# Patient Record
Sex: FEMALE | Race: BLACK OR AFRICAN AMERICAN | Employment: UNEMPLOYED | URBAN - METROPOLITAN AREA
[De-identification: names, ages, dates, MRNs, and addresses within clinical notes are randomized per-mention and may not be internally consistent; named-entity substitution may affect disease eponyms.]

---

## 2017-03-09 ENCOUNTER — OFFICE VISIT (OUTPATIENT)
Dept: CARDIOLOGY CLINIC | Age: 52
End: 2017-03-09

## 2017-03-09 VITALS
HEART RATE: 86 BPM | HEIGHT: 60 IN | OXYGEN SATURATION: 98 % | SYSTOLIC BLOOD PRESSURE: 139 MMHG | WEIGHT: 241 LBS | BODY MASS INDEX: 47.32 KG/M2 | DIASTOLIC BLOOD PRESSURE: 87 MMHG

## 2017-03-09 DIAGNOSIS — I10 ESSENTIAL HYPERTENSION: ICD-10-CM

## 2017-03-09 DIAGNOSIS — G47.33 OSA ON CPAP: ICD-10-CM

## 2017-03-09 DIAGNOSIS — R06.02 SOB (SHORTNESS OF BREATH): Primary | ICD-10-CM

## 2017-03-09 DIAGNOSIS — F17.200 SMOKER: ICD-10-CM

## 2017-03-09 DIAGNOSIS — Z99.89 OSA ON CPAP: ICD-10-CM

## 2017-03-09 NOTE — PATIENT INSTRUCTIONS
-- Make an appointment with Dr. Payal Mijares to discuss smoking cessation  -- Watch the salt in your diet  -- You are doing well; we aren't going to change your medications at this time  -- Encourage you to watch your diet - eat fresh foods over pre-made foods - this will help with blood pressure, water gain and weight. -- Loosing the weight will help with your shortness of breath as well. -- Call anytime you have a question, otherwise we will see you in 1 year. Stopping Smoking: Care Instructions  Your Care Instructions  Cigarette smokers crave the nicotine in cigarettes. Giving it up is much harder than simply changing a habit. Your body has to stop craving the nicotine. It is hard to quit, but you can do it. There are many tools that people use to quit smoking. You may find that combining tools works best for you. There are several steps to quitting. First you get ready to quit. Then you get support to help you. After that, you learn new skills and behaviors to become a nonsmoker. For many people, a necessary step is getting and using medicine. Your doctor will help you set up the plan that best meets your needs. You may want to attend a smoking cessation program to help you quit smoking. When you choose a program, look for one that has proven success. Ask your doctor for ideas. You will greatly increase your chances of success if you take medicine as well as get counseling or join a cessation program.  Some of the changes you feel when you first quit tobacco are uncomfortable. Your body will miss the nicotine at first, and you may feel short-tempered and grumpy. You may have trouble sleeping or concentrating. Medicine can help you deal with these symptoms. You may struggle with changing your smoking habits and rituals. The last step is the tricky one: Be prepared for the smoking urge to continue for a time. This is a lot to deal with, but keep at it. You will feel better.   Follow-up care is a key part of your treatment and safety. Be sure to make and go to all appointments, and call your doctor if you are having problems. Its also a good idea to know your test results and keep a list of the medicines you take. How can you care for yourself at home? · Ask your family, friends, and coworkers for support. You have a better chance of quitting if you have help and support. · Join a support group, such as Nicotine Anonymous, for people who are trying to quit smoking. · Consider signing up for a smoking cessation program, such as the American Lung Association's Freedom from Smoking program.  · Set a quit date. Pick your date carefully so that it is not right in the middle of a big deadline or stressful time. Once you quit, do not even take a puff. Get rid of all ashtrays and lighters after your last cigarette. Clean your house and your clothes so that they do not smell of smoke. · Learn how to be a nonsmoker. Think about ways you can avoid those things that make you reach for a cigarette. ¨ Avoid situations that put you at greatest risk for smoking. For some people, it is hard to have a drink with friends without smoking. For others, they might skip a coffee break with coworkers who smoke. ¨ Change your daily routine. Take a different route to work or eat a meal in a different place. · Cut down on stress. Calm yourself or release tension by doing an activity you enjoy, such as reading a book, taking a hot bath, or gardening. · Talk to your doctor or pharmacist about nicotine replacement therapy, which replaces the nicotine in your body. You still get nicotine but you do not use tobacco. Nicotine replacement products help you slowly reduce the amount of nicotine you need.  These products come in several forms, many of them available over-the-counter:  ¨ Nicotine patches  ¨ Nicotine gum and lozenges  ¨ Nicotine inhaler  · Ask your doctor about bupropion (Wellbutrin) or varenicline (Chantix), which are prescription medicines. They do not contain nicotine. They help you by reducing withdrawal symptoms, such as stress and anxiety. · Some people find hypnosis, acupuncture, and massage helpful for ending the smoking habit. · Eat a healthy diet and get regular exercise. Having healthy habits will help your body move past its craving for nicotine. · Be prepared to keep trying. Most people are not successful the first few times they try to quit. Do not get mad at yourself if you smoke again. Make a list of things you learned and think about when you want to try again, such as next week, next month, or next year. Where can you learn more? Go to http://tin-viv.info/. Enter S382 in the search box to learn more about \"Stopping Smoking: Care Instructions. \"  Current as of: May 26, 2016  Content Version: 11.1  © 4568-9510 [employer]. Care instructions adapted under license by [employer] (which disclaims liability or warranty for this information). If you have questions about a medical condition or this instruction, always ask your healthcare professional. [address] any warranty or liability for your use of this information. DASH Diet: Care Instructions  Your Care Instructions  The DASH diet is an eating plan that can help lower your blood pressure. DASH stands for Dietary Approaches to Stop Hypertension. Hypertension is high blood pressure. The DASH diet focuses on eating foods that are high in calcium, potassium, and magnesium. These nutrients can lower blood pressure. The foods that are highest in these nutrients are fruits, vegetables, low-fat dairy products, nuts, seeds, and legumes. But taking calcium, potassium, and magnesium supplements instead of eating foods that are high in those nutrients does not have the same effect. The DASH diet also includes whole grains, fish, and poultry.   The DASH diet is one of several lifestyle changes your doctor may recommend to lower your high blood pressure. Your doctor may also want you to decrease the amount of sodium in your diet. Lowering sodium while following the DASH diet can lower blood pressure even further than just the DASH diet alone. Follow-up care is a key part of your treatment and safety. Be sure to make and go to all appointments, and call your doctor if you are having problems. It's also a good idea to know your test results and keep a list of the medicines you take. How can you care for yourself at home? Following the DASH diet  · Eat 4 to 5 servings of fruit each day. A serving is 1 medium-sized piece of fruit, ½ cup chopped or canned fruit, 1/4 cup dried fruit, or 4 ounces (½ cup) of fruit juice. Choose fruit more often than fruit juice. · Eat 4 to 5 servings of vegetables each day. A serving is 1 cup of lettuce or raw leafy vegetables, ½ cup of chopped or cooked vegetables, or 4 ounces (½ cup) of vegetable juice. Choose vegetables more often than vegetable juice. · Get 2 to 3 servings of low-fat and fat-free dairy each day. A serving is 8 ounces of milk, 1 cup of yogurt, or 1 ½ ounces of cheese. · Eat 6 to 8 servings of grains each day. A serving is 1 slice of bread, 1 ounce of dry cereal, or ½ cup of cooked rice, pasta, or cooked cereal. Try to choose whole-grain products as much as possible. · Limit lean meat, poultry, and fish to 2 servings each day. A serving is 3 ounces, about the size of a deck of cards. · Eat 4 to 5 servings of nuts, seeds, and legumes (cooked dried beans, lentils, and split peas) each week. A serving is 1/3 cup of nuts, 2 tablespoons of seeds, or ½ cup of cooked beans or peas. · Limit fats and oils to 2 to 3 servings each day. A serving is 1 teaspoon of vegetable oil or 2 tablespoons of salad dressing. · Limit sweets and added sugars to 5 servings or less a week. A serving is 1 tablespoon jelly or jam, ½ cup sorbet, or 1 cup of lemonade.   · Eat less than 2,300 milligrams (mg) of sodium a day. If you limit your sodium to 1,500 mg a day, you can lower your blood pressure even more. Tips for success  · Start small. Do not try to make dramatic changes to your diet all at once. You might feel that you are missing out on your favorite foods and then be more likely to not follow the plan. Make small changes, and stick with them. Once those changes become habit, add a few more changes. · Try some of the following:  ¨ Make it a goal to eat a fruit or vegetable at every meal and at snacks. This will make it easy to get the recommended amount of fruits and vegetables each day. ¨ Try yogurt topped with fruit and nuts for a snack or healthy dessert. ¨ Add lettuce, tomato, cucumber, and onion to sandwiches. ¨ Combine a ready-made pizza crust with low-fat mozzarella cheese and lots of vegetable toppings. Try using tomatoes, squash, spinach, broccoli, carrots, cauliflower, and onions. ¨ Have a variety of cut-up vegetables with a low-fat dip as an appetizer instead of chips and dip. ¨ Sprinkle sunflower seeds or chopped almonds over salads. Or try adding chopped walnuts or almonds to cooked vegetables. ¨ Try some vegetarian meals using beans and peas. Add garbanzo or kidney beans to salads. Make burritos and tacos with mashed kuo beans or black beans. Where can you learn more? Go to http://tin-viv.info/. Enter M478 in the search box to learn more about \"DASH Diet: Care Instructions. \"  Current as of: March 23, 2016  Content Version: 11.1  © 6888-2790 [email or web address]. Care instructions adapted under license by [employer] (which disclaims liability or warranty for this information). If you have questions about a medical condition or this instruction, always ask your healthcare professional. [address] any warranty or liability for your use of this information.

## 2017-03-09 NOTE — PROGRESS NOTES
[facility]   [address] [facility], [address] [place] [address]                                          NEW PATIENT HPI/FOLLOW-UP      NAME:  Del Damico   :   1965   MRN:   [id number]   PCP:  Christina Murray MD           Subjective: The patient is a 46y.o. year old female who returns for a routine annual follow-up. Since the last visit, patient reports worsening BARNES without chest pain or lightheadedness or syncope. She has  had a weight increase. She continues to smoke 1/2 ppd. Endorsed change in exercise tolerance: cannot walk 1/2 block or 1/2 flight of stairs without severe dyspnea. She is compliant on CPAP. Denies chest pain, edema, medication intolerance, palpitations, PND/orthopnea, wheezing, sputum, syncope, dizziness or light headedness. Doing satisfactorily. Review of Systems  General: +weight gain. Pt is able to conduct ADL's. Respiratory: +shortness of breath, BARNES, Denies wheezing or stridor.   Cardiovascular: Denies precordial pain, palpitations, edema or PND  Gastrointestinal: Denies poor appetite, indigestion, abdominal pain or blood in stool  Peripheral vascular: Denies claudication, leg cramps  Neuropsychiatric: Denies paresthesias,tingling,numbness,anxiety,depression,fatigue  Musculoskeletal: Denies pain,tenderness, soreness,swelling      Past Medical History:   Diagnosis Date    Chronic pelvic pain in female 2014    Depression     Diabetes ([facility] [address]     Hypertension     Obesity (BMI 35.0-39.9 without comorbidity) ([facility] [address]     SHAGUFTA on CPAP 3/9/2017    Psychiatric disorder     depression    Recurrent major depression ([address] 2010    Suicidal thoughts     Thyroid nodule 10/1/2015    Unspecified sleep apnea     uses cpap     Patient Active Problem List    Diagnosis Date Noted    SHAGUFTA on CPAP 2017    Essential hypertension 2017    Smoker 2017    Thyroid nodule 10/01/2015    Granulation tissue of vaginal cuff 2014    H/O:  2014    H/O tubal ligation 2014    Posttraumatic stress disorder 08/10/2012    Obesity (BMI 35.0-39.9 without comorbidity) [facility] [address]     Recurrent major depression ([facility] [address] 2010      Past Surgical History:   Procedure Laterality Date    ABDOMEN SURGERY PROC UNLISTED      cholecystectomy    HX  SECTION      HX  SECTION      HX HEENT      head and neck surgery     HX HYSTERECTOMY      HX ORTHOPAEDIC      L ankle surgery     HX TUBAL LIGATION      PPBTL     Allergies   Allergen Reactions    Zantac [Ranitidine Hcl] Hives      Family History   Problem Relation Age of Onset   [facility] Cancer Mother 61     Breast cancer    Heart Disease Father 50     M. I.   [facility] Asthma Sister     Heart Disease Brother     Hypertension Brother     Hypertension Brother     Diabetes Brother     Lung Disease Brother      COPD      Social History     Social History    Marital status: SINGLE     Spouse name: N/A    Number of children: N/A    Years of education: N/A     Occupational History    Not on file. Social History Main Topics    Smoking status: Current Every Day Smoker     Packs/day: 1.00     Years: 20.00    Smokeless tobacco: Never Used    Alcohol use No    Drug use: No    Sexual activity: Yes     Partners: Male     Birth control/ protection: Surgical     Other Topics Concern    Not on file     Social History Narrative      Current Outpatient Prescriptions   Medication Sig    LIRAGLUTIDE (VICTOZA 2-MARILIN SC) by SubCUTAneous route. 1.8 MG QD    metoprolol succinate (TOPROL-XL) 25 mg XL tablet Take 1 Tab by mouth daily.     lisinopril (PRINIVIL, ZESTRIL) 20 mg tablet     HYDROcodone-acetaminophen (NORCO) 7.5-325 mg per tablet     atorvastatin (LIPITOR) 20 mg tablet  20 mg = 1 tab each dose, PO, bedtime, 0 Refills    metFORMIN ER (GLUCOPHAGE XR) 500 mg tablet     CONTOUR NEXT STRIPS strip     alcohol swabs padm     omeprazole (PRILOSEC) 40 mg capsule Take 1 capsule by mouth two (2) times a day.  albuterol (PROVENTIL HFA, VENTOLIN HFA) 90 mcg/actuation inhaler Take 2 Puffs by inhalation as needed.  ANSHUL PEN NEEDLE 32 x 5/32 \" ndle daily.  insulin glargine (LANTUS) 100 unit/mL injection 30 Units by SubCUTAneous route nightly. Indications: HYPERGLYCEMIA, TYPE 2 DIABETES MELLITUS    ARIPiprazole (ABILIFY) 10 mg tablet Take 10 mg by mouth daily.  citalopram (CELEXA) 40 mg tablet Take 40 mg by mouth daily. No current facility-administered medications for this visit. I have reviewed the MAs notes, vitals, problem list, allergy list, medical history, family medical, social history and medications. Objective:     Physical Exam:     Vitals:    03/09/17 1103   BP: 139/87   Pulse: 86   SpO2: 98%   Weight: 241 lb (109.3 kg)   Height: 5' (1.524 m)    Body mass index is 47.07 kg/(m^2). General: WDWN, in no acute distress. Obese. Pleasant affect. HEENT: No carotid bruits, no JVD, trach is midline. Heart:  Normal S1/S2 negative S3 or S4. Regular, no murmur, gallop or rub.   Respiratory: +rhonchus BS throughout  Abdomen:   Soft, non-tender, bowel sounds are active.   Extremities:  No edema, normal cap refill, no cyanosis. Neuro: A&Ox3, speech clear, gait stable. Skin: Skin color is normal. No rashes or lesions. No diaphoresis.   Vascular: 2+ pulses symmetric in all extremities        Data Review:       Cardiographics:    EKG: NSR    Cardiology Labs:    Results for orders placed or performed during the hospital encounter of 12/09/15   ECG HOLTER MONITOR, [facility] [address]  [address]                                                Test Date:    2015-12-09  Edihelleti Page Name:     Doris Rajendra       Department:     Patient ID:   [id number]                Room:           Gender: Technician:   GERALD  :          [id number]               Requested By:  Chante Villanueva MD  Order Number:                          Reading MD:   Chante Villanueva MD                             Interpretive Statements  Thomas Diego was in [place] [address]. The average heart rate, excluding ectopy, was 89 BPM with a minimum of 56 BPM  at  04:10D3 and a maximum of 123 BPM at   16:31D2. Heart beats, including ectopy, totaled [id number] beats. There were no VENTRICULAR ectopics found. SUPRAVENTRICULAR ECTOPICS totaled 3  ,with 3 single and 0 paired beats. 1. Rhythm is sinus. 2. IL and QRS are within normal limits. 3. (3) Single sve''s. This study has been read and dictated by Dr Elvin Contreras.     Chante Villanueva MD [facility]  Electronically signed on 12-28-15 10:02:44 CST by Chante Villanueva MD   Results for orders placed or performed during the hospital encounter of 08/11/15   EKG, 12 LEAD, INITIAL   Result Value Ref Range    Ventricular Rate 75 BPM    Atrial Rate 75 BPM    P-R Interval 152 ms    QRS Duration 82 ms    Q-T Interval 396 ms    QTC Calculation (Bezet) 442 ms    Calculated P Axis 35 degrees    Calculated R Axis 49 degrees    Calculated T Axis 53 degrees    Diagnosis       Normal sinus rhythm  Normal ECG  Confirmed by Magdy Lama ([id number]) on 2015 7:11:41 PM         Lab Results   Component Value Date/Time    Cholesterol, total 127 2013 05:53 AM    HDL Cholesterol 37 2013 05:53 AM    LDL, calculated 68.8 2013 05:53 AM    Triglyceride 106 2013 05:53 AM    CHOL/HDL Ratio 3.4 2013 05:53 AM       Lab Results   Component Value Date/Time    Sodium 139 2015 12:00 AM    Potassium 3.8 2015 12:00 AM    Chloride 97 2015 12:00 AM    CO2 21 2015 12:00 AM    Anion gap 10 2015 10:45 AM    Glucose 100 2015 12:00 AM    Glucose 117 2013 05:53 AM    BUN 16 2015 12:00 AM    Creatinine 1.02 2015 12:00 AM BUN/Creatinine ratio 16 12/07/2015 12:00 AM    GFR est AA 74 12/07/2015 12:00 AM    GFR est non-AA 64 12/07/2015 12:00 AM    Calcium 9.7 12/07/2015 12:00 AM    Bilirubin, total 0.4 08/11/2015 10:45 AM    AST (SGOT) 26 08/11/2015 10:45 AM    Alk. phosphatase 77 08/11/2015 10:45 AM    Protein, total 7.9 08/11/2015 10:45 AM    Albumin 3.7 08/11/2015 10:45 AM    Globulin 4.2 08/11/2015 10:45 AM    A-G Ratio 0.9 08/11/2015 10:45 AM    ALT (SGPT) 41 08/11/2015 10:45 AM          Assessment:       ICD-10-CM ICD-9-CM    1. SOB (shortness of breath) R06.02 786.05 AMB POC EKG ROUTINE W/ 12 LEADS, INTER & REP   2. Smoker F17.200 305.1    3. Essential hypertension I10 401.9    4. SHAGUFTA on CPAP G47.33 327.23          Discussion: Patient presents at this time stable from a cardiac perspective. Pt has gained 11 lbs since last year visit. She reports corresponding BARNES. EF 55-60% on 2016 Echo. HTN well managed but has been increasing. Pt advised to seriously consider lifestyle changes and we discussed mainly: smoking cessation and DASH diet. The patient was counseled on the dangers of tobacco use, and was advised to quit. Reviewed strategies to maximize success, including removing cigarettes and smoking materials from environment, written materials and discuss pharmcologic assistance with PCP if desired. .          Plan: 1. Continue same meds. Lipid profile and labs followed by PCP. -- ADVISED SMOKING CESSATION; literature given and advise f/u with Dr. Leydi Anderson   -- ADVISED DASH Diet and went over what this means    2. Encouraged to exercise to tolerance, lose weight, stop smoking and follow low fat, low cholesterol, low sodium predominantly Plant-based (consider Mediterranean) diet. Call with questions or concerns. Will follow up any test results by phone and/or f/u here in office if needed. Lindy Deleon       3.Follow up: 1 year or as needed to support lifestyle changes above    I have discussed the diagnosis with the patient and the intended plan as seen in the above orders. The patient has received an after-visit summary and questions were answered concerning future plans. I have discussed any concerning medication side effects and warnings with the patient as well.     Manny Cruz PA-C  3/9/2017

## 2017-03-09 NOTE — MR AVS SNAPSHOT
Visit Information Date & Time Provider Department Dept. Phone Encounter #  
 3/9/2017 10:30 AM Josué Mina MD [facility] - PSYCHIATRIC SUPPORT [place] [id number] Your Appointments 4/11/2017 10:00 AM  
Any with Kaylan Sofia MD  
[id number] [facility] ([address]) Appt Note: yrly cpap follow up [place] [address] [facility] [address]  
  
   
 [address] [facility] [address] [id number] Upcoming Health Maintenance Date Due Hepatitis C Screening 1965 FOOT EXAM Q1 11/25/1975 MICROALBUMIN Q1 11/25/1975 EYE EXAM RETINAL OR DILATED Q1 11/25/1975 Pneumococcal 19-64 Medium Risk (1 of 1 - PPSV23) 11/25/1984 DTaP/Tdap/Td series (1 - Tdap) 11/25/1986 HEMOGLOBIN A1C Q6M 8/22/2013 LIPID PANEL Q1 2/18/2014 FOBT Q 1 YEAR AGE 50-75 11/25/2015 BREAST CANCER SCRN MAMMOGRAM 6/11/2016 INFLUENZA AGE 9 TO ADULT 8/1/2016 PAP AKA CERVICAL CYTOLOGY 6/5/2017 Allergies as of 3/9/2017  Review Complete On: 3/9/2017 By: Jennifer Sorensen PA-C Severity Noted Reaction Type Reactions Zantac [Ranitidine Hcl] Medium 07/27/2010   Side Effect Hives Current Immunizations  Reviewed on 2/17/2013 No immunizations on file. Not reviewed this visit You Were Diagnosed With   
  
 Codes Comments SOB (shortness of breath)    -  Primary ICD-10-CM: R06.02 
ICD-9-CM: 786.05 Smoker     ICD-10-CM: P47.654 ICD-9-CM: 305.1 Essential hypertension     ICD-10-CM: I10 
ICD-9-CM: 401.9 SHAGUFTA on CPAP     ICD-10-CM: G47.33 
ICD-9-CM: 327.23 Vitals BP Pulse Height(growth percentile) Weight(growth percentile) SpO2 BMI  
 139/87 86 5' (1.524 m) 241 lb (109.3 kg) 98% 47.07 kg/m2 OB Status Smoking Status Having regular periods Current Every Day Smoker Vitals History BMI and BSA Data Body Mass Index Body Surface Area 47.07 kg/m 2 2.15 m 2 Preferred Pharmacy Pharmacy Name Phone [facility] [address] [place] [address] [place] [phone] Your Updated Medication List  
  
   
This list is accurate as of: 3/9/17 12:03 PM.  Always use your most recent med list.  
  
  
  
  
 ABILIFY 10 mg tablet Generic drug:  ARIPiprazole Take 10 mg by mouth daily. albuterol 90 mcg/actuation inhaler Commonly known as:  PROVENTIL HFA, VENTOLIN HFA, PROAIR HFA Take 2 Puffs by inhalation as needed. alcohol swabs Padm  
  
 atorvastatin 20 mg tablet Commonly known as:  LIPITOR 20 mg = 1 tab each dose, PO, bedtime, 0 Refills CeleXA 40 mg tablet Generic drug:  citalopram  
Take 40 mg by mouth daily. CONTOUR NEXT STRIPS strip Generic drug:  glucose blood VI test strips HYDROcodone-acetaminophen 7.5-325 mg per tablet Commonly known as:  NORCO  
  
 LANTUS 100 unit/mL injection Generic drug:  insulin glargine 30 Units by SubCUTAneous route nightly. Indications: HYPERGLYCEMIA, TYPE 2 DIABETES MELLITUS  
  
 lisinopril 20 mg tablet Commonly known as:  PRINIVIL, ZESTRIL  
  
 metFORMIN  mg tablet Commonly known as:  GLUCOPHAGE XR  
  
 metoprolol succinate 25 mg XL tablet Commonly known as:  TOPROL-XL Take 1 Tab by mouth daily. Svetlana Pen Needle 32 gauge x 5/32\" Ndle Generic drug:  Insulin Needles (Disposable) daily. omeprazole 40 mg capsule Commonly known as:  PRILOSEC Take 1 capsule by mouth two (2) times a day. VICTOZA 2-MARILIN SC  
by SubCUTAneous route. 1.8 MG QD We Performed the Following AMB POC EKG ROUTINE W/ 12 LEADS, INTER & REP [25165 CPT(R)] Patient Instructions -- Make an appointment with Dr. Connie Bergman to discuss smoking cessation 
-- Watch the salt in your diet 
-- You are doing well; we aren't going to change your medications at this time -- Encourage you to watch your diet - eat fresh foods over pre-made foods - this will help with blood pressure, water gain and weight. -- Loosing the weight will help with your shortness of breath as well. -- Call anytime you have a question, otherwise we will see you in 1 year. Stopping Smoking: Care Instructions Your Care Instructions Cigarette smokers crave the nicotine in cigarettes. Giving it up is much harder than simply changing a habit. Your body has to stop craving the nicotine. It is hard to quit, but you can do it. There are many tools that people use to quit smoking. You may find that combining tools works best for you. There are several steps to quitting. First you get ready to quit. Then you get support to help you. After that, you learn new skills and behaviors to become a nonsmoker. For many people, a necessary step is getting and using medicine. Your doctor will help you set up the plan that best meets your needs. You may want to attend a smoking cessation program to help you quit smoking. When you choose a program, look for one that has proven success. Ask your doctor for ideas. You will greatly increase your chances of success if you take medicine as well as get counseling or join a cessation program. 
Some of the changes you feel when you first quit tobacco are uncomfortable. Your body will miss the nicotine at first, and you may feel short-tempered and grumpy. You may have trouble sleeping or concentrating. Medicine can help you deal with these symptoms. You may struggle with changing your smoking habits and rituals. The last step is the tricky one: Be prepared for the smoking urge to continue for a time. This is a lot to deal with, but keep at it. You will feel better. Follow-up care is a key part of your treatment and safety. Be sure to make and go to all appointments, and call your doctor if you are having problems.  Its also a good idea to know your test results and keep a list of the medicines you take. How can you care for yourself at home? · Ask your family, friends, and coworkers for support. You have a better chance of quitting if you have help and support. · Join a support group, such as Nicotine Anonymous, for people who are trying to quit smoking. · Consider signing up for a smoking cessation program, such as the American Lung Association's Freedom from Smoking program. 
· Set a quit date. Pick your date carefully so that it is not right in the middle of a big deadline or stressful time. Once you quit, do not even take a puff. Get rid of all ashtrays and lighters after your last cigarette. Clean your house and your clothes so that they do not smell of smoke. · Learn how to be a nonsmoker. Think about ways you can avoid those things that make you reach for a cigarette. ¨ Avoid situations that put you at greatest risk for smoking. For some people, it is hard to have a drink with friends without smoking. For others, they might skip a coffee break with coworkers who smoke. ¨ Change your daily routine. Take a different route to work or eat a meal in a different place. · Cut down on stress. Calm yourself or release tension by doing an activity you enjoy, such as reading a book, taking a hot bath, or gardening. · Talk to your doctor or pharmacist about nicotine replacement therapy, which replaces the nicotine in your body. You still get nicotine but you do not use tobacco. Nicotine replacement products help you slowly reduce the amount of nicotine you need. These products come in several forms, many of them available over-the-counter: ¨ Nicotine patches ¨ Nicotine gum and lozenges ¨ Nicotine inhaler · Ask your doctor about bupropion (Wellbutrin) or varenicline (Chantix), which are prescription medicines. They do not contain nicotine. They help you by reducing withdrawal symptoms, such as stress and anxiety. · Some people find hypnosis, acupuncture, and massage helpful for ending the smoking habit. · Eat a healthy diet and get regular exercise. Having healthy habits will help your body move past its craving for nicotine. · Be prepared to keep trying. Most people are not successful the first few times they try to quit. Do not get mad at yourself if you smoke again. Make a list of things you learned and think about when you want to try again, such as next week, next month, or next year. Where can you learn more? Go to http://tin-viv.info/. Enter M542 in the search box to learn more about \"Stopping Smoking: Care Instructions. \" Current as of: May 26, 2016 Content Version: 11.1 © 9104-9076 [employer]. Care instructions adapted under license by [employer] (which disclaims liability or warranty for this information). If you have questions about a medical condition or this instruction, always ask your healthcare professional. [place] [address] any warranty or liability for your use of this information. DASH Diet: Care Instructions Your Care Instructions The DASH diet is an eating plan that can help lower your blood pressure. DASH stands for Dietary Approaches to Stop Hypertension. Hypertension is high blood pressure. The DASH diet focuses on eating foods that are high in calcium, potassium, and magnesium. These nutrients can lower blood pressure. The foods that are highest in these nutrients are fruits, vegetables, low-fat dairy products, nuts, seeds, and legumes. But taking calcium, potassium, and magnesium supplements instead of eating foods that are high in those nutrients does not have the same effect. The DASH diet also includes whole grains, fish, and poultry. The DASH diet is one of several lifestyle changes your doctor may recommend to lower your high blood pressure.  Your doctor may also want you to decrease the amount of sodium in your diet. Lowering sodium while following the DASH diet can lower blood pressure even further than just the DASH diet alone. Follow-up care is a key part of your treatment and safety. Be sure to make and go to all appointments, and call your doctor if you are having problems. It's also a good idea to know your test results and keep a list of the medicines you take. How can you care for yourself at home? Following the DASH diet · Eat 4 to 5 servings of fruit each day. A serving is 1 medium-sized piece of fruit, ½ cup chopped or canned fruit, 1/4 cup dried fruit, or 4 ounces (½ cup) of fruit juice. Choose fruit more often than fruit juice. · Eat 4 to 5 servings of vegetables each day. A serving is 1 cup of lettuce or raw leafy vegetables, ½ cup of chopped or cooked vegetables, or 4 ounces (½ cup) of vegetable juice. Choose vegetables more often than vegetable juice. · Get 2 to 3 servings of low-fat and fat-free dairy each day. A serving is 8 ounces of milk, 1 cup of yogurt, or 1 ½ ounces of cheese. · Eat 6 to 8 servings of grains each day. A serving is 1 slice of bread, 1 ounce of dry cereal, or ½ cup of cooked rice, pasta, or cooked cereal. Try to choose whole-grain products as much as possible. · Limit lean meat, poultry, and fish to 2 servings each day. A serving is 3 ounces, about the size of a deck of cards. · Eat 4 to 5 servings of nuts, seeds, and legumes (cooked dried beans, lentils, and split peas) each week. A serving is 1/3 cup of nuts, 2 tablespoons of seeds, or ½ cup of cooked beans or peas. · Limit fats and oils to 2 to 3 servings each day. A serving is 1 teaspoon of vegetable oil or 2 tablespoons of salad dressing. · Limit sweets and added sugars to 5 servings or less a week. A serving is 1 tablespoon jelly or jam, ½ cup sorbet, or 1 cup of lemonade. · Eat less than 2,300 milligrams (mg) of sodium a day.  If you limit your sodium to 1,500 mg a day, you can lower your blood pressure even more. Tips for success · Start small. Do not try to make dramatic changes to your diet all at once. You might feel that you are missing out on your favorite foods and then be more likely to not follow the plan. Make small changes, and stick with them. Once those changes become habit, add a few more changes. · Try some of the following: ¨ Make it a goal to eat a fruit or vegetable at every meal and at snacks. This will make it easy to get the recommended amount of fruits and vegetables each day. ¨ Try yogurt topped with fruit and nuts for a snack or healthy dessert. ¨ Add lettuce, tomato, cucumber, and onion to sandwiches. ¨ Combine a ready-made pizza crust with low-fat mozzarella cheese and lots of vegetable toppings. Try using tomatoes, squash, spinach, broccoli, carrots, cauliflower, and onions. ¨ Have a variety of cut-up vegetables with a low-fat dip as an appetizer instead of chips and dip. ¨ Sprinkle sunflower seeds or chopped almonds over salads. Or try adding chopped walnuts or almonds to cooked vegetables. ¨ Try some vegetarian meals using beans and peas. Add garbanzo or kidney beans to salads. Make burritos and tacos with mashed kuo beans or black beans. Where can you learn more? Go to http://tin-viv.info/. Enter D510 in the search box to learn more about \"DASH Diet: Care Instructions. \" Current as of: March 23, 2016 Content Version: 11.1 © 2923-7023 [employer]. Care instructions adapted under license by [employer] (which disclaims liability or warranty for this information). If you have questions about a medical condition or this instruction, always ask your healthcare professional. [address]  any warranty or liability for your use of this information. Introducing [facility]! [facility] introduces Dokogeo patient portal. Now you can access parts of your medical record, email your doctor's office, and request medication refills online. 1. In your internet browser, go to [email or web address] 2. Click on the First Time User? Click Here link in the Sign In box. You will see the New Member Sign Up page. 3. Enter your Dokogeo Access Code exactly as it appears below. You will not need to use this code after youve completed the sign-up process. If you do not sign up before the expiration date, you must request a new code. · Dokogeo Access Code: [email or web address] Expires: 6/7/2017 12:03 PM 
 
4. Enter the last four digits of your Social Security Number (xxxx) and Date of Birth (mm/dd/yyyy) as indicated and click Submit. You will be taken to the next sign-up page. 5. Create a Dokogeo ID. This will be your Dokogeo login ID and cannot be changed, so think of one that is secure and easy to remember. 6. Create a Dokogeo password. You can change your password at any time. 7. Enter your Password Reset Question and Answer. This can be used at a later time if you forget your password. 8. Enter your e-mail address. You will receive e-mail notification when new information is available in [address] 9. Click Sign Up. You can now view and download portions of your medical record. 10. Click the Download Summary menu link to download a portable copy of your medical information. If you have questions, please visit the Frequently Asked Questions section of the Dokogeo website. Remember, Dokogeo is NOT to be used for urgent needs. For medical emergencies, dial 911. Now available from your iPhone and Android! Please provide this summary of care documentation to your next provider. Your primary care clinician is listed as Servando Riley. If you have any questions after today's visit, please call [phone].

## 2017-05-29 ENCOUNTER — HOSPITAL ENCOUNTER (EMERGENCY)
Age: 52
Discharge: HOME | End: 2017-05-29
Attending: EMERGENCY MEDICINE
Payer: MEDICARE

## 2017-05-29 VITALS
RESPIRATION RATE: 16 BRPM | OXYGEN SATURATION: 98 % | HEART RATE: 75 BPM | BODY MASS INDEX: 45.45 KG/M2 | SYSTOLIC BLOOD PRESSURE: 162 MMHG | TEMPERATURE: 97.8 F | HEIGHT: 60 IN | DIASTOLIC BLOOD PRESSURE: 98 MMHG | WEIGHT: 231.48 LBS

## 2017-05-29 DIAGNOSIS — L50.9 HIVE: Primary | ICD-10-CM

## 2017-05-29 PROCEDURE — 99283 EMERGENCY DEPT VISIT LOW MDM: CPT

## 2017-05-29 PROCEDURE — 74011636637 HC RX REV CODE- 636/637: Performed by: EMERGENCY MEDICINE

## 2017-05-29 PROCEDURE — 74011250637 HC RX REV CODE- 250/637: Performed by: EMERGENCY MEDICINE

## 2017-05-29 PROCEDURE — A9270 NON-COVERED ITEM OR SERVICE: HCPCS | Performed by: EMERGENCY MEDICINE

## 2017-05-29 RX ORDER — DIPHENHYDRAMINE HCL 50 MG
50 CAPSULE ORAL
Status: COMPLETED | OUTPATIENT
Start: 2017-05-29 | End: 2017-05-29

## 2017-05-29 RX ORDER — PREDNISONE 20 MG/1
60 TABLET ORAL
Status: COMPLETED | OUTPATIENT
Start: 2017-05-29 | End: 2017-05-29

## 2017-05-29 RX ADMIN — PREDNISONE 60 MG: 20 TABLET ORAL at 07:41

## 2017-05-29 RX ADMIN — DIPHENHYDRAMINE HYDROCHLORIDE 50 MG: 50 CAPSULE ORAL at 07:41

## 2017-05-29 NOTE — DISCHARGE INSTRUCTIONS
Hives: Care Instructions  Your Care Instructions  Hives are raised, red, itchy patches of skin. They are also called wheals or welts. They usually have red borders and pale centers. Hives range in size from ¼ inch to 3 inches or more across. They may seem to move from place to place on the skin. Several hives may form a large area of raised, red skin. You can get hives after an insect sting, after taking medicine or eating certain foods, or because of infection or stress. Other causes include plants, things you breathe in, makeup, heat, cold, sunlight, and latex. You cannot spread hives to other people. Hives may last a few minutes or a few days, but a single spot may last less than 36 hours. Follow-up care is a key part of your treatment and safety. Be sure to make and go to all appointments, and call your doctor if you are having problems. It's also a good idea to know your test results and keep a list of the medicines you take. How can you care for yourself at home? · Avoid whatever you think may have caused your hives, such as a certain food or medicine. However, you may not know the cause. · Put a cool, wet towel on the area to relieve itching. · Take an over-the-counter antihistamine, such as diphenhydramine (Benadryl), cetirizine (Zyrtec), or loratadine (Claritin), to help stop the hives and calm the itching. Read and follow directions on the label. These medicines can make you feel sleepy. Do not drive while using them. · Stay away from strong soaps, detergents, and chemicals. These can make itching worse. When should you call for help? Call 911 anytime you think you may need emergency care. For example, call if:  · You have symptoms of a severe allergic reaction. These may include:  ¨ Sudden raised, red areas (hives) all over your body. ¨ Swelling of the throat, mouth, lips, or tongue. ¨ Trouble breathing. ¨ Passing out (losing consciousness).  Or you may feel very lightheaded or suddenly feel weak, confused, or restless. Call your doctor now or seek immediate medical care if:  · You have symptoms of an allergic reaction, such as:  ¨ A rash or hives (raised, red areas on the skin). ¨ Itching. ¨ Swelling. ¨ Belly pain, nausea, or vomiting. · You get hives after you start a new medicine. · Hives have not gone away after 24 hours. Watch closely for changes in your health, and be sure to contact your doctor if:  · You do not get better as expected. Where can you learn more? Go to http://tin-viv.info/. Enter P756 in the search box to learn more about \"Hives: Care Instructions. \"  Current as of: May 27, 2016  Content Version: 11.2  © 7089-1880 [employer]. Care instructions adapted under license by [employer] (which disclaims liability or warranty for this information). If you have questions about a medical condition or this instruction, always ask your healthcare professional. [address] any warranty or liability for your use of this information.

## 2017-05-29 NOTE — ED NOTES
Dr. Wilbur Becker at bedside completing discharge instructions and education with patient. Patient verbalized understanding. Patient ambulated out of ED without complications and in no acute distress.

## 2017-05-29 NOTE — ED PROVIDER NOTES
HPI Comments: Frank Schaefer is a 46 y.o. female with PMhx significant for HTN, DM, and depression who presents ambulatory to the ED with cc of patch of increased swelling, redness, and pain to the skin of her left bicep upon waking this morning. She denies any contact with new deodorants, lotions, detergents, plants, or allergens that may contribute. Pt endorses applying isopropyl alcohol to the affected area, but denies taking any medications for her current symptoms. Pt specifically denies any wheezing, SOB, difficulty swallowing, and N/V/D.         PCP: America Aguero MD    There are no other complaints, changes or physical findings at this time. The history is provided by the patient. Past Medical History:   Diagnosis Date    Chronic pelvic pain in female 2014    Depression     Diabetes [facility]     Hypertension     Obesity (BMI 35.0-39.9 without comorbidity) ([facility]     SHAGUFTA on CPAP 3/9/2017    Psychiatric disorder     depression    Recurrent major depression ([facility] 2010    Suicidal thoughts     Thyroid nodule 10/1/2015    Unspecified sleep apnea     uses cpap       Past Surgical History:   Procedure Laterality Date    ABDOMEN SURGERY PROC UNLISTED      cholecystectomy    HX  SECTION      HX  SECTION      HX HEENT      head and neck surgery     HX HYSTERECTOMY      HX ORTHOPAEDIC      L ankle surgery     HX TUBAL LIGATION  1998    PPBTL         Family History:   Problem Relation Age of Onset   [facility] Cancer Mother 61     Breast cancer    Heart Disease Father 50     M. I.   [facility] Asthma Sister     Heart Disease Brother     Hypertension Brother     Hypertension Brother     Diabetes Brother     Lung Disease Brother      COPD       Social History     Social History    Marital status: SINGLE     Spouse name: N/A    Number of children: N/A    Years of education: N/A     Occupational History    Not on file.      Social History Main Topics    Smoking status: Current Every Day Smoker     Packs/day: 1.00     Years: 20.00    Smokeless tobacco: Never Used    Alcohol use No    Drug use: No    Sexual activity: Yes     Partners: Male     Birth control/ protection: Surgical     Other Topics Concern    Not on file     Social History Narrative         ALLERGIES: Zantac [ranitidine hcl]    Review of Systems   Constitutional: Negative for chills, diaphoresis, fever and unexpected weight change. HENT: Negative for rhinorrhea, sore throat and trouble swallowing. Eyes: Negative for pain. Respiratory: Negative for shortness of breath, wheezing and stridor. Cardiovascular: Negative for chest pain and leg swelling. Gastrointestinal: Negative for abdominal pain, blood in stool, diarrhea, nausea and vomiting. Genitourinary: Negative for difficulty urinating, dysuria and flank pain. Musculoskeletal: Negative for back pain and neck stiffness. Skin:        + patch of swelling, redness, and pain to L bicep   Neurological: Negative for seizures, syncope, weakness, light-headedness and headaches. Psychiatric/Behavioral: Negative for confusion. Patient Vitals for the past 12 hrs:   Temp Pulse Resp BP SpO2   05/29/17 0804 97.8 °F (36.6 °C) 75 16 (!) 162/98 98 %   05/29/17 0617 98.2 °F (36.8 °C) 85 18 (!) 161/99 100 %            Physical Exam   Constitutional: She is oriented to person, place, and time. She appears well-developed. No distress. Elevated BMI   HENT:   Nose: Nose normal.   Mouth/Throat: Oropharynx is clear and moist. No oropharyngeal exudate. Eyes: Conjunctivae and EOM are normal. Pupils are equal, round, and reactive to light. Right eye exhibits no discharge. Left eye exhibits no discharge. No scleral icterus. Neck: Normal range of motion. Neck supple. No JVD present. Cardiovascular: Normal rate, regular rhythm, normal heart sounds and intact distal pulses. No murmur heard.   Pulmonary/Chest: Effort normal and breath sounds normal. No stridor. No respiratory distress. She has no wheezes. She has no rales. Abdominal: Soft. Bowel sounds are normal. She exhibits no distension. There is no tenderness. There is no rebound and no guarding. Musculoskeletal: She exhibits no edema or tenderness. Neurological: She is alert and oriented to person, place, and time. Skin: Skin is warm and dry. She is not diaphoretic. 6 cm x 10 cm erythematous patch with a peripheral halo   Psychiatric: She has a normal mood and affect. Nursing note and vitals reviewed. MDM  Number of Diagnoses or Management Options  Hive:   Diagnosis management comments: Singular discrete hive of the left upper extremity. Rapidly improved with treatment in ED. No other complaints at this time. Stable for outpatient management. Amount and/or Complexity of Data Reviewed  Review and summarize past medical records: yes    Patient Progress  Patient progress: stable    ED Course       Procedures    PROGRESS NOTE:  9:06 AM  Pt reports she is feeling better after medication. Written by Sheba Mckenna, ED Scribe, as dictated by Lily Pratt MD.    MEDICATIONS GIVEN:  Medications   diphenhydrAMINE (BENADRYL) capsule 50 mg (50 mg Oral Given 5/29/17 0741)   predniSONE (DELTASONE) tablet 60 mg (60 mg Oral Given 5/29/17 0741)       IMPRESSION:  1. Hive        PLAN:  1. Discharge home  2. Follow-up Information     Follow up With Details Comments Contact Info    Alfredo Rojas MD Call As needed [address] [phone]          Return to ED if worse     DISCHARGE NOTE:  9:24 AM  The patient is ready for discharge. The patients signs, symptoms, diagnosis, and instructions for discharge have been discussed and the pt has conveyed their understanding. The patient is to follow up as recommended with PCP or return to the ER should their symptoms worsen. Plan has been discussed and patient has conveyed their agreement.         This note is prepared by Maddie Clark Kiki Gonzalez, acting as Scribe for Torsten Cristina MD.    Torsten Cristina MD: The scribe's documentation has been prepared under my direction and personally reviewed by me in its entirety. I confirm that the note above accurately reflects all work, treatment, procedures, and medical decision making performed by me.

## 2017-05-29 NOTE — ED NOTES
Received bedside verbal report from Song Rodriguez RN. Patient arrived to ED with c/o red circular rash area to upper medial left arm that has increased in size in several days, c/o itching and burning. Patient denies any other skin issues. Assessment completed by Song Rodriguez RN. Provider at bedside now.

## 2017-08-08 ENCOUNTER — HOSPITAL ENCOUNTER (EMERGENCY)
Age: 52
Discharge: HOME | End: 2017-08-09
Attending: EMERGENCY MEDICINE
Payer: MEDICARE

## 2017-08-08 ENCOUNTER — APPOINTMENT (OUTPATIENT)
Dept: GENERAL RADIOLOGY | Age: 52
End: 2017-08-08
Attending: PHYSICIAN ASSISTANT
Payer: MEDICARE

## 2017-08-08 VITALS
OXYGEN SATURATION: 100 % | HEIGHT: 60 IN | DIASTOLIC BLOOD PRESSURE: 83 MMHG | BODY MASS INDEX: 43.67 KG/M2 | HEART RATE: 86 BPM | TEMPERATURE: 98.1 F | RESPIRATION RATE: 14 BRPM | SYSTOLIC BLOOD PRESSURE: 123 MMHG | WEIGHT: 222.44 LBS

## 2017-08-08 DIAGNOSIS — M19.049 ARTHRITIS OF HAND: ICD-10-CM

## 2017-08-08 DIAGNOSIS — M79.89 SWELLING OF RIGHT HAND: Primary | ICD-10-CM

## 2017-08-08 PROCEDURE — 99283 EMERGENCY DEPT VISIT LOW MDM: CPT

## 2017-08-08 PROCEDURE — 73130 X-RAY EXAM OF HAND: CPT

## 2017-08-08 PROCEDURE — 84550 ASSAY OF BLOOD/URIC ACID: CPT | Performed by: PHYSICIAN ASSISTANT

## 2017-08-08 PROCEDURE — 96374 THER/PROPH/DIAG INJ IV PUSH: CPT

## 2017-08-08 PROCEDURE — 80053 COMPREHEN METABOLIC PANEL: CPT | Performed by: PHYSICIAN ASSISTANT

## 2017-08-08 PROCEDURE — 36415 COLL VENOUS BLD VENIPUNCTURE: CPT | Performed by: PHYSICIAN ASSISTANT

## 2017-08-08 PROCEDURE — 74011250636 HC RX REV CODE- 250/636: Performed by: PHYSICIAN ASSISTANT

## 2017-08-08 PROCEDURE — 85025 COMPLETE CBC W/AUTO DIFF WBC: CPT | Performed by: PHYSICIAN ASSISTANT

## 2017-08-08 RX ORDER — KETOROLAC TROMETHAMINE 30 MG/ML
30 INJECTION, SOLUTION INTRAMUSCULAR; INTRAVENOUS
Status: COMPLETED | OUTPATIENT
Start: 2017-08-08 | End: 2017-08-08

## 2017-08-08 RX ADMIN — KETOROLAC TROMETHAMINE 30 MG: 30 INJECTION, SOLUTION INTRAMUSCULAR at 23:49

## 2017-08-08 NOTE — LETTER
[address] 
[facility] EMERGENCY DEPT 
[address] [place] [address] [phone] 
[phone] Work/School Note Date: 8/8/2017 To Whom It May concern: 
 
Hieu Chappell was seen and treated today in the emergency room by the following provider(s): 
Attending Provider: Shayna Crouch MD 
Physician Assistant: Kennis Cogan., PA. Hieu Chappell may return to work on 8/12/17 or sooner, if feeling better. Sincerely, Kennis Cogan., PA

## 2017-08-09 LAB
ALBUMIN SERPL BCP-MCNC: 3.4 G/DL (ref 3.5–5)
ALBUMIN/GLOB SERPL: 0.8 {RATIO} (ref 1.1–2.2)
ALP SERPL-CCNC: 76 U/L (ref 45–117)
ALT SERPL-CCNC: 20 U/L (ref 12–78)
ANION GAP BLD CALC-SCNC: 5 MMOL/L (ref 5–15)
AST SERPL W P-5'-P-CCNC: 14 U/L (ref 15–37)
BASOPHILS # BLD AUTO: 0 K/UL (ref 0–0.1)
BASOPHILS # BLD: 0 % (ref 0–1)
BILIRUB SERPL-MCNC: 0.3 MG/DL (ref 0.2–1)
BUN SERPL-MCNC: 9 MG/DL (ref 6–20)
BUN/CREAT SERPL: 10 (ref 12–20)
CALCIUM SERPL-MCNC: 8.4 MG/DL (ref 8.5–10.1)
CHLORIDE SERPL-SCNC: 109 MMOL/L (ref 97–108)
CO2 SERPL-SCNC: 26 MMOL/L (ref 21–32)
CREAT SERPL-MCNC: 0.89 MG/DL (ref 0.55–1.02)
EOSINOPHIL # BLD: 0.2 K/UL (ref 0–0.4)
EOSINOPHIL NFR BLD: 3 % (ref 0–7)
ERYTHROCYTE [DISTWIDTH] IN BLOOD BY AUTOMATED COUNT: 19 % (ref 11.5–14.5)
GLOBULIN SER CALC-MCNC: 4.1 G/DL (ref 2–4)
GLUCOSE SERPL-MCNC: 84 MG/DL (ref 65–100)
HCT VFR BLD AUTO: 35.6 % (ref 35–47)
HGB BLD-MCNC: 11.6 G/DL (ref 11.5–16)
LYMPHOCYTES # BLD AUTO: 42 % (ref 12–49)
LYMPHOCYTES # BLD: 2.9 K/UL (ref 0.8–3.5)
MCH RBC QN AUTO: 23.7 PG (ref 26–34)
MCHC RBC AUTO-ENTMCNC: 32.6 G/DL (ref 30–36.5)
MCV RBC AUTO: 72.8 FL (ref 80–99)
MONOCYTES # BLD: 0.2 K/UL (ref 0–1)
MONOCYTES NFR BLD AUTO: 3 % (ref 5–13)
NEUTS SEG # BLD: 3.7 K/UL (ref 1.8–8)
NEUTS SEG NFR BLD AUTO: 52 % (ref 32–75)
PLATELET # BLD AUTO: 206 K/UL (ref 150–400)
POTASSIUM SERPL-SCNC: 3.4 MMOL/L (ref 3.5–5.1)
PROT SERPL-MCNC: 7.5 G/DL (ref 6.4–8.2)
RBC # BLD AUTO: 4.89 M/UL (ref 3.8–5.2)
SODIUM SERPL-SCNC: 140 MMOL/L (ref 136–145)
URATE SERPL-MCNC: 5.6 MG/DL (ref 2.6–6)
WBC # BLD AUTO: 6.9 K/UL (ref 3.6–11)

## 2017-08-09 RX ORDER — DICLOFENAC SODIUM 75 MG/1
75 TABLET, DELAYED RELEASE ORAL 2 TIMES DAILY
Qty: 30 TAB | Refills: 0 | Status: SHIPPED | OUTPATIENT
Start: 2017-08-09 | End: 2019-05-19

## 2017-08-09 RX ORDER — HYDROCODONE BITARTRATE AND ACETAMINOPHEN 5; 325 MG/1; MG/1
1 TABLET ORAL
Qty: 20 TAB | Refills: 0 | Status: SHIPPED | OUTPATIENT
Start: 2017-08-09 | End: 2019-05-19

## 2017-08-09 NOTE — ED PROVIDER NOTES
HPI Comments: Homer Singletary is a 46 y.o. female with PMHx of HTN and DM, presenting ambulatory to ED c/o right hand pain/swelling/warmth since yesterday. She notes her hand is pruritic and burning. Pt rates current pain 7/10. She denies taking pain medications. Pt notes she took benadryl yesterday. She reports history of similar symptoms with gout. Pt notes she has only had gout in her feet. She reports history of arthritis in her hip. Pt states she does not take gout medication. She denies recent injury or insect bite. Pt denies recent new soap, lotion, or detergent. She specifically denies fever or N/V/D. Pt notes she is driving herself home. PCP: Dax Berg MD  Social Hx: current every day smoker (1 ppd); - EtOH; - drug use. There are no other complaints, changes, or physical findings at this time. Written by DANICA Corrales, as dictated by Balbina Suh PA-C. The history is provided by the patient. Past Medical History:   Diagnosis Date    Chronic pelvic pain in female 2014    Depression     Diabetes ([address]     Hypertension     Obesity (BMI 35.0-39.9 without comorbidity)     SHAGUFTA on CPAP 3/9/2017    Psychiatric disorder     depression    Recurrent major depression ([address] 2010    Suicidal thoughts     Thyroid nodule 10/1/2015    Unspecified sleep apnea     uses cpap       Past Surgical History:   Procedure Laterality Date    ABDOMEN SURGERY PROC UNLISTED      cholecystectomy    HX  SECTION      HX  SECTION      HX HEENT      head and neck surgery     HX HYSTERECTOMY      HX ORTHOPAEDIC      L ankle surgery     HX TUBAL LIGATION  1998    PPBTL         Family History:   Problem Relation Age of Onset   [facility] Cancer Mother 61     Breast cancer    Heart Disease Father 50     [facility] Asthma Sister     Heart Disease Brother     Hypertension Brother     Hypertension Brother     Diabetes Brother     Lung Disease Brother COPD       Social History     Social History    Marital status: SINGLE     Spouse name: N/A    Number of children: N/A    Years of education: N/A     Occupational History    Not on file. Social History Main Topics    Smoking status: Current Every Day Smoker     Packs/day: 1.00     Years: 20.00    Smokeless tobacco: Never Used    Alcohol use No    Drug use: No    Sexual activity: Yes     Partners: Male     Birth control/ protection: Surgical     Other Topics Concern    Not on file     Social History Narrative         ALLERGIES: Zantac [ranitidine hcl]    Review of Systems   Constitutional: Negative. Negative for fever. HENT: Negative. Eyes: Negative. Respiratory: Negative. Cardiovascular: Negative. Gastrointestinal: Negative. Negative for constipation, diarrhea, nausea and vomiting. Denies liver disease   Endocrine:        Denies thyroid disease   Genitourinary: Negative. Negative for dysuria. Denies kidney disease   Musculoskeletal:        + right hand pain/swelling/warmth/pruritus; Neurological: Negative. All other systems reviewed and are negative. Vitals:    08/08/17 2054   BP: 123/83   Pulse: 86   Resp: 14   Temp: 98.1 °F (36.7 °C)   SpO2: 100%   Weight: 100.9 kg (222 lb 7.1 oz)   Height: 5' (1.524 m)            Physical Exam   Constitutional: She is oriented to person, place, and time. She appears well-developed and well-nourished. No distress. HENT:   Head: Normocephalic and atraumatic. Right Ear: External ear normal.   Left Ear: External ear normal.   Nose: Nose normal.   Mouth/Throat: Oropharynx is clear and moist. No oropharyngeal exudate. Eyes: Conjunctivae and EOM are normal. Pupils are equal, round, and reactive to light. Right eye exhibits no discharge. Left eye exhibits no discharge. No scleral icterus. Neck: Normal range of motion. Neck supple. No tracheal deviation present.    Cardiovascular: Normal rate, regular rhythm, normal heart sounds and intact distal pulses. Exam reveals no gallop and no friction rub. No murmur heard. Pulmonary/Chest: Effort normal and breath sounds normal. No respiratory distress. She has no wheezes. She has no rales. She exhibits no tenderness. Abdominal: Soft. Bowel sounds are normal. She exhibits no distension and no mass. There is no tenderness. There is no rebound and no guarding. Musculoskeletal:   Marked swelling to posterior aspect of right hand with exquisite tenderness; CR is less than 3 s; NVI distally; no red streaks, no break in the skin, no wounds, no pus; palmar aspect of right hand appears normal;   Lymphadenopathy:     She has no cervical adenopathy. Neurological: She is alert and oriented to person, place, and time. No cranial nerve deficit. Skin: Skin is warm and dry. No rash noted. No erythema. Psychiatric: She has a normal mood and affect. Her behavior is normal.   Nursing note and vitals reviewed. MDM  Number of Diagnoses or Management Options  Arthritis of hand:   Swelling of right hand:   Diagnosis management comments: DDx: gout, cellulitis, allergic reaction. Amount and/or Complexity of Data Reviewed  Clinical lab tests: ordered and reviewed  Tests in the radiology section of CPT®: ordered and reviewed  Review and summarize past medical records: yes  Independent visualization of images, tracings, or specimens: yes    Patient Progress  Patient progress: stable    Procedures    Procedure Note - Ace Wrap Placement:  12:26 AM  Performed by: Judy Shah PA-C  Neurovascularly intact prior to tx. An Ace Wrap was placed on pt's right hand. Joint was placed in neutral position. Neurovascularly intact after tx. The procedure took 1-15 minutes, and pt tolerated well.   Written by Manish Luna ED Scribe, as dictated by Judy Shah PA-C.    LABORATORY TESTS:  Recent Results (from the past 12 hour(s))   CBC WITH AUTOMATED DIFF    Collection Time: 08/08/17 11:48 PM Result Value Ref Range    WBC 6.9 3.6 - 11.0 K/uL    RBC 4.89 3.80 - 5.20 M/uL    HGB 11.6 11.5 - 16.0 g/dL    HCT 35.6 35.0 - 47.0 %    MCV 72.8 (L) 80.0 - 99.0 FL    MCH 23.7 (L) 26.0 - 34.0 PG    MCHC 32.6 30.0 - 36.5 g/dL    RDW 19.0 (H) 11.5 - 14.5 %    PLATELET 620 234 - 157 K/uL    NEUTROPHILS 52 32 - 75 %    LYMPHOCYTES 42 12 - 49 %    MONOCYTES 3 (L) 5 - 13 %    EOSINOPHILS 3 0 - 7 %    BASOPHILS 0 0 - 1 %    ABS. NEUTROPHILS 3.7 1.8 - 8.0 K/UL    ABS. LYMPHOCYTES 2.9 0.8 - 3.5 K/UL    ABS. MONOCYTES 0.2 0.0 - 1.0 K/UL    ABS. EOSINOPHILS 0.2 0.0 - 0.4 K/UL    ABS. BASOPHILS 0.0 0.0 - 0.1 K/UL   METABOLIC PANEL, COMPREHENSIVE    Collection Time: 08/08/17 11:48 PM   Result Value Ref Range    Sodium 140 136 - 145 mmol/L    Potassium 3.4 (L) 3.5 - 5.1 mmol/L    Chloride 109 (H) 97 - 108 mmol/L    CO2 26 21 - 32 mmol/L    Anion gap 5 5 - 15 mmol/L    Glucose 84 65 - 100 mg/dL    BUN 9 6 - 20 MG/DL    Creatinine 0.89 0.55 - 1.02 MG/DL    BUN/Creatinine ratio 10 (L) 12 - 20      GFR est AA >60 >60 ml/min/1.73m2    GFR est non-AA >60 >60 ml/min/1.73m2    Calcium 8.4 (L) 8.5 - 10.1 MG/DL    Bilirubin, total 0.3 0.2 - 1.0 MG/DL    ALT (SGPT) 20 12 - 78 U/L    AST (SGOT) 14 (L) 15 - 37 U/L    Alk. phosphatase 76 45 - 117 U/L    Protein, total 7.5 6.4 - 8.2 g/dL    Albumin 3.4 (L) 3.5 - 5.0 g/dL    Globulin 4.1 (H) 2.0 - 4.0 g/dL    A-G Ratio 0.8 (L) 1.1 - 2.2     URIC ACID    Collection Time: 08/08/17 11:48 PM   Result Value Ref Range    Uric acid 5.6 2.6 - 6.0 MG/DL       IMAGING RESULTS:  EXAM:  XR HAND RT MIN 3 V     INDICATION:  swelling hx of gout and arthritis.     COMPARISON: 1/3/2013     FINDINGS: Three views of the right hand demonstrate no fracture or other acute  osseous or articular abnormality. There are scattered degenerative changes,  most severely involving the radiocarpal joint. There is no evidence of erosive  arthropathy.  There is diffuse soft tissue swelling.     IMPRESSION  IMPRESSION: Diffuse soft tissue swelling. No acute osseous abnormality.               MEDICATIONS GIVEN:  Medications   ketorolac (TORADOL) injection 30 mg (30 mg IntraVENous Given 8/8/17 3919)       IMPRESSION:  1. Swelling of right hand    2. Arthritis of hand        PLAN:  1. Current Discharge Medication List      START taking these medications    Details   HYDROcodone-acetaminophen (NORCO) 5-325 mg per tablet Take 1 Tab by mouth every four (4) hours as needed for Pain. Max Daily Amount: 6 Tabs. Qty: 20 Tab, Refills: 0      diclofenac EC (VOLTAREN) 75 mg EC tablet Take 1 Tab by mouth two (2) times a day. Indications: OSTEOARTHRITIS  Qty: 30 Tab, Refills: 0           2. Follow-up Information     Follow up With Details Comments [address]  [phone]      [facility] EMERGENCY DEPT  If symptoms worsen [address]  [address] [facility]  [phone]        Return to ED if worse     DISCHARGE NOTE  12:36 AM  The patient has been re-evaluated and is ready for discharge. Reviewed available results with patient. Counseled pt on diagnosis and care plan. Pt has expressed understanding, and all questions have been answered. Pt agrees with plan and agrees to F/U as recommended, or return to the ED if their sxs worsen. Discharge instructions have been provided and explained to the pt, along with reasons to return to the ED. Written by Renuka Reddy, ED Scribe, as dictated by Cm Barriga PA-C. This note is prepared by Renuka Reddy, acting as Scribe for [facility]. Cm Barriga PA-C: The scribe's documentation has been prepared under my direction and personally reviewed by me in its entirety. I confirm that the note above accurately reflects all work, treatment, procedures, and medical decision making performed by me.

## 2017-08-09 NOTE — ED NOTES
Pt reports right hand pain. Right hand is warm, edematous, with palpable pulses. <3sec cap refill. Pt reported pain \"since yesterday, took 25mg Benadyl. \" Recent new medication Topamax \"but I started that last week. \" Denies any outdoor activities or insect bites, new lotions/detergents/soaps/injuries/trauma or falls. Minimal relief from Benadryl taken yesterday.

## 2017-08-09 NOTE — ED NOTES
Tamra Lesch, PA has reviewed discharge instructions with the patient. The patient verbalized understanding. Pt. A&Ox4, respirations even and unlabored. VS stable as noted in flowsheet. Declined wheelchair, ambulatory from department with paperwork in hand.

## 2017-08-09 NOTE — DISCHARGE INSTRUCTIONS
Osteoarthritis: Care Instructions  Your Care Instructions    Arthritis is a common health problem in which the joints are inflamed. There are several kinds of arthritis. Osteoarthritis is caused by a breakdown of cartilage, the hard, thick tissue that cushions the joints. It causes pain, stiffness, and swelling, often in the spine, fingers, hips, and knees. Osteoarthritis can happen at any age, but it is most common in older people. Osteoarthritis never goes away completely, but it can be controlled. Medicine and home treatment can reduce the pain and prevent the arthritis from getting worse. Follow-up care is a key part of your treatment and safety. Be sure to make and go to all appointments, and call your doctor if you are having problems. It's also a good idea to know your test results and keep a list of the medicines you take. How can you care for yourself at home? · Take a warm shower or bath in the morning to relieve stiffness. Avoid sitting still afterwards. · If the joint is not swollen, use moist heat, like a warm, damp towel, for 20 to 30 minutes, 2 or 3 times a day. Do not use heat on a swollen joint. · If the joint is swollen, use ice or cold packs for 10 to 20 minutes, once an hour. Cold will help relieve pain and reduce inflammation. Put a thin cloth between the ice and your skin. · To prevent stiffness, gently move the joint through its full range of motion several times a day. · If the joint hurts, avoid activities that put a strain on it for a few days. Take rest breaks throughout the day. · Get regular exercise. Walking, swimming, yoga, biking, gabriel chi, and water aerobics are good exercises that are gentle on the joints. · Reach and stay at a healthy weight. If you need to lose or maintain weight, regular exercise and a healthy diet will help. Extra weight can strain the joints, especially the knees and hips, and make the pain worse. Losing even a few pounds may help.   · Take pain medicines exactly as directed. ¨ If the doctor gave you a prescription medicine for pain, take it as prescribed. ¨ If you are not taking a prescription pain medicine, ask your doctor if you can take an over-the-counter medicine. When should you call for help? Call your doctor now or seek immediate medical care if:  · The pain is so bad that you cannot use the joint. · You have sudden back pain with weakness in your legs or loss of bowel or bladder control. · Your stools are black and tarlike or have streaks of blood. · You have severe pain and swelling in more than one joint. Watch closely for changes in your health, and be sure to contact your doctor if:  · You have side effects from the medicines, like belly pain, ongoing heartburn, or nausea. · Joint pain continues for more than 6 weeks, and home treatment is not helping. Where can you learn more? Go to http://tinOneHealth Solutionsviv.info/. Enter L995 in the search box to learn more about \"Osteoarthritis: Care Instructions. \"  Current as of: November 28, 2016  Content Version: 11.3  © 2257-5232 [employer]. Care instructions adapted under license by [employer] (which disclaims liability or warranty for this information). If you have questions about a medical condition or this instruction, always ask your healthcare professional. [address] any warranty or liability for your use of this information. Hand Arthritis: Exercises  Your Care Instructions  Here are some examples of exercises for hand arthritis. Start each exercise slowly. Ease off the exercise if you start to have pain. Your doctor or your physical or occupational therapist will tell you when you can start these exercises and which ones will work best for you. How to do the exercises  Tendon glides    In this exercise, the steps follow one another to a make a continuous movement.   1. With your affected hand, point your fingers and thumb straight up. Your wrist should be relaxed, following the line of your fingers and thumb. 2. Curl your fingers so that the top two joints in them are bent, and your fingers wrap down. Your fingertips should touch or be near the base of your fingers. Your fingers will look like a hook. 3. Make a fist by bending your knuckles. Your thumb can gently rest against your index (pointing) finger. 4. Unwind your fingers slightly so that your fingertips can touch the base of your palm. Your thumb can rest against your index finger. 5. Move back to your starting position, with your fingers and thumb pointing up. 6. Repeat the series of motions 8 to 12 times. 7. Switch hands and repeat steps 1 through 6, even if only one hand is sore. Intrinsic flexion    1. Rest your affected hand on a table and bend the large joints where your fingers connect to your hand. Keep your thumb and the other joints in your fingers straight. 2. Slowly straighten your fingers. Your wrist should be relaxed, following the line of your fingers and thumb. 3. Move back to your starting position, with your hand bent. 4. Repeat 8 to 12 times. 5. Switch hands and repeat steps 1 through 4, even if only one hand is sore. Finger extension    1. Place your affected hand flat on a table. 2. Lift and then lower one finger at a time off the table. 3. Repeat 8 to 12 times. 4. Switch hands and repeat steps 1 through 3, even if only one hand is sore. MP extension    1. Place your good hand on a table, palm up. Put your affected hand on top of your good hand with your fingers wrapped around the thumb of your good hand like you are making a fist.  2. Slowly uncurl the joints of your affected hand where your fingers connect to your hand so that only the top two joints of your fingers are bent. Your fingers will look like a hook. 3. Move back to your starting position, with your fingers wrapped around your good thumb.   4. Repeat 8 to 12 times.  5. Switch hands and repeat steps 1 through 4, even if only one hand is sore. PIP extension (with MP extension)    1. Place your good hand on a table, palm up. Put your affected hand on top of your good hand, palm up. 2. Use the thumb and fingers of your good hand to grasp below the middle joint of one finger of your affected hand. 3. Straighten the last two joints of that finger. 4. Repeat 8 to 12 times. 5. Repeat steps 1 through 4 with each finger. 6. Switch hands and repeat steps 1 through 5, even if only one hand is sore. DIP flexion    1. With your good hand, grasp one finger of your affected hand. Your thumb will be on the top side of your finger just below the joint that is closest to your fingernail. 2. Slowly bend your affected finger only at the joint closest to your fingernail. 3. Repeat 8 to 12 times. 4. Repeat steps 1 through 3 with each finger. 5. Switch hands and repeat steps 1 through 4, even if only one hand is sore. Follow-up care is a key part of your treatment and safety. Be sure to make and go to all appointments, and call your doctor if you are having problems. It's also a good idea to know your test results and keep a list of the medicines you take. Where can you learn more? Go to http://tin-viv.info/. Enter F111 in the search box to learn more about \"Hand Arthritis: Exercises. \"  Current as of: March 21, 2017  Content Version: 11.3  © 7917-7791 [employer]. Care instructions adapted under license by [employer] (which disclaims liability or warranty for this information). If you have questions about a medical condition or this instruction, always ask your healthcare professional. [address] any warranty or liability for your use of this information.

## 2017-12-27 ENCOUNTER — OFFICE VISIT (OUTPATIENT)
Dept: SURGERY | Age: 52
End: 2017-12-27

## 2017-12-27 VITALS
DIASTOLIC BLOOD PRESSURE: 82 MMHG | OXYGEN SATURATION: 99 % | TEMPERATURE: 98.1 F | SYSTOLIC BLOOD PRESSURE: 136 MMHG | HEART RATE: 68 BPM | RESPIRATION RATE: 20 BRPM | BODY MASS INDEX: 40.84 KG/M2 | HEIGHT: 60 IN | WEIGHT: 208 LBS

## 2017-12-27 DIAGNOSIS — K43.9 VENTRAL HERNIA WITHOUT OBSTRUCTION OR GANGRENE: Primary | ICD-10-CM

## 2017-12-27 PROBLEM — E66.01 OBESITY, MORBID (HCC): Status: ACTIVE | Noted: 2017-12-27

## 2017-12-27 NOTE — MR AVS SNAPSHOT
Visit Information Date & Time Provider Department Dept. Phone Encounter #  
 12/27/2017  9:30 AM [phone] [id number] Upcoming Health Maintenance Date Due Hepatitis C Screening 1965 FOOT EXAM Q1 11/25/1975 MICROALBUMIN Q1 11/25/1975 EYE EXAM RETINAL OR DILATED Q1 11/25/1975 Pneumococcal 19-64 Medium Risk (1 of 1 - PPSV23) 11/25/1984 DTaP/Tdap/Td series (1 - Tdap) 11/25/1986 HEMOGLOBIN A1C Q6M 8/22/2013 LIPID PANEL Q1 2/18/2014 FOBT Q 1 YEAR AGE 50-75 11/25/2015 PAP AKA CERVICAL CYTOLOGY 6/5/2017 Influenza Age 5 to Adult 8/1/2017 Allergies as of 12/27/2017  Review Complete On: 12/27/2017 By: Yoon Berkowitz MD  
  
 Severity Noted Reaction Type Reactions Zantac [Ranitidine Hcl] Medium 07/27/2010   Side Effect Hives Current Immunizations  Reviewed on 2/17/2013 No immunizations on file. Not reviewed this visit You Were Diagnosed With   
  
 Codes Comments Ventral hernia without obstruction or gangrene    -  Primary ICD-10-CM: K43.9 ICD-9-CM: 553.20 Vitals BP Pulse Temp Resp Height(growth percentile) Weight(growth percentile) 136/82 68 98.1 °F (36.7 °C) (Oral) 20 5' (1.524 m) 208 lb (94.3 kg) LMP SpO2 BMI OB Status Smoking Status 07/20/2014 99% 40.62 kg/m2 Hysterectomy Current Every Day Smoker BMI and BSA Data Body Mass Index Body Surface Area  
 40.62 kg/m 2 2 m 2 Preferred Pharmacy Pharmacy Name Phone [phone] [address] [phone] Your Updated Medication List  
  
   
This list is accurate as of: 12/27/17 10:49 AM.  Always use your most recent med list.  
  
  
  
  
 ABILIFY 10 mg tablet Generic drug:  ARIPiprazole Take 10 mg by mouth daily. albuterol 90 mcg/actuation inhaler Commonly known as:  PROVENTIL HFA, VENTOLIN HFA, PROAIR HFA  
 Take 2 Puffs by inhalation as needed. alcohol swabs Padm  
  
 atorvastatin 20 mg tablet Commonly known as:  LIPITOR 20 mg = 1 tab each dose, PO, bedtime, 0 Refills CeleXA 40 mg tablet Generic drug:  citalopram  
Take 40 mg by mouth daily. CONTOUR NEXT STRIPS strip Generic drug:  glucose blood VI test strips  
  
 diclofenac EC 75 mg EC tablet Commonly known as:  VOLTAREN Take 1 Tab by mouth two (2) times a day. Indications: OSTEOARTHRITIS HYDROcodone-acetaminophen 5-325 mg per tablet Commonly known as:  Marily Amol Take 1 Tab by mouth every four (4) hours as needed for Pain. Max Daily Amount: 6 Tabs. LANTUS 100 unit/mL injection Generic drug:  insulin glargine 30 Units by SubCUTAneous route nightly. Indications: HYPERGLYCEMIA, TYPE 2 DIABETES MELLITUS  
  
 lisinopril 20 mg tablet Commonly known as:  PRINIVIL, ZESTRIL  
  
 metFORMIN  mg tablet Commonly known as:  GLUCOPHAGE XR  
  
 metoprolol succinate 25 mg XL tablet Commonly known as:  TOPROL-XL Take 1 Tab by mouth daily. Svetlana Pen Needle 32 gauge x 5/32\" Ndle Generic drug:  Insulin Needles (Disposable) daily. omeprazole 40 mg capsule Commonly known as:  PRILOSEC Take 1 capsule by mouth two (2) times a day. TOPAMAX PO Take  by mouth. VICTOZA 2-MARILIN SC  
by SubCUTAneous route. 1.8 MG QD To-Do List   
 01/04/2018 12:00 PM  
  Appointment with [facility] ROOM 01 at [address] [place] [address] ([phone]) Introducing [facility]! [facility] introduces UGE patient portal. Now you can access parts of your medical record, email your doctor's office, and request medication refills online. 1. In your internet browser, go to [email or web address] 2. Click on the First Time User? Click Here link in the Sign In box. You will see the New Member Sign Up page. 3. Enter your Suda Access Code exactly as it appears below. You will not need to use this code after youve completed the sign-up process. If you do not sign up before the expiration date, you must request a new code. · Suda Access Code: [email or web address] Expires: 3/27/2018 10:49 AM 
 
4. Enter the last four digits of your Social Security Number (xxxx) and Date of Birth (mm/dd/yyyy) as indicated and click Submit. You will be taken to the next sign-up page. 5. Create a [employer] ID. This will be your Suda login ID and cannot be changed, so think of one that is secure and easy to remember. 6. Create a Suda password. You can change your password at any time. 7. Enter your Password Reset Question and Answer. This can be used at a later time if you forget your password. 8. Enter your e-mail address. You will receive e-mail notification when new information is available in [address]. 9. Click Sign Up. You can now view and download portions of your medical record. 10. Click the Download Summary menu link to download a portable copy of your medical information. If you have questions, please visit the Frequently Asked Questions section of the Suda website. Remember, Suda is NOT to be used for urgent needs. For medical emergencies, dial 911. Now available from your iPhone and Android! Please provide this summary of care documentation to your next provider. Your primary care clinician is listed as Carmen Person. If you have any questions after today's visit, please call [phone].

## 2017-12-27 NOTE — PROGRESS NOTES
HISTORY OF PRESENT ILLNESS  Alvin Hickman is a 46 y.o. female who is referred by Dr. Mercy Bell for further evaluation of a ventral hernia. HPI Comments: Ms. Minda Alonzo tells me that she has had a bulge in her abdominal wall above the umbilicus for several years now. The bulge is becoming progressively larger and more bothersome to her. Found to have a ventral hernia. She has otherwise been in her usual state of health. Past Medical History:  2014: Chronic pelvic pain in female  No date: Depression  No date: Diabetes ([facility] [address]  No date: Hypertension  No date: Obesity (BMI 35.0-39.9 without comorbidity)  3/9/2017: SHAGUFTA on CPAP  No date: Psychiatric disorder      Comment: depression  2010: Recurrent major depression ([facility] [address]  No date: Suicidal thoughts  10/1/2015: Thyroid nodule  No date: Unspecified sleep apnea      Comment: uses cpap    Past Surgical History:  No date: ABDOMEN SURGERY PROC UNLISTED      Comment: cholecystectomy  : HX  SECTION  1998: HX  SECTION  No date: HX HEENT      Comment: head and neck surgery   No date: HX HYSTERECTOMY  No date: HX ORTHOPAEDIC      Comment: L ankle surgery 2010: HX TUBAL LIGATION      Comment: PPBTL    Review of patient's family history indicates:    Cancer                         Mother                      Comment: Breast cancer    Heart Disease                  Father                      Comment: M.I. Asthma                         Sister                    Heart Disease                  Brother                   Hypertension                   Brother                   Hypertension                   Brother                   Diabetes                       Brother                   Lung Disease                   Brother                     Comment: COPD    Social History: Employment - None. Tobacco - Cigarettes, one pack per day. EtOH - Denies. Review of systems negative except as noted.       Review of Systems Constitutional: Negative for chills and fever. Gastrointestinal: Positive for abdominal pain. Negative for constipation, nausea and vomiting. Musculoskeletal: Positive for joint pain. Neurological: Positive for focal weakness. Psychiatric/Behavioral: Positive for depression. The patient has insomnia. Physical Exam   Constitutional: She appears well-developed and well-nourished. No distress. HENT:   Head: Normocephalic and atraumatic. Eyes: No scleral icterus. Neck: Neck supple. Cardiovascular: Normal rate and regular rhythm. Pulmonary/Chest: Effort normal and breath sounds normal.   Abdominal: Soft. She exhibits no distension. There is no tenderness. There is no rebound and no guarding. A hernia is present. Hernia confirmed positive in the ventral area (Supra Umbilical.). Musculoskeletal: Normal range of motion. Lymphadenopathy:     She has no cervical adenopathy. Neurological: She is alert. Vitals reviewed. ASSESSMENT and PLAN  In view of the findings on H and P, Ms. Ryland Lord should benefit from repair of the ventral hernia as it is bothersome to her. Discussed ventral hernia repair, possibly with mesh, with her today including risks of bleeding, infection, hernia recurrence. She understands and wishes to proceed. I have tentatively scheduled Ms. Babin for surgery on January 18, 2018 at [facility] and will see her back in the office postoperatively. She is agreeable to this plan of action and is most certainly free to contact the office should any questions or concerns arise.      CC: Warner Prado MD        CC: Warner Prado MD

## 2017-12-27 NOTE — PROGRESS NOTES
1. Have you been to the ER, urgent care clinic since your last visit? Hospitalized since your last visit? No    2. Have you seen or consulted any other health care providers outside of the [address] [place] [address] since your last visit? Include any pap smears or colon screening.  No

## 2018-01-02 RX ORDER — BUPIVACAINE HYDROCHLORIDE 2.5 MG/ML
30 INJECTION, SOLUTION EPIDURAL; INFILTRATION; INTRACAUDAL ONCE
Status: CANCELLED | OUTPATIENT
Start: 2018-01-02 | End: 2018-01-02

## 2018-01-04 ENCOUNTER — HOSPITAL ENCOUNTER (OUTPATIENT)
Dept: GENERAL RADIOLOGY | Age: 53
Discharge: HOME | End: 2018-01-04
Attending: SURGERY
Payer: MEDICARE

## 2018-01-04 ENCOUNTER — HOSPITAL ENCOUNTER (OUTPATIENT)
Dept: PREADMISSION TESTING | Age: 53
Discharge: HOME | End: 2018-01-04
Payer: MEDICARE

## 2018-01-04 VITALS
BODY MASS INDEX: 39.17 KG/M2 | HEIGHT: 60 IN | SYSTOLIC BLOOD PRESSURE: 143 MMHG | DIASTOLIC BLOOD PRESSURE: 89 MMHG | TEMPERATURE: 98.3 F | HEART RATE: 79 BPM | RESPIRATION RATE: 18 BRPM | WEIGHT: 199.52 LBS

## 2018-01-04 LAB
ANION GAP SERPL CALC-SCNC: 10 MMOL/L (ref 5–15)
BASOPHILS # BLD: 0 K/UL (ref 0–0.1)
BASOPHILS NFR BLD: 0 % (ref 0–1)
BUN SERPL-MCNC: 16 MG/DL (ref 6–20)
BUN/CREAT SERPL: 19 (ref 12–20)
CALCIUM SERPL-MCNC: 9 MG/DL (ref 8.5–10.1)
CHLORIDE SERPL-SCNC: 106 MMOL/L (ref 97–108)
CO2 SERPL-SCNC: 27 MMOL/L (ref 21–32)
CREAT SERPL-MCNC: 0.86 MG/DL (ref 0.55–1.02)
EOSINOPHIL # BLD: 0.1 K/UL (ref 0–0.4)
EOSINOPHIL NFR BLD: 2 % (ref 0–7)
ERYTHROCYTE [DISTWIDTH] IN BLOOD BY AUTOMATED COUNT: 15.5 % (ref 11.5–14.5)
GLUCOSE SERPL-MCNC: 72 MG/DL (ref 65–100)
HCT VFR BLD AUTO: 37.3 % (ref 35–47)
HGB BLD-MCNC: 12.8 G/DL (ref 11.5–16)
LYMPHOCYTES # BLD: 2 K/UL (ref 0.8–3.5)
LYMPHOCYTES NFR BLD: 41 % (ref 12–49)
MCH RBC QN AUTO: 27.1 PG (ref 26–34)
MCHC RBC AUTO-ENTMCNC: 34.3 G/DL (ref 30–36.5)
MCV RBC AUTO: 79 FL (ref 80–99)
MONOCYTES # BLD: 0.3 K/UL (ref 0–1)
MONOCYTES NFR BLD: 7 % (ref 5–13)
NEUTS SEG # BLD: 2.5 K/UL (ref 1.8–8)
NEUTS SEG NFR BLD: 50 % (ref 32–75)
PLATELET # BLD AUTO: 222 K/UL (ref 150–400)
POTASSIUM SERPL-SCNC: 3.9 MMOL/L (ref 3.5–5.1)
RBC # BLD AUTO: 4.72 M/UL (ref 3.8–5.2)
SODIUM SERPL-SCNC: 143 MMOL/L (ref 136–145)
WBC # BLD AUTO: 4.9 K/UL (ref 3.6–11)

## 2018-01-04 PROCEDURE — 93005 ELECTROCARDIOGRAM TRACING: CPT

## 2018-01-04 PROCEDURE — 80048 BASIC METABOLIC PNL TOTAL CA: CPT | Performed by: SURGERY

## 2018-01-04 PROCEDURE — 71046 X-RAY EXAM CHEST 2 VIEWS: CPT

## 2018-01-04 PROCEDURE — 36415 COLL VENOUS BLD VENIPUNCTURE: CPT | Performed by: SURGERY

## 2018-01-04 PROCEDURE — 85025 COMPLETE CBC W/AUTO DIFF WBC: CPT | Performed by: SURGERY

## 2018-01-04 NOTE — PERIOP NOTES
[facility]  Preoperative Instructions      Surgery Date 1/18/2018              Time of Arrival  7:30    1. On the day of your surgery, please report to the [facility] Entrance. If arriving prior to 7 AM please enter through the Emergency Room Entrance. 2. You must have a responsible adult to drive you to the hospital, stay at the hospital during your surgery and drive you home. You should have someone stay with you for the first 24 hours after your surgery. You should not drive a car for 24 hours following surgery. 3. Do not have anything to eat or drink ( including water, gum, mints, coffee, juice) after midnight . This may not apply to medications prescribed by your physician. Please note special instructions, if applicable. If you are currently taking Plavix, Coumadin, or other blood-thinning agents, contact your surgeon for instructions. 4. We recommend you do not drink any alcoholic beverages for 24 hours before and after your surgery. 5. Have a list of all current medications, including vitamins, herbal supplements and any other over the counter medications. Stop Asprin and non-steroidal anti-inflammatory drugs (I.e. Advil, Aleve) as directed by your surgeon's office. Stop all vitamins and herbal supplements seven days prior to your surgery. 6. Wear comfortable clothes. Wear glasses instead of contacts. Do not bring any money or jewelry. Do not wear make-up, particularly mascara, the morning of your surgery. Do not wear nail polish, particularly if you are having foot /hand surgery. Wear your hair loose or down, no ponytails, buns, gianni pins or clips. All body piercings must be removed. Please shower before surgery with an antibacterial soap (Safeguard/Dial) and use a clean towel. Do not apply any lotions, powders, cologne, perfume or deodorants afterward.     Do not shave the area around your surgical incision for at least two to three days prior to your surgery. If you wear glasses, contacts, dentures and/or hearing aids, they will be removed prior to surgery. 7. You should understand that if you do not follow these instructions your surgery may be cancelled. If your physical condition changes (I.e. fever, cold or flu) please contact your surgeon as soon as possible. 8. It is important that you be on time. If a situation occurs where you may be late, please call [phone]    9. If you are feeling sick before surgery, call your surgeon. He or she will tell you what to do. If you are sick on the day of your surgery please call [phone].     10. If you have any questions and or problems, please call [phone] (Pre-admission Testing). 11. Your surgery time may be subject to change. You will receive a phone call the evening before your surgery if your time changes. Special Instructions: take Metoprolol the morning of surgery with a sip. Do not take the Lisinopril or any other medications the morning of surgery    MEDICATIONS TO TAKE THE MORNING OF SURGERY WITH A SIP OF WATER:      I understand a pre-operative phone call will be made to verify my surgery time.   In the event that I am not available, I give permission for a message to be left on my answering service and/or with another person?              ___________________      ___________________  _________  (Signature of Patient)          (Witness)                              (Date and Time)

## 2018-01-06 LAB
ATRIAL RATE: 78 BPM
CALCULATED P AXIS, ECG09: 47 DEGREES
CALCULATED R AXIS, ECG10: 30 DEGREES
CALCULATED T AXIS, ECG11: 46 DEGREES
DIAGNOSIS, 93000: NORMAL
P-R INTERVAL, ECG05: 136 MS
Q-T INTERVAL, ECG07: 412 MS
QRS DURATION, ECG06: 78 MS
QTC CALCULATION (BEZET), ECG08: 469 MS
VENTRICULAR RATE, ECG03: 78 BPM

## 2018-01-09 ENCOUNTER — ANESTHESIA EVENT (OUTPATIENT)
Dept: SURGERY | Age: 53
End: 2018-01-09
Payer: MEDICARE

## 2018-01-18 ENCOUNTER — HOSPITAL ENCOUNTER (OUTPATIENT)
Age: 53
Setting detail: OUTPATIENT SURGERY
Discharge: HOME | End: 2018-01-18
Attending: SURGERY | Admitting: SURGERY
Payer: MEDICARE

## 2018-01-18 ENCOUNTER — ANESTHESIA (OUTPATIENT)
Dept: SURGERY | Age: 53
End: 2018-01-18
Payer: MEDICARE

## 2018-01-18 VITALS
OXYGEN SATURATION: 96 % | WEIGHT: 199 LBS | HEIGHT: 60 IN | DIASTOLIC BLOOD PRESSURE: 78 MMHG | TEMPERATURE: 98.2 F | SYSTOLIC BLOOD PRESSURE: 131 MMHG | RESPIRATION RATE: 18 BRPM | BODY MASS INDEX: 39.07 KG/M2 | HEART RATE: 80 BPM

## 2018-01-18 DIAGNOSIS — K43.9 VENTRAL HERNIA WITHOUT OBSTRUCTION OR GANGRENE: Primary | ICD-10-CM

## 2018-01-18 LAB
GLUCOSE BLD STRIP.AUTO-MCNC: 74 MG/DL (ref 65–100)
SERVICE CMNT-IMP: NORMAL

## 2018-01-18 PROCEDURE — 74011250636 HC RX REV CODE- 250/636: Performed by: ANESTHESIOLOGY

## 2018-01-18 PROCEDURE — 74011250636 HC RX REV CODE- 250/636

## 2018-01-18 PROCEDURE — 82962 GLUCOSE BLOOD TEST: CPT

## 2018-01-18 PROCEDURE — 77030010507 HC ADH SKN DERMBND J&J -B: Performed by: SURGERY

## 2018-01-18 PROCEDURE — 76060000034 HC ANESTHESIA 1.5 TO 2 HR: Performed by: SURGERY

## 2018-01-18 PROCEDURE — 88302 TISSUE EXAM BY PATHOLOGIST: CPT | Performed by: SURGERY

## 2018-01-18 PROCEDURE — 74011000250 HC RX REV CODE- 250

## 2018-01-18 PROCEDURE — 77030032490 HC SLV COMPR SCD KNE COVD -B: Performed by: SURGERY

## 2018-01-18 PROCEDURE — 77030031139 HC SUT VCRL2 J&J -A: Performed by: SURGERY

## 2018-01-18 PROCEDURE — 77030002933 HC SUT MCRYL J&J -A: Performed by: SURGERY

## 2018-01-18 PROCEDURE — 76210000020 HC REC RM PH II FIRST 0.5 HR: Performed by: SURGERY

## 2018-01-18 PROCEDURE — 74011000250 HC RX REV CODE- 250: Performed by: SURGERY

## 2018-01-18 PROCEDURE — 77030026438 HC STYL ET INTUB CARD -A: Performed by: ANESTHESIOLOGY

## 2018-01-18 PROCEDURE — 74011000272 HC RX REV CODE- 272: Performed by: SURGERY

## 2018-01-18 PROCEDURE — 77030011640 HC PAD GRND REM COVD -A: Performed by: SURGERY

## 2018-01-18 PROCEDURE — C1781 MESH (IMPLANTABLE): HCPCS | Performed by: SURGERY

## 2018-01-18 PROCEDURE — 76010000153 HC OR TIME 1.5 TO 2 HR: Performed by: SURGERY

## 2018-01-18 PROCEDURE — 77030018836 HC SOL IRR NACL ICUM -A: Performed by: SURGERY

## 2018-01-18 PROCEDURE — 77030008684 HC TU ET CUF COVD -B: Performed by: ANESTHESIOLOGY

## 2018-01-18 PROCEDURE — 77030022704 HC SUT VLOC COVD -B: Performed by: SURGERY

## 2018-01-18 PROCEDURE — 76210000063 HC OR PH I REC FIRST 0.5 HR: Performed by: SURGERY

## 2018-01-18 DEVICE — MESH VENTRALEX ST MED --: Type: IMPLANTABLE DEVICE | Site: ABDOMEN | Status: FUNCTIONAL

## 2018-01-18 RX ORDER — LIDOCAINE HYDROCHLORIDE 10 MG/ML
0.1 INJECTION, SOLUTION EPIDURAL; INFILTRATION; INTRACAUDAL; PERINEURAL AS NEEDED
Status: DISCONTINUED | OUTPATIENT
Start: 2018-01-18 | End: 2018-01-18 | Stop reason: HOSPADM

## 2018-01-18 RX ORDER — ONDANSETRON 2 MG/ML
INJECTION INTRAMUSCULAR; INTRAVENOUS AS NEEDED
Status: DISCONTINUED | OUTPATIENT
Start: 2018-01-18 | End: 2018-01-18 | Stop reason: HOSPADM

## 2018-01-18 RX ORDER — SODIUM CHLORIDE, SODIUM LACTATE, POTASSIUM CHLORIDE, CALCIUM CHLORIDE 600; 310; 30; 20 MG/100ML; MG/100ML; MG/100ML; MG/100ML
50 INJECTION, SOLUTION INTRAVENOUS CONTINUOUS
Status: DISCONTINUED | OUTPATIENT
Start: 2018-01-18 | End: 2018-01-18 | Stop reason: HOSPADM

## 2018-01-18 RX ORDER — FENTANYL CITRATE 50 UG/ML
INJECTION, SOLUTION INTRAMUSCULAR; INTRAVENOUS AS NEEDED
Status: DISCONTINUED | OUTPATIENT
Start: 2018-01-18 | End: 2018-01-18 | Stop reason: HOSPADM

## 2018-01-18 RX ORDER — ROCURONIUM BROMIDE 10 MG/ML
INJECTION, SOLUTION INTRAVENOUS AS NEEDED
Status: DISCONTINUED | OUTPATIENT
Start: 2018-01-18 | End: 2018-01-18 | Stop reason: HOSPADM

## 2018-01-18 RX ORDER — SUCCINYLCHOLINE CHLORIDE 20 MG/ML
INJECTION INTRAMUSCULAR; INTRAVENOUS AS NEEDED
Status: DISCONTINUED | OUTPATIENT
Start: 2018-01-18 | End: 2018-01-18 | Stop reason: HOSPADM

## 2018-01-18 RX ORDER — SODIUM CHLORIDE 0.9 % (FLUSH) 0.9 %
5-10 SYRINGE (ML) INJECTION EVERY 8 HOURS
Status: DISCONTINUED | OUTPATIENT
Start: 2018-01-18 | End: 2018-01-18 | Stop reason: HOSPADM

## 2018-01-18 RX ORDER — MIDAZOLAM HYDROCHLORIDE 1 MG/ML
INJECTION, SOLUTION INTRAMUSCULAR; INTRAVENOUS AS NEEDED
Status: DISCONTINUED | OUTPATIENT
Start: 2018-01-18 | End: 2018-01-18 | Stop reason: HOSPADM

## 2018-01-18 RX ORDER — BUPIVACAINE HYDROCHLORIDE 2.5 MG/ML
30 INJECTION, SOLUTION EPIDURAL; INFILTRATION; INTRACAUDAL ONCE
Status: COMPLETED | OUTPATIENT
Start: 2018-01-18 | End: 2018-01-18

## 2018-01-18 RX ORDER — PROPOFOL 10 MG/ML
INJECTION, EMULSION INTRAVENOUS AS NEEDED
Status: DISCONTINUED | OUTPATIENT
Start: 2018-01-18 | End: 2018-01-18 | Stop reason: HOSPADM

## 2018-01-18 RX ORDER — LIDOCAINE HYDROCHLORIDE 20 MG/ML
INJECTION, SOLUTION EPIDURAL; INFILTRATION; INTRACAUDAL; PERINEURAL AS NEEDED
Status: DISCONTINUED | OUTPATIENT
Start: 2018-01-18 | End: 2018-01-18 | Stop reason: HOSPADM

## 2018-01-18 RX ORDER — SODIUM CHLORIDE 0.9 % (FLUSH) 0.9 %
5-10 SYRINGE (ML) INJECTION AS NEEDED
Status: DISCONTINUED | OUTPATIENT
Start: 2018-01-18 | End: 2018-01-18 | Stop reason: HOSPADM

## 2018-01-18 RX ORDER — CEFAZOLIN SODIUM IN 0.9 % NACL 2 G/100 ML
PLASTIC BAG, INJECTION (ML) INTRAVENOUS AS NEEDED
Status: DISCONTINUED | OUTPATIENT
Start: 2018-01-18 | End: 2018-01-18 | Stop reason: HOSPADM

## 2018-01-18 RX ORDER — SODIUM CHLORIDE 9 MG/ML
50 INJECTION, SOLUTION INTRAVENOUS CONTINUOUS
Status: DISCONTINUED | OUTPATIENT
Start: 2018-01-18 | End: 2018-01-18 | Stop reason: HOSPADM

## 2018-01-18 RX ORDER — FENTANYL CITRATE 50 UG/ML
25 INJECTION, SOLUTION INTRAMUSCULAR; INTRAVENOUS
Status: DISCONTINUED | OUTPATIENT
Start: 2018-01-18 | End: 2018-01-18 | Stop reason: HOSPADM

## 2018-01-18 RX ORDER — HYDROCODONE BITARTRATE AND ACETAMINOPHEN 5; 325 MG/1; MG/1
1 TABLET ORAL
Qty: 10 TAB | Refills: 0 | Status: SHIPPED | OUTPATIENT
Start: 2018-01-18 | End: 2019-05-19

## 2018-01-18 RX ORDER — HYDROMORPHONE HYDROCHLORIDE 1 MG/ML
0.5 INJECTION, SOLUTION INTRAMUSCULAR; INTRAVENOUS; SUBCUTANEOUS
Status: DISCONTINUED | OUTPATIENT
Start: 2018-01-18 | End: 2018-01-18 | Stop reason: HOSPADM

## 2018-01-18 RX ORDER — KETOROLAC TROMETHAMINE 30 MG/ML
INJECTION, SOLUTION INTRAMUSCULAR; INTRAVENOUS AS NEEDED
Status: DISCONTINUED | OUTPATIENT
Start: 2018-01-18 | End: 2018-01-18 | Stop reason: HOSPADM

## 2018-01-18 RX ORDER — CEFAZOLIN SODIUM IN 0.9 % NACL 2 G/100 ML
PLASTIC BAG, INJECTION (ML) INTRAVENOUS
Status: COMPLETED
Start: 2018-01-18 | End: 2018-01-18

## 2018-01-18 RX ORDER — DEXAMETHASONE SODIUM PHOSPHATE 4 MG/ML
INJECTION, SOLUTION INTRA-ARTICULAR; INTRALESIONAL; INTRAMUSCULAR; INTRAVENOUS; SOFT TISSUE AS NEEDED
Status: DISCONTINUED | OUTPATIENT
Start: 2018-01-18 | End: 2018-01-18 | Stop reason: HOSPADM

## 2018-01-18 RX ADMIN — PROPOFOL 150 MG: 10 INJECTION, EMULSION INTRAVENOUS at 08:28

## 2018-01-18 RX ADMIN — Medication 2 G: at 08:36

## 2018-01-18 RX ADMIN — MIDAZOLAM HYDROCHLORIDE 2 MG: 1 INJECTION, SOLUTION INTRAMUSCULAR; INTRAVENOUS at 08:24

## 2018-01-18 RX ADMIN — ROCURONIUM BROMIDE 10 MG: 10 INJECTION, SOLUTION INTRAVENOUS at 08:28

## 2018-01-18 RX ADMIN — KETOROLAC TROMETHAMINE 30 MG: 30 INJECTION, SOLUTION INTRAMUSCULAR; INTRAVENOUS at 09:45

## 2018-01-18 RX ADMIN — SODIUM CHLORIDE, POTASSIUM CHLORIDE, SODIUM LACTATE AND CALCIUM CHLORIDE: 600; 310; 30; 20 INJECTION, SOLUTION INTRAVENOUS at 08:00

## 2018-01-18 RX ADMIN — FENTANYL CITRATE 100 MCG: 50 INJECTION, SOLUTION INTRAMUSCULAR; INTRAVENOUS at 08:28

## 2018-01-18 RX ADMIN — DEXAMETHASONE SODIUM PHOSPHATE 4 MG: 4 INJECTION, SOLUTION INTRA-ARTICULAR; INTRALESIONAL; INTRAMUSCULAR; INTRAVENOUS; SOFT TISSUE at 08:39

## 2018-01-18 RX ADMIN — LIDOCAINE HYDROCHLORIDE 100 MG: 20 INJECTION, SOLUTION EPIDURAL; INFILTRATION; INTRACAUDAL; PERINEURAL at 08:28

## 2018-01-18 RX ADMIN — SUCCINYLCHOLINE CHLORIDE 140 MG: 20 INJECTION INTRAMUSCULAR; INTRAVENOUS at 08:28

## 2018-01-18 RX ADMIN — ONDANSETRON 4 MG: 2 INJECTION INTRAMUSCULAR; INTRAVENOUS at 08:39

## 2018-01-18 NOTE — PERIOP NOTES
Patient and family members educated on discharge instructions and follow up care by Dr. Den Judge and RN staff. Opportunity given to ask questions and written material provided.  Verbalized understanding

## 2018-01-18 NOTE — ANESTHESIA POSTPROCEDURE EVALUATION
Post-Anesthesia Evaluation and Assessment    Patient: Michael Moss MRN: [id number]  SSN: [id number]    YOB: 1965  Age: 46 y.o. Sex: female       Cardiovascular Function/Vital Signs  Visit Vitals    /78 (BP 1 Location: Left arm, BP Patient Position: At rest)    Pulse 80    Temp 36.8 °C (98.2 °F)    Resp 18    Ht 5' (1.524 m)    Wt 90.3 kg (199 lb)    SpO2 96%    BMI 38.86 kg/m2       Patient is status post general anesthesia for Procedure(s):  REPAIR VENTRAL HERNIA WITH MESH . Nausea/Vomiting: None    Postoperative hydration reviewed and adequate. Pain:  Pain Scale 1: Numeric (0 - 10) (01/18/18 1040)  Pain Intensity 1: 0 (01/18/18 1040)   Managed    Neurological Status:   Neuro (WDL): Exceptions to WDL (01/18/18 1040)  Neuro  Neurologic State: Alert; Appropriate for age;Drowsy (01/18/18 1040)  LUE Motor Response: Purposeful (01/18/18 1040)  LLE Motor Response: Purposeful (01/18/18 1040)  RUE Motor Response: Purposeful (01/18/18 1040)  RLE Motor Response: Purposeful (01/18/18 1040)   At baseline    Mental Status and Level of Consciousness: Arousable    Pulmonary Status:   O2 Device: Room air (01/18/18 1040)   Adequate oxygenation and airway patent    Complications related to anesthesia: None    Post-anesthesia assessment completed.  No concerns    Signed By: Ming Griggs MD     January 18, 2018

## 2018-01-18 NOTE — ANESTHESIA PREPROCEDURE EVALUATION
Anesthetic History   No history of anesthetic complications            Review of Systems / Medical History  Patient summary reviewed and pertinent labs reviewed    Pulmonary        Sleep apnea           Neuro/Psych         Psychiatric history     Cardiovascular    Hypertension              Exercise tolerance: <4 METS     GI/Hepatic/Renal  Within defined limits              Endo/Other    Diabetes  Hypothyroidism  Morbid obesity     Other Findings              Physical Exam    Airway  Mallampati: III  TM Distance: 4 - 6 cm  Neck ROM: normal range of motion, short neck   Mouth opening: Normal     Cardiovascular    Rhythm: regular  Rate: normal         Dental    Dentition: Upper dentition intact and Lower dentition intact     Pulmonary  Breath sounds clear to auscultation               Abdominal  GI exam deferred       Other Findings            Anesthetic Plan    ASA: 3  Anesthesia type: general          Induction: Intravenous  Anesthetic plan and risks discussed with: Patient

## 2018-01-18 NOTE — OP NOTES
[facility]  OPERATIVE REPORT    Hien Christine  MR#: [id number]  : 1965  ACCOUNT #: [de-identified]   DATE OF SERVICE: 2018    PREOPERATIVE DIAGNOSIS:  Incarcerated ventral hernia. POSTOPERATIVE DIAGNOSIS:  Incarcerated ventral hernia. PROCEDURE:  Repair of incarcerated ventral hernia with mesh. SURGEON:  Rayray Munguia MD    ANESTHESIA:  General endotracheal.    ESTIMATED BLOOD LOSS:  Approximately 20 mL. CRYSTALLOID:  900 mL    SPECIMENS:  1. Omentum to pathology. 2.  Hernia sac to pathology. DRAINS:  None. COMPLICATIONS:  None. INDICATIONS FOR SURGERY:  The patient is a 80-year-old female with a symptomatic ventral hernia. The patient is brought to the operating room at this time for open repair, possibly with mesh. The risks of the procedure including, but not limited to infection, bleeding and hernia recurrence were discussed in detail with the patient. The patient understood and wished to proceed. PROCEDURE IN DETAIL:  After consent was obtained, the patient was brought to the operating room where she was placed in the supine position on the operating room table. Following the induction of an adequate level of general anesthesia via endotracheal tube, compression devices were placed on both lower extremities. The abdomen was prepped with ChloraPrep and draped as a sterile field. Local anesthetic was infiltrated and a midline incision above the umbilicus over the hernia was opened sharply. Subcutaneous bleeders were carefully cauterized. Incision was carried down to the hernia sac which was readily identified and carefully opened. Upon doing so, incarcerated omentum was encountered. Some of the omentum appeared inflamed and so the omentum was clamped and divided. The stumps were ligated with 3-0 Vicryl ties. The specimen was passed off the field and submitted for histopathologic evaluation.   The omentum was then easily reduced through the hernia defect. The hernia sac was sharply mobilized down to the fascia and excised. The specimen was passed off the field and submitted for histopathologic evaluation. The edges of the fascia were identified and mobilized such that a primary tension-free hernia repair could be completed. Adhesions between the omentum and anterior abdominal wall were taken down with a combination of blunt and sharp dissection. Wound was irrigated copiously with antibiotic-containing saline, inspected and found to be hemostatic. A medium Ventralex patch was brought on the field after first soaking it in antibiotic containing saline. The mesh was positioned appropriately. Care was taken to ensure that the omentum was between the mesh and the peritoneal contents. The fascia was then closed with interrupted #1 Vicryl figure-of-eight sutures. Care was taken to incorporate the mesh with the suture. The Marlex tails were excised and the remaining leaves split. The mesh and fascia were closed with another #1 Vicryl figure-of-eight suture. The wound was irrigated again with antibiotic-containing saline, inspected and found to be hemostatic. The surgical incision was closed with 2 layers of running 2-0 Vicryl suture, followed by 4-0 Monocryl subcuticular suture to the skin. Additional local anesthetic was infiltrated and the incision was dressed with Dermabond. After the Dermabond dried, a pressure dressing was applied. The patient was awakened from general anesthetic and extubated in the operating room. She was transferred to the stretcher and brought to the recovery room in stable condition and tolerated the procedure well. At the conclusion of the procedure, all sponge counts, instrument counts and needle counts were reported correct x2.       Ira Pierson MD       [facility] /   D: 01/18/2018 10:09     T: 01/18/2018 11:04  JOB #: [id number]  CC: Aiyana Bishop MD

## 2018-01-18 NOTE — DISCHARGE INSTRUCTIONS
Dermabond      How to Care for Your Wound after Its Treated with DERMABOND* topical skin Adhesive  DERMABOND* Topical skin adhesive (2-octyl cyanoacrylate) is a sterile, liquid skin adhesive that holds wound edges together. The film will usually remain in place for 5 to 10 days, then naturally fall off your skin. The following will answer some of your questions and provide instructions for proper care for your wound while it is healing:  CHECK WOUND APPEARANCE   Some swelling, redness, and pain are common with all wounds and normally will go away as the wound heals. If swelling, redness, or pain increases or if the wound feels warm to the tough, contact a doctor. Also contact a doctor if the wound edges reopen or separate. REPLACE BANDAGES   If your wound is bandaged, keep the bandage dry.  Replace the dressing daily until the adhesive film has fallen off or if the bandage should become wet, unless otherwise instructed by your physician.  When changing the dressing, do not place tape directly over the DERMABOND* adhesive film, because removing the tape later may also remove the film. AVOID TOPICAL MEDICATIONS   Do not apply liquid or ointment medications or any other product to your wound while the DERMABOND* adhesive film is in place. These may loosen the film before your wound is healed. KEEP WOUND DRY AND PROTECTED   You may occasionally and briefly wet your wound in the shower or bath. Do not soak or scrub your wound, do not swim, and avoid periods of heavy perspiration until the DERMABOND* adhesive has naturally fallen off. After showering or bathing, gently blot your wound dry with a soft towel. If a protective dressing is being used, apply a fresh, dry bandage, being sure to keep the tape off the DERMABOND* adhesive film.  Apply a clean, dry bandage over the wound if necessary to protect it.    Protect your wound from injury until the skin has had sufficient time to heal.   Do not scratch, rub, or pick at the DERMABOND* adhesive film. This may loosen the film before your wound is healed.  Protect the wound from prolonged exposure to sunlight or tanning lamps while the film is in place. If you have any questions or concerns about this product, please consult your doctor. *[employer]   Dermabond      How to Care for Your Wound after Its Treated with DERMABOND* topical skin Adhesive  DERMABOND* Topical skin adhesive (2-octyl cyanoacrylate) is a sterile, liquid skin adhesive that holds wound edges together. The film will usually remain in place for 5 to 10 days, then naturally fall off your skin. The following will answer some of your questions and provide instructions for proper care for your wound while it is healing:  CHECK WOUND APPEARANCE   Some swelling, redness, and pain are common with all wounds and normally will go away as the wound heals. If swelling, redness, or pain increases or if the wound feels warm to the tough, contact a doctor. Also contact a doctor if the wound edges reopen or separate. REPLACE BANDAGES   If your wound is bandaged, keep the bandage dry.  Replace the dressing daily until the adhesive film has fallen off or if the bandage should become wet, unless otherwise instructed by your physician.  When changing the dressing, do not place tape directly over the DERMABOND* adhesive film, because removing the tape later may also remove the film. AVOID TOPICAL MEDICATIONS   Do not apply liquid or ointment medications or any other product to your wound while the DERMABOND* adhesive film is in place. These may loosen the film before your wound is healed. KEEP WOUND DRY AND PROTECTED   You may occasionally and briefly wet your wound in the shower or bath. Do not soak or scrub your wound, do not swim, and avoid periods of heavy perspiration until the DERMABOND* adhesive has naturally fallen off.   After showering or bathing, gently blot your wound dry with a soft towel. If a protective dressing is being used, apply a fresh, dry bandage, being sure to keep the tape off the DERMABOND* adhesive film.  Apply a clean, dry bandage over the wound if necessary to protect it.  Protect your wound from injury until the skin has had sufficient time to heal.   Do not scratch, rub, or pick at the DERMABOND* adhesive film. This may loosen the film before your wound is healed.  Protect the wound from prolonged exposure to sunlight or tanning lamps while the film is in place. If you have any questions or concerns about this product, please consult your doctor. Emi Saenz     Patient Discharge Instructions    Sigifredo Loy / [id number] : 1965    Admitted 2018 Discharged: 2018       · It is important that you take the medication exactly as they are prescribed. · Keep your medication in the bottles provided by the pharmacist and keep a list of the medication names, dosages, and times to be taken in your wallet. · Do not take other medications without consulting your doctor. What to do at Home    Recommended diet: As Directed. Recommended activity: No Heavy Lifting (Less Than 10-15 Pounds). No Driving While Taking [address]. May Take Shower or [place] Roxo after [facility]. Can Remove Dressing on 2018. If you experience any of the following symptoms Fevers, Chills, Nausea, Vomitting, Redness or Drainage at Surgical Site(s) or Any Other Questions or Concerns Please Call -  [phone]. Follow-up with Dr. Glendy Gee in 10-14 days. Information obtained by :  I understand that if any problems occur once I am at home I am to contact my physician. I understand and acknowledge receipt of the instructions indicated above.                                                                                                                                            Physician's or R.N.'s Signature Date/Time                                                                                                                                              Patient or Representative Signature                                                          Date/Time

## 2018-01-18 NOTE — PERIOP NOTES
Handoff Report from Operating Room to PACU    Report received from Dr. Pierre Mendez and Black Joshua RN regarding Lata Mina. Surgeon(s):  Lindy Wise MD  And Procedure(s) (LRB):  REPAIR VENTRAL HERNIA WITH MESH  (N/A)  confirmed   with allergies and dressings discussed. Anesthesia type, drugs, patient history, complications, estimated blood loss, vital signs, intake and output, and last pain medication, lines and temperature were reviewed.

## 2018-01-18 NOTE — IP AVS SNAPSHOT
[facility] 
 
 
 [address] Rodriguez Patient: Mei Woods MRN: [id number] :1965 A check richard indicates which time of day the medication should be taken. My Medications CHANGE how you take these medications Instructions Each Dose to Equal  
 Morning Noon Evening Bedtime * HYDROcodone-acetaminophen 5-325 mg per tablet Commonly known as:  Roxie Simpson What changed:  Another medication with the same name was added. Make sure you understand how and when to take each. Your last dose was: Your next dose is: Take 1 Tab by mouth every four (4) hours as needed for Pain. Max Daily Amount: 6 Tabs. 1 Tab  
    
   
   
   
  
 * HYDROcodone-acetaminophen 5-325 mg per tablet Commonly known as:  Roxie Simpson What changed: You were already taking a medication with the same name, and this prescription was added. Make sure you understand how and when to take each. Your last dose was: Your next dose is: Take 1 Tab by mouth every four (4) hours as needed for Pain. Max Daily Amount: 6 Tabs. 1 Tab * Notice: This list has 2 medication(s) that are the same as other medications prescribed for you. Read the directions carefully, and ask your doctor or other care provider to review them with you. CONTINUE taking these medications Instructions Each Dose to Equal  
 Morning Noon Evening Bedtime ABILIFY 10 mg tablet Generic drug:  ARIPiprazole Your last dose was: Your next dose is: Take 10 mg by mouth daily. 10 mg  
    
   
   
   
  
 albuterol 90 mcg/actuation inhaler Commonly known as:  PROVENTIL HFA, VENTOLIN HFA, PROAIR HFA Your last dose was: Your next dose is: Take 2 Puffs by inhalation as needed. 2 Puff  
    
   
   
   
  
 alcohol swabs Padm Your last dose was: Your next dose is:    
   
   
      
   
   
   
  
 atorvastatin 20 mg tablet Commonly known as:  LIPITOR Your last dose was: Your next dose is:    
   
   
 20 mg = 1 tab each dose, PO, bedtime, 0 Refills CeleXA 40 mg tablet Generic drug:  citalopram  
   
Your last dose was: Your next dose is: Take 40 mg by mouth daily. 40 mg CONTOUR NEXT STRIPS strip Generic drug:  glucose blood VI test strips Your last dose was: Your next dose is:    
   
   
      
   
   
   
  
 diclofenac EC 75 mg EC tablet Commonly known as:  VOLTAREN Your last dose was: Your next dose is: Take 1 Tab by mouth two (2) times a day. Indications: OSTEOARTHRITIS 75 mg  
    
   
   
   
  
 LANTUS 100 unit/mL injection Generic drug:  insulin glargine Your last dose was: Your next dose is:    
   
   
 30 Units by SubCUTAneous route nightly. Indications: HYPERGLYCEMIA, TYPE 2 DIABETES MELLITUS  
 30 Units  
    
   
   
   
  
 lisinopril 20 mg tablet Commonly known as:  Prince Keller Your last dose was: Your next dose is:    
   
   
      
   
   
   
  
 metFORMIN  mg tablet Commonly known as:  GLUCOPHAGE XR Your last dose was: Your next dose is:    
   
   
      
   
   
   
  
 metoprolol succinate 25 mg XL tablet Commonly known as:  TOPROL-XL Your last dose was: Your next dose is: Take 1 Tab by mouth daily. 25 mg Svetlana Pen Needle 32 gauge x 5/32\" Ndle Generic drug:  Insulin Needles (Disposable) Your last dose was: Your next dose is:    
   
   
 daily. omeprazole 40 mg capsule Commonly known as:  PRILOSEC Your last dose was: Your next dose is: Take 1 capsule by mouth two (2) times a day.   
 40 mg  
    
   
   
   
  
 TOPAMAX PO Your last dose was: Your next dose is: Take  by mouth. VICTOZA 2-MARILIN SC Your last dose was: Your next dose is:    
   
   
 by SubCUTAneous route. 1.8 MG QD Where to Get Your Medications Information on where to get these meds will be given to you by the nurse or doctor. ! Ask your nurse or doctor about these medications HYDROcodone-acetaminophen 5-325 mg per tablet

## 2018-01-18 NOTE — H&P
Date of Surgery Update:  Chase Camara was seen and examined. History and physical has been reviewed. The patient has been examined. There have been no significant clinical changes since the completion of the originally dated History and Physical.  Patient identified by surgeon; surgical site was confirmed by patient and surgeon. Signed By: Amena Dominguez MD     2018 8:04 AM         Please note from the office and include the additional information below:    Past Medical History  Past Medical History:   Diagnosis Date    Chronic pelvic pain in female 2014    Depression     Diabetes ([address]     Hypertension     Obesity (BMI 35.0-39.9 without comorbidity)     SHAGUFTA on CPAP 3/9/2017    Psychiatric disorder     depression    Recurrent major depression ([address] 2010    Suicidal thoughts     Thyroid nodule 10/1/2015    Unspecified sleep apnea     uses cpap        Past Surgical History  Past Surgical History:   Procedure Laterality Date    ABDOMEN SURGERY PROC UNLISTED      cholecystectomy    HX  SECTION      HX  SECTION      HX HEENT      head and neck surgery     HX HYSTERECTOMY      HX ORTHOPAEDIC      L ankle surgery     HX TUBAL LIGATION      PPBTL        Social History  The patient Chase Camara  reports that she has been smoking. She has a 10.00 pack-year smoking history. She has never used smokeless tobacco. She reports that she does not drink alcohol or use illicit drugs. Family History  Family History   Problem Relation Age of Onset   Paulina Hiss Cancer Mother 61     Breast cancer    Heart Disease Father 50     M. Nicolás Awe Asthma Sister     Heart Disease Brother     Hypertension Brother     Hypertension Brother     Diabetes Brother     Lung Disease Brother      COPD

## 2018-01-18 NOTE — BRIEF OP NOTE
BRIEF OPERATIVE NOTE    Date of Procedure: 1/18/2018   Preoperative Diagnosis:  Incarcerated Ventral Hernia. Postoperative Diagnosis:  Same. Procedure(s):   Repair Incarcerated Ventral Hernia with Mesh. Surgeon(s) and Role:    Ivana Richards MD - Primary  Surgical Staff:  Circ-1: Niecy Clark RN-1: Jean Marie Dneson RN  Event Time In   Incision Start 0644   Incision Close 2335     Anesthesia: General   Estimated Blood Loss: Approx. 20cc. Specimens:   ID Type Source Tests Collected by Time Destination   1 : Omentum Preservative Omentum  Ivana Richards MD 1/18/2018 3747 Pathology   2 : Hernia Sac Preservative Abdomen  Ivana Richards MD 1/18/2018 0900 Pathology      Findings: Incarcerated omentum. Complications: None. Implants:   Implant Name Type Inv.  Item Serial No.  Lot No. LRB No. Used Action   MESH [address] MED --  - SN/A   MESH [address]EN[address] MED --  N/A [employer] [id number] N/A 1 Implanted

## 2018-01-18 NOTE — IP AVS SNAPSHOT
[address] [facility] 
 
 
 [address] Patient: Wallace Franz MRN: [id number] :1965 About your hospitalization You were admitted on:  2018 You last received care in the:  [facility] PACU/HOLDING You were discharged on:  2018 Why you were hospitalized Your primary diagnosis was:  Not on File Follow-up Information Follow up With Details Comments Contact Info Phu Smith MD   [address] [facility] 
[phone] Your Scheduled Appointments   1:00 PM EST  
POST OP with Walker Jon MD  
[address] ([address]) [facility] [address]  
[phone] Discharge Orders None A check richard indicates which time of day the medication should be taken. My Medications CHANGE how you take these medications Instructions Each Dose to Equal  
 Morning Noon Evening Bedtime * HYDROcodone-acetaminophen 5-325 mg per tablet Commonly known as:  John Staff What changed:  Another medication with the same name was added. Make sure you understand how and when to take each. Your last dose was: Your next dose is: Take 1 Tab by mouth every four (4) hours as needed for Pain. Max Daily Amount: 6 Tabs. 1 Tab  
    
   
   
   
  
 * HYDROcodone-acetaminophen 5-325 mg per tablet Commonly known as:  John Staff What changed: You were already taking a medication with the same name, and this prescription was added. Make sure you understand how and when to take each. Your last dose was: Your next dose is: Take 1 Tab by mouth every four (4) hours as needed for Pain. Max Daily Amount: 6 Tabs. 1 Tab * Notice:   This list has 2 medication(s) that are the same as other medications prescribed for you. Read the directions carefully, and ask your doctor or other care provider to review them with you. CONTINUE taking these medications Instructions Each Dose to Equal  
 Morning Noon Evening Bedtime ABILIFY 10 mg tablet Generic drug:  ARIPiprazole Your last dose was: Your next dose is: Take 10 mg by mouth daily. 10 mg  
    
   
   
   
  
 albuterol 90 mcg/actuation inhaler Commonly known as:  PROVENTIL HFA, VENTOLIN HFA, PROAIR HFA Your last dose was: Your next dose is: Take 2 Puffs by inhalation as needed. 2 Puff  
    
   
   
   
  
 alcohol swabs Padm Your last dose was: Your next dose is:    
   
   
      
   
   
   
  
 atorvastatin 20 mg tablet Commonly known as:  LIPITOR Your last dose was: Your next dose is:    
   
   
 20 mg = 1 tab each dose, PO, bedtime, 0 Refills CeleXA 40 mg tablet Generic drug:  citalopram  
   
Your last dose was: Your next dose is: Take 40 mg by mouth daily. 40 mg CONTOUR NEXT STRIPS strip Generic drug:  glucose blood VI test strips Your last dose was: Your next dose is:    
   
   
      
   
   
   
  
 diclofenac EC 75 mg EC tablet Commonly known as:  VOLTAREN Your last dose was: Your next dose is: Take 1 Tab by mouth two (2) times a day. Indications: OSTEOARTHRITIS 75 mg  
    
   
   
   
  
 LANTUS 100 unit/mL injection Generic drug:  insulin glargine Your last dose was: Your next dose is:    
   
   
 30 Units by SubCUTAneous route nightly. Indications: HYPERGLYCEMIA, TYPE 2 DIABETES MELLITUS  
 30 Units  
    
   
   
   
  
 lisinopril 20 mg tablet Commonly known as:  Carmen Castro Your last dose was: Your next dose is: metFORMIN  mg tablet Commonly known as:  GLUCOPHAGE XR Your last dose was: Your next dose is:    
   
   
      
   
   
   
  
 metoprolol succinate 25 mg XL tablet Commonly known as:  TOPROL-XL Your last dose was: Your next dose is: Take 1 Tab by mouth daily. 25 mg Svetlana Pen Needle 32 gauge x 5/32\" Ndle Generic drug:  Insulin Needles (Disposable) Your last dose was: Your next dose is:    
   
   
 daily. omeprazole 40 mg capsule Commonly known as:  PRILOSEC Your last dose was: Your next dose is: Take 1 capsule by mouth two (2) times a day. 40 mg  
    
   
   
   
  
 TOPAMAX PO Your last dose was: Your next dose is: Take  by mouth. VICTOZA 2-MARILIN SC Your last dose was: Your next dose is:    
   
   
 by SubCUTAneous route. 1.8 MG QD Where to Get Your Medications Information on where to get these meds will be given to you by the nurse or doctor. ! Ask your nurse or doctor about these medications HYDROcodone-acetaminophen 5-325 mg per tablet Discharge Instructions Dermabond How to Care for Your Wound after Its Treated with DERMABOND* topical skin Adhesive DERMABOND* Topical skin adhesive (2-octyl cyanoacrylate) is a sterile, liquid skin adhesive that holds wound edges together. The film will usually remain in place for 5 to 10 days, then naturally fall off your skin. The following will answer some of your questions and provide instructions for proper care for your wound while it is healing: CHECK WOUND APPEARANCE 
? Some swelling, redness, and pain are common with all wounds and normally will go away as the wound heals. If swelling, redness, or pain increases or if the wound feels warm to the tough, contact a doctor.   Also contact a doctor if the wound edges reopen or separate. REPLACE BANDAGES 
? If your wound is bandaged, keep the bandage dry. ? Replace the dressing daily until the adhesive film has fallen off or if the bandage should become wet, unless otherwise instructed by your physician. ? When changing the dressing, do not place tape directly over the DERMABOND* adhesive film, because removing the tape later may also remove the film. AVOID TOPICAL MEDICATIONS ? Do not apply liquid or ointment medications or any other product to your wound while the DERMABOND* adhesive film is in place. These may loosen the film before your wound is healed. KEEP WOUND DRY AND PROTECTED ? You may occasionally and briefly wet your wound in the shower or bath. Do not soak or scrub your wound, do not swim, and avoid periods of heavy perspiration until the DERMABOND* adhesive has naturally fallen off. After showering or bathing, gently blot your wound dry with a soft towel. If a protective dressing is being used, apply a fresh, dry bandage, being sure to keep the tape off the DERMABOND* adhesive film. ? Apply a clean, dry bandage over the wound if necessary to protect it. ? Protect your wound from injury until the skin has had sufficient time to heal. 
? Do not scratch, rub, or pick at the DERMABOND* adhesive film. This may loosen the film before your wound is healed. ? Protect the wound from prolonged exposure to sunlight or tanning lamps while the film is in place. If you have any questions or concerns about this product, please consult your doctor. *[employer] Dermabond How to Care for Your Wound after Its Treated with DERMABOND* topical skin Adhesive DERMABOND* Topical skin adhesive (2-octyl cyanoacrylate) is a sterile, liquid skin adhesive that holds wound edges together. The film will usually remain in place for 5 to 10 days, then naturally fall off your skin. The following will answer some of your questions and provide instructions for proper care for your wound while it is healing: CHECK WOUND APPEARANCE 
? Some swelling, redness, and pain are common with all wounds and normally will go away as the wound heals. If swelling, redness, or pain increases or if the wound feels warm to the tough, contact a doctor. Also contact a doctor if the wound edges reopen or separate. REPLACE BANDAGES 
? If your wound is bandaged, keep the bandage dry. ? Replace the dressing daily until the adhesive film has fallen off or if the bandage should become wet, unless otherwise instructed by your physician. ? When changing the dressing, do not place tape directly over the DERMABOND* adhesive film, because removing the tape later may also remove the film. AVOID TOPICAL MEDICATIONS ? Do not apply liquid or ointment medications or any other product to your wound while the DERMABOND* adhesive film is in place. These may loosen the film before your wound is healed. KEEP WOUND DRY AND PROTECTED ? You may occasionally and briefly wet your wound in the shower or bath. Do not soak or scrub your wound, do not swim, and avoid periods of heavy perspiration until the DERMABOND* adhesive has naturally fallen off. After showering or bathing, gently blot your wound dry with a soft towel. If a protective dressing is being used, apply a fresh, dry bandage, being sure to keep the tape off the DERMABOND* adhesive film. ? Apply a clean, dry bandage over the wound if necessary to protect it. ? Protect your wound from injury until the skin has had sufficient time to heal. 
? Do not scratch, rub, or pick at the DERMABOND* adhesive film. This may loosen the film before your wound is healed. ? Protect the wound from prolonged exposure to sunlight or tanning lamps while the film is in place. If you have any questions or concerns about this product, please consult your doctor. *[employer] Patient Discharge Instructions Michael Moss / [id number] : 1965 Admitted 2018 Discharged: 2018 · It is important that you take the medication exactly as they are prescribed. · Keep your medication in the bottles provided by the pharmacist and keep a list of the medication names, dosages, and times to be taken in your wallet. · Do not take other medications without consulting your doctor. What to do at [facility] Recommended diet: As Directed. Recommended activity: No Heavy Lifting (Less Than 10-15 Pounds). No Driving While Taking [address]. May Take Shower or Stout Roxo after [employer]. Can Remove Dressing on 2018. If you experience any of the following symptoms Fevers, Chills, Nausea, Vomitting, Redness or Drainage at Surgical Site(s) or Any Other Questions or Concerns Please Call -  [phone]. Follow-up with Dr. Renzo Jerome in 10-14 days. Information obtained by : 
I understand that if any problems occur once I am at home I am to contact my physician. I understand and acknowledge receipt of the instructions indicated above. Physician's or R.N.'s Signature                                                                  Date/Time Patient or Representative Signature                                                          Date/Time Introducing hospitals & HEALTH SERVICES! Shannan Alanis introduces [employer] Agent patient portal. Now you can access parts of your medical record, email your doctor's office, and request medication refills online. 1. In your internet browser, go to [email or web address] 2. Click on the First Time User? Click Here link in the Sign In box. You will see the New Member Sign Up page. 3. Enter your [employer] Access Code exactly as it appears below. You will not need to use this code after youve completed the sign-up process. If you do not sign up before the expiration date, you must request a new code. · [employer] Access Code: [email or web address] Expires: 3/27/2018 10:49 AM 
 
4. Enter the last four digits of your Social Security Number (xxxx) and Date of Birth (mm/dd/yyyy) as indicated and click Submit. You will be taken to the next sign-up page. 5. Create a [employer] ID. This will be your [employer] login ID and cannot be changed, so think of one that is secure and easy to remember. 6. Create a [employer] password. You can change your password at any time. 7. Enter your Password Reset Question and Answer. This can be used at a later time if you forget your password. 8. Enter your e-mail address. You will receive e-mail notification when new information is available in [address] 9. Click Sign Up. You can now view and download portions of your medical record. 10. Click the Download Summary menu link to download a portable copy of your medical information. If you have questions, please visit the Frequently Asked Questions section of the [employer] website. Remember, [employer] is NOT to be used for urgent needs. For medical emergencies, dial 911. Now available from your iPhone and Android! Providers Seen During Your Hospitalization Provider Specialty Primary office phone Aliya Bower MD General Surgery [phone] Your Primary Care Physician (PCP) Primary Care Physician Office Phone Office Fax Meli Valle [phone] You are allergic to the following Allergen Reactions Zantac (Ranitidine Hcl) Hives Recent Documentation Height Weight BMI OB Status Smoking Status 1.524 m 90.3 kg 38.86 kg/m2 Hysterectomy Current Every Day Smoker Emergency Contacts Name Discharge Info Relation Home Work Mobile Vitaly Maier (Brother) DISCHARGE CAREGIVER [3] Other Relative [6] [phone] Patient Belongings The following personal items are in your possession at time of discharge: 
  Dental Appliances: None         Home Medications: None   Jewelry: None  Clothing:  (street clothes)    Other Valuables: None Please provide this summary of care documentation to your next provider. Signatures-by signing, you are acknowledging that this After Visit Summary has been reviewed with you and you have received a copy. Patient Signature:  ____________________________________________________________ Date:  ____________________________________________________________  
  
Marlyse Leaks Provider Signature:  ____________________________________________________________ Date:  ____________________________________________________________

## 2018-02-01 ENCOUNTER — OFFICE VISIT (OUTPATIENT)
Dept: SURGERY | Age: 53
End: 2018-02-01

## 2018-02-01 VITALS
TEMPERATURE: 98.8 F | SYSTOLIC BLOOD PRESSURE: 130 MMHG | WEIGHT: 199 LBS | OXYGEN SATURATION: 98 % | BODY MASS INDEX: 39.07 KG/M2 | HEIGHT: 60 IN | HEART RATE: 78 BPM | DIASTOLIC BLOOD PRESSURE: 88 MMHG | RESPIRATION RATE: 18 BRPM

## 2018-02-01 DIAGNOSIS — Z87.19 S/P VENTRAL HERNIORRHAPHY: Primary | ICD-10-CM

## 2018-02-01 DIAGNOSIS — Z98.890 S/P VENTRAL HERNIORRHAPHY: Primary | ICD-10-CM

## 2018-02-01 PROBLEM — K43.9 VENTRAL HERNIA WITHOUT OBSTRUCTION OR GANGRENE: Status: RESOLVED | Noted: 2017-12-27 | Resolved: 2018-02-01

## 2018-02-01 NOTE — PROGRESS NOTES
HISTORY OF PRESENT ILLNESS  Alvin Hickman is a 46 y.o. female who returns for post operative evaluation. HPI Comments: Ms. Minda Alonzo is s/p mesh repair of a ventral hernia on 1/18/2018. Discharged to home that day. Doing well since then. Post operative pain resolved. No redness or drainage at surgical site. Review of systems negative except as noted. Post OP Follow Up   The history is provided by the patient. Pertinent negatives include no abdominal pain. Review of Systems   Constitutional: Negative for fever. Gastrointestinal: Negative for abdominal pain, nausea and vomiting. Physical Exam   Constitutional: She appears well-developed and well-nourished. No distress. Cardiovascular: Normal rate and regular rhythm. Pulmonary/Chest: Effort normal and breath sounds normal.   Abdominal: Soft. She exhibits no distension. There is no tenderness. There is no rebound and no guarding. No hernia. Hernia confirmed negative in the ventral area (No apparent recurrence. ). Musculoskeletal: Normal range of motion. Neurological: She is alert. Skin:   Well healed surgical incision. Vitals reviewed. ASSESSMENT and PLAN  Reviewed operative findings with Ms. Minda Alonzo today and reassured her that she is doing very well thus far. Asked her to refrain from heavy lifting or physical exertion for another 2 weeks and then ease back into usual activities as tolerated. Follow up with Dr. Mercy Bell as scheduled. Will see as needed.      CC: Soy Wagner MD

## 2018-02-01 NOTE — MR AVS SNAPSHOT
[address] [place] [address] [address] [address] 
[phone] Patient: Sivakumar Zimmerman MRN: [id number] :1965 Visit Information Date & Time Provider Department Dept. Phone Encounter #  
 2018  1:00 PM [facility] [address] [facility] [id number] Follow-up Instructions Return for Concerns. Tania Restrepo Follow-up and Disposition History Upcoming Health Maintenance Date Due Hepatitis C Screening 1965 FOOT EXAM Q1 1975 MICROALBUMIN Q1 1975 EYE EXAM RETINAL OR DILATED Q1 1975 Pneumococcal 19-64 Medium Risk (1 of 1 - PPSV23) 1984 DTaP/Tdap/Td series (1 - Tdap) 1986 HEMOGLOBIN A1C Q6M 2013 LIPID PANEL Q1 2014 FOBT Q 1 YEAR AGE 50-75 2015 BREAST CANCER SCRN MAMMOGRAM 2016 PAP AKA CERVICAL CYTOLOGY 2017 Influenza Age 5 to Adult 2017 Allergies as of 2018  Review Complete On: 2018 By: Sharee Dickson MD  
  
 Severity Noted Reaction Type Reactions Zantac [Ranitidine Hcl] Medium 2010   Side Effect Hives Current Immunizations  Reviewed on 2013 No immunizations on file. Not reviewed this visit You Were Diagnosed With   
  
 Codes Comments S/P ventral herniorrhaphy    -  Primary ICD-10-CM: Z98.890, Z87.19 ICD-9-CM: V45.89 Vitals BP Pulse Temp Resp Height(growth percentile) Weight(growth percentile) 130/88 (BP 1 Location: Left arm, BP Patient Position: Sitting) 78 98.8 °F (37.1 °C) (Oral) 18 5' (1.524 m) 199 lb (90.3 kg) LMP SpO2 BMI OB Status Smoking Status 2014 98% 38.86 kg/m2 Hysterectomy Current Every Day Smoker BMI and BSA Data Body Mass Index Body Surface Area  
 38.86 kg/m 2 1.96 m 2 Preferred Pharmacy Pharmacy Name Phone [id number] [address] [facility] [address] [phone] Your Updated Medication List  
  
   
This list is accurate as of: 2/1/18  2:25 PM.  Always use your most recent med list.  
  
  
  
  
 ABILIFY 10 mg tablet Generic drug:  ARIPiprazole Take 10 mg by mouth daily. albuterol 90 mcg/actuation inhaler Commonly known as:  PROVENTIL HFA, VENTOLIN HFA, PROAIR HFA Take 2 Puffs by inhalation as needed. alcohol swabs Padm  
  
 atorvastatin 20 mg tablet Commonly known as:  LIPITOR 20 mg = 1 tab each dose, PO, bedtime, 0 Refills CeleXA 40 mg tablet Generic drug:  citalopram  
Take 40 mg by mouth daily. CONTOUR NEXT STRIPS strip Generic drug:  glucose blood VI test strips  
  
 diclofenac EC 75 mg EC tablet Commonly known as:  VOLTAREN Take 1 Tab by mouth two (2) times a day. Indications: OSTEOARTHRITIS  
  
 * HYDROcodone-acetaminophen 5-325 mg per tablet Commonly known as:  Koby Fraction Take 1 Tab by mouth every four (4) hours as needed for Pain. Max Daily Amount: 6 Tabs. * HYDROcodone-acetaminophen 5-325 mg per tablet Commonly known as:  Koby Fraction Take 1 Tab by mouth every four (4) hours as needed for Pain. Max Daily Amount: 6 Tabs. LANTUS 100 unit/mL injection Generic drug:  insulin glargine 30 Units by SubCUTAneous route nightly. Indications: HYPERGLYCEMIA, TYPE 2 DIABETES MELLITUS  
  
 lisinopril 20 mg tablet Commonly known as:  PRINIVIL, ZESTRIL  
  
 metFORMIN  mg tablet Commonly known as:  GLUCOPHAGE XR  
  
 metoprolol succinate 25 mg XL tablet Commonly known as:  TOPROL-XL Take 1 Tab by mouth daily. Svetlana Pen Needle 32 gauge x 5/32\" Ndle Generic drug:  Insulin Needles (Disposable) daily. omeprazole 40 mg capsule Commonly known as:  PRILOSEC Take 1 capsule by mouth two (2) times a day. TOPAMAX PO Take  by mouth. VICTOZA 2-MARILIN SC  
by SubCUTAneous route. 1.8 MG QD  
  
 * Notice:   This list has 2 medication(s) that are the same as other medications prescribed for you. Read the directions carefully, and ask your doctor or other care provider to review them with you. Follow-up Instructions Return for Concerns. Atiya Polanco Introducing \[facility]\"" & HEALTH SERVICES! Susan Williamson introduces [employer] patient portal. Now you can access parts of your medical record, email your doctor's office, and request medication refills online. 1. In your internet browser, go to [email or web address] 2. Click on the First Time User? Click Here link in the Sign In box. You will see the New Member Sign Up page. 3. Enter your [employer] Access Code exactly as it appears below. You will not need to use this code after youve completed the sign-up process. If you do not sign up before the expiration date, you must request a new code. · [employer] Access Code: [email or web address] Expires: 3/27/2018 10:49 AM 
 
4. Enter the last four digits of your Social Security Number (xxxx) and Date of Birth (mm/dd/yyyy) as indicated and click Submit. You will be taken to the next sign-up page. 5. Create a [employer] ID. This will be your [employer] login ID and cannot be changed, so think of one that is secure and easy to remember. 6. Create a [employer] password. You can change your password at any time. 7. Enter your Password Reset Question and Answer. This can be used at a later time if you forget your password. 8. Enter your e-mail address. You will receive e-mail notification when new information is available in [address] 9. Click Sign Up. You can now view and download portions of your medical record. 10. Click the Download Summary menu link to download a portable copy of your medical information. If you have questions, please visit the Frequently Asked Questions section of the [employer] website. Remember, [employer] is NOT to be used for urgent needs. For medical emergencies, dial 911. Now available from your iPhone and Android! Please provide this summary of care documentation to your next provider. Your primary care clinician is listed as Lesly Coreas. If you have any questions after today's visit, please call [phone].

## 2018-02-01 NOTE — PROGRESS NOTES
1. Have you been to the ER, urgent care clinic since your last visit? Hospitalized since your last visit? No    2. Have you seen or consulted any other health care providers outside of the [address] [place] [address] since your last visit? Include any pap smears or colon screening.  No

## 2018-02-08 ENCOUNTER — HOSPITAL ENCOUNTER (EMERGENCY)
Age: 53
Discharge: HOME | End: 2018-02-08
Attending: EMERGENCY MEDICINE
Payer: MEDICARE

## 2018-02-08 VITALS
OXYGEN SATURATION: 100 % | TEMPERATURE: 97.9 F | HEIGHT: 60 IN | WEIGHT: 193.34 LBS | RESPIRATION RATE: 20 BRPM | HEART RATE: 82 BPM | SYSTOLIC BLOOD PRESSURE: 149 MMHG | DIASTOLIC BLOOD PRESSURE: 84 MMHG | BODY MASS INDEX: 37.96 KG/M2

## 2018-02-08 DIAGNOSIS — M25.552 CHRONIC LEFT HIP PAIN: Primary | ICD-10-CM

## 2018-02-08 DIAGNOSIS — F11.90 CHRONIC, CONTINUOUS USE OF OPIOIDS: ICD-10-CM

## 2018-02-08 DIAGNOSIS — F17.200 SMOKING ADDICTION: ICD-10-CM

## 2018-02-08 DIAGNOSIS — G89.29 CHRONIC LEFT HIP PAIN: Primary | ICD-10-CM

## 2018-02-08 PROCEDURE — 99282 EMERGENCY DEPT VISIT SF MDM: CPT

## 2018-02-08 RX ORDER — HYDROCODONE BITARTRATE AND ACETAMINOPHEN 5; 325 MG/1; MG/1
1 TABLET ORAL
Qty: 8 TAB | Refills: 0 | Status: SHIPPED | OUTPATIENT
Start: 2018-02-08 | End: 2019-05-19

## 2018-02-08 NOTE — ED PROVIDER NOTES
EMERGENCY DEPARTMENT HISTORY AND PHYSICAL EXAM      Date: 2/8/2018  Patient Name: Eh Sanders    History of Presenting Illness     Chief Complaint   Patient presents with    Hip Pain     chronic left hip pain, worse since yesterday       History Provided By: Patient    HPI: Eh Sanders, 46 y.o. female with PMHx significant for HTN and DM, presents ambulatory to the ED with cc of exacerbation of her chronic left hip pain that started 2-3 days ago. She states that she ambulates with a cane at baseline, and her hip pain is exacerbated with weight bearing. She reports taking Ibuprofen with no relief. She notes that she has been seen by Orthopedic Surgery in the past, and she was told that she needed a hip replacement. She denies any recent falls/injuries to her hip. She specifically denies fevers, chills, chest pain, or other complaints at this time. There are no other complaints, changes, or physical findings at this time. PCP: Urban Barriga MD    Current Outpatient Prescriptions   Medication Sig Dispense Refill    HYDROcodone-acetaminophen (NORCO) 5-325 mg per tablet Take 1 Tab by mouth every four (4) hours as needed for Pain. Max Daily Amount: 6 Tabs. 8 Tab 0    HYDROcodone-acetaminophen (NORCO) 5-325 mg per tablet Take 1 Tab by mouth every four (4) hours as needed for Pain. Max Daily Amount: 6 Tabs. 10 Tab 0    HYDROcodone-acetaminophen (NORCO) 5-325 mg per tablet Take 1 Tab by mouth every four (4) hours as needed for Pain. Max Daily Amount: 6 Tabs. 20 Tab 0    diclofenac EC (VOLTAREN) 75 mg EC tablet Take 1 Tab by mouth two (2) times a day. Indications: OSTEOARTHRITIS 30 Tab 0    TOPIRAMATE (TOPAMAX PO) Take  by mouth.  LIRAGLUTIDE (VICTOZA 2-MARILIN SC) by SubCUTAneous route. 1.8 MG QD      metoprolol succinate (TOPROL-XL) 25 mg XL tablet Take 1 Tab by mouth daily.  30 Tab 3    lisinopril (PRINIVIL, ZESTRIL) 20 mg tablet   1    atorvastatin (LIPITOR) 20 mg tablet  20 mg = 1 tab each dose, PO, bedtime, 0 Refills      metFORMIN ER (GLUCOPHAGE XR) 500 mg tablet   0    CONTOUR NEXT STRIPS strip   1    alcohol swabs padm   1    omeprazole (PRILOSEC) 40 mg capsule Take 1 capsule by mouth two (2) times a day. 60 capsule 2    albuterol (PROVENTIL HFA, VENTOLIN HFA) 90 mcg/actuation inhaler Take 2 Puffs by inhalation as needed.  ANSHUL PEN NEEDLE 32 x 5/32 \" ndle daily.  insulin glargine (LANTUS) 100 unit/mL injection 30 Units by SubCUTAneous route nightly. Indications: HYPERGLYCEMIA, TYPE 2 DIABETES MELLITUS      ARIPiprazole (ABILIFY) 10 mg tablet Take 10 mg by mouth daily.  citalopram (CELEXA) 40 mg tablet Take 40 mg by mouth daily. Past History     Past Medical History:  Past Medical History:   Diagnosis Date    Chronic pelvic pain in female 2014    Depression     Diabetes ([facility] [address]     Hypertension     Obesity (BMI 35.0-39.9 without comorbidity)     SHAGUFTA on CPAP 3/9/2017    Psychiatric disorder     depression    Recurrent major depression ([facility] [address] 2010    S/P ventral herniorrhaphy 2018    Suicidal thoughts     Thyroid nodule 10/1/2015    Unspecified sleep apnea     uses cpap       Past Surgical History:  Past Surgical History:   Procedure Laterality Date    ABDOMEN SURGERY PROC UNLISTED      cholecystectomy    HX  SECTION      HX  SECTION      HX HEENT      head and neck surgery     HX HERNIA REPAIR  2018    incarcerated ventral hernia repair with mesh    HX HYSTERECTOMY      HX ORTHOPAEDIC      L ankle surgery     HX TUBAL LIGATION  1998    PPBTL       Family History:  Family History   Problem Relation Age of Onset    Cancer Mother 61     Breast cancer    Heart Disease Father 50     M. Jenna Weber Asthma Sister     Heart Disease Brother     Hypertension Brother     Hypertension Brother     Diabetes Brother     Lung Disease Brother      COPD       Social History:  Social History   Substance Use Topics    Smoking status: Current Every Day Smoker     Packs/day: 0.50     Years: 20.00    Smokeless tobacco: Never Used    Alcohol use No       Allergies: Allergies   Allergen Reactions    Zantac [Ranitidine Hcl] Hives       Review of Systems   Review of Systems   Constitutional: Negative for chills, fatigue and fever. HENT: Negative for ear pain and sore throat. Eyes: Negative for pain, redness and visual disturbance. Respiratory: Negative for cough and shortness of breath. Cardiovascular: Negative for chest pain and palpitations. Gastrointestinal: Negative for abdominal pain, nausea and vomiting. Genitourinary: Negative for dysuria, frequency and urgency. Musculoskeletal: Positive for arthralgias (Left hip). Negative for back pain, gait problem, neck pain and neck stiffness. Skin: Negative for rash and wound. Neurological: Negative for dizziness, weakness, light-headedness, numbness and headaches. Physical Exam   Physical Exam   Constitutional: She is oriented to person, place, and time. She appears well-developed and well-nourished. Non-toxic appearance. No distress. HENT:   Head: Normocephalic and atraumatic. Right Ear: External ear normal.   Left Ear: External ear normal.   Nose: Nose normal.   Mouth/Throat: Uvula is midline. No trismus in the jaw. Eyes: Conjunctivae and EOM are normal. Pupils are equal, round, and reactive to light. No scleral icterus. Neck: Normal range of motion and full passive range of motion without pain. Cardiovascular: Normal rate and regular rhythm. Pulmonary/Chest: Effort normal. No accessory muscle usage. No tachypnea. No respiratory distress. She has no decreased breath sounds. She has no wheezes. Abdominal: Soft. There is no tenderness. Musculoskeletal:   Hips have good symmetry. Left hip: No bruising, redness, or swelling. Lateral hip tenderness. Neurological: She is alert and oriented to person, place, and time. She is not disoriented.  No cranial nerve deficit. GCS eye subscore is 4. GCS verbal subscore is 5. GCS motor subscore is 6. Skin: Skin is intact. No rash noted. Psychiatric: She has a normal mood and affect. Her speech is normal.   Nursing note and vitals reviewed. Medical Decision Making   I am the first provider for this patient. I reviewed the vital signs, available nursing notes, past medical history, past surgical history, family history and social history. Vital Signs-Reviewed the patient's vital signs. Patient Vitals for the past 12 hrs:   Temp Pulse Resp BP SpO2   02/08/18 1107 97.9 °F (36.6 °C) 82 20 149/84 100 %       Records Reviewed: Nursing Notes and Old Medical Records    Provider Notes (Medical Decision Making): Afebrile; well appearing; no new injury; presentation reflects a chronic, well documented complaint. Plan as below. ED Course:   Initial assessment performed. The patients presenting problems have been discussed, and they are in agreement with the care plan formulated and outlined with them. I have encouraged them to ask questions as they arise throughout their visit. TOBACCO COUNSELING:  Upon evaluation, pt expressed that they are a current tobacco user. Pt has been counseled on the dangers of smoking and was encouraged to quit as soon as possible in order to decrease further risks to their health. Pt has conveyed their understanding of the risks involved should they continue to use tobacco products. Progress Note:  1:00 PM  The patient was encouraged to follow up with her PCP/Orthopedic Surgery. Pt is stable for discharge. Written by Diann Suh ED Scribgilles, as dictated by Simi Toledo. Critical Care Time: 0 minutes    Discharge Note:  1:00 PM  The pt is ready for discharge. The pt's signs, symptoms, diagnosis, and discharge instructions have been discussed and pt has conveyed their understanding. The pt is to follow up as recommended or return to ER should their symptoms worsen. Plan has been discussed and pt is in agreement. PLAN:  1. Discharge Medication List as of 2/8/2018 12:57 PM      START taking these medications    Details   !! HYDROcodone-acetaminophen (NORCO) 5-325 mg per tablet Take 1 Tab by mouth every four (4) hours as needed for Pain. Max Daily Amount: 6 Tabs., Print, Disp-8 Tab, R-0       !! - Potential duplicate medications found. Please discuss with provider. CONTINUE these medications which have NOT CHANGED    Details   !! HYDROcodone-acetaminophen (NORCO) 5-325 mg per tablet Take 1 Tab by mouth every four (4) hours as needed for Pain. Max Daily Amount: 6 Tabs., Print, Disp-10 Tab, R-0      !! HYDROcodone-acetaminophen (NORCO) 5-325 mg per tablet Take 1 Tab by mouth every four (4) hours as needed for Pain. Max Daily Amount: 6 Tabs., Print, Disp-20 Tab, R-0      diclofenac EC (VOLTAREN) 75 mg EC tablet Take 1 Tab by mouth two (2) times a day. Indications: OSTEOARTHRITIS, Normal, Disp-30 Tab, R-0      TOPIRAMATE (TOPAMAX PO) Take  by mouth., Historical Med      LIRAGLUTIDE (VICTOZA 2-MARILIN SC) by SubCUTAneous route. 1.8 MG QD, Historical Med      metoprolol succinate (TOPROL-XL) 25 mg XL tablet Take 1 Tab by mouth daily. , Normal, Disp-30 Tab, R-3      lisinopril (PRINIVIL, ZESTRIL) 20 mg tablet Historical Med, R-1      atorvastatin (LIPITOR) 20 mg tablet  20 mg = 1 tab each dose, PO, bedtime, 0 Refills, Historical Med      metFORMIN ER (GLUCOPHAGE XR) 500 mg tablet Historical Med, R-0      CONTOUR NEXT STRIPS strip Historical Med, R-1, [place]      alcohol swabs padm Historical Med, R-1      omeprazole (PRILOSEC) 40 mg capsule Take 1 capsule by mouth two (2) times a day., Print, Disp-60 capsule, R-2      albuterol (PROVENTIL HFA, VENTOLIN HFA) 90 mcg/actuation inhaler Take 2 Puffs by inhalation as needed., Historical Med      ANSHUL PEN NEEDLE 32 x 5/32 \" ndle daily. , Historical Med      insulin glargine (LANTUS) 100 unit/mL injection 30 Units by SubCUTAneous route nightly. Indications: HYPERGLYCEMIA, TYPE 2 DIABETES MELLITUS, Historical Med      ARIPiprazole (ABILIFY) 10 mg tablet Take 10 mg by mouth daily. , Historical Med      citalopram (CELEXA) 40 mg tablet Take 40 mg by mouth daily. , Historical Med       !! - Potential duplicate medications found. Please discuss with provider. 2.   Follow-up Information     Follow up With Details Comments Contact Info    Foreign Watkins MD Schedule an appointment as soon as possible for a visit As needed [place] [phone]          Return to ED if worse     Diagnosis     Clinical Impression:   1. Chronic left hip pain    2. Chronic, continuous use of opioids    3. Smoking addiction        Attestations: This note is prepared by Myra Mcleod, acting as a Scribe for Tracie Milton. SLIME Barry: The scribe's documentation has been prepared under my direction and personally reviewed by me in its entirety. I confirm that the notes above accurately reflects all work, treatment, procedures, and medical decision making performed by me. This note will not be viewable in [address]

## 2018-09-13 ENCOUNTER — HOSPITAL ENCOUNTER (OUTPATIENT)
Dept: REHABILITATION | Age: 53
End: 2018-09-26
Attending: PHYSICAL MEDICINE & REHABILITATION | Admitting: PHYSICAL MEDICINE & REHABILITATION

## 2018-09-14 LAB
ANION GAP SERPL CALC-SCNC: 8 MMOL/L (ref 5–15)
APPEARANCE UR: ABNORMAL
BACTERIA URNS QL MICRO: NEGATIVE /HPF
BASOPHILS # BLD: 0 K/UL (ref 0–0.1)
BASOPHILS NFR BLD: 0 % (ref 0–1)
BILIRUB UR QL: NEGATIVE
BUN SERPL-MCNC: 8 MG/DL (ref 6–20)
BUN/CREAT SERPL: 10 (ref 12–20)
CALCIUM SERPL-MCNC: 8.1 MG/DL (ref 8.5–10.1)
CHLORIDE SERPL-SCNC: 105 MMOL/L (ref 97–108)
CO2 SERPL-SCNC: 25 MMOL/L (ref 21–32)
COLOR UR: ABNORMAL
CREAT SERPL-MCNC: 0.84 MG/DL (ref 0.55–1.02)
DIFFERENTIAL METHOD BLD: ABNORMAL
EOSINOPHIL # BLD: 0.2 K/UL (ref 0–0.4)
EOSINOPHIL NFR BLD: 2 % (ref 0–7)
EPITH CASTS URNS QL MICRO: ABNORMAL /LPF
ERYTHROCYTE [DISTWIDTH] IN BLOOD BY AUTOMATED COUNT: 15.4 % (ref 11.5–14.5)
GLUCOSE SERPL-MCNC: 121 MG/DL (ref 65–100)
GLUCOSE UR STRIP.AUTO-MCNC: NEGATIVE MG/DL
HCT VFR BLD AUTO: 24 % (ref 35–47)
HGB BLD-MCNC: 7.9 G/DL (ref 11.5–16)
HGB UR QL STRIP: NEGATIVE
IMM GRANULOCYTES # BLD: 0 K/UL (ref 0–0.04)
IMM GRANULOCYTES NFR BLD AUTO: 1 % (ref 0–0.5)
KETONES UR QL STRIP.AUTO: NEGATIVE MG/DL
LEUKOCYTE ESTERASE UR QL STRIP.AUTO: NEGATIVE
LYMPHOCYTES # BLD: 1.8 K/UL (ref 0.8–3.5)
LYMPHOCYTES NFR BLD: 25 % (ref 12–49)
MCH RBC QN AUTO: 27.1 PG (ref 26–34)
MCHC RBC AUTO-ENTMCNC: 32.9 G/DL (ref 30–36.5)
MCV RBC AUTO: 82.2 FL (ref 80–99)
MONOCYTES # BLD: 0.7 K/UL (ref 0–1)
MONOCYTES NFR BLD: 9 % (ref 5–13)
NEUTS SEG # BLD: 4.6 K/UL (ref 1.8–8)
NEUTS SEG NFR BLD: 63 % (ref 32–75)
NITRITE UR QL STRIP.AUTO: NEGATIVE
NRBC # BLD: 0 K/UL (ref 0–0.01)
NRBC BLD-RTO: 0 PER 100 WBC
PH UR STRIP: 7.5 [PH] (ref 5–8)
PLATELET # BLD AUTO: 137 K/UL (ref 150–400)
PMV BLD AUTO: 10.9 FL (ref 8.9–12.9)
POTASSIUM SERPL-SCNC: 4 MMOL/L (ref 3.5–5.1)
PROT UR STRIP-MCNC: NEGATIVE MG/DL
RBC # BLD AUTO: 2.92 M/UL (ref 3.8–5.2)
RBC #/AREA URNS HPF: ABNORMAL /HPF (ref 0–5)
SODIUM SERPL-SCNC: 138 MMOL/L (ref 136–145)
SP GR UR REFRACTOMETRY: 1.02 (ref 1–1.03)
UR CULT HOLD, URHOLD: NORMAL
UROBILINOGEN UR QL STRIP.AUTO: 4 EU/DL (ref 0.2–1)
WBC # BLD AUTO: 7.3 K/UL (ref 3.6–11)
WBC URNS QL MICRO: ABNORMAL /HPF (ref 0–4)
YEAST URNS QL MICRO: PRESENT

## 2018-09-14 PROCEDURE — 85025 COMPLETE CBC W/AUTO DIFF WBC: CPT | Performed by: PHYSICAL MEDICINE & REHABILITATION

## 2018-09-14 PROCEDURE — 80048 BASIC METABOLIC PNL TOTAL CA: CPT | Performed by: PHYSICAL MEDICINE & REHABILITATION

## 2018-09-14 PROCEDURE — 36415 COLL VENOUS BLD VENIPUNCTURE: CPT | Performed by: PHYSICAL MEDICINE & REHABILITATION

## 2018-09-14 PROCEDURE — 81001 URINALYSIS AUTO W/SCOPE: CPT | Performed by: PHYSICAL MEDICINE & REHABILITATION

## 2018-09-17 LAB
ANION GAP SERPL CALC-SCNC: 10 MMOL/L (ref 5–15)
BASOPHILS # BLD: 0 K/UL (ref 0–0.1)
BASOPHILS NFR BLD: 0 % (ref 0–1)
BUN SERPL-MCNC: 9 MG/DL (ref 6–20)
BUN/CREAT SERPL: 11 (ref 12–20)
CALCIUM SERPL-MCNC: 9 MG/DL (ref 8.5–10.1)
CHLORIDE SERPL-SCNC: 104 MMOL/L (ref 97–108)
CO2 SERPL-SCNC: 26 MMOL/L (ref 21–32)
CREAT SERPL-MCNC: 0.81 MG/DL (ref 0.55–1.02)
DIFFERENTIAL METHOD BLD: ABNORMAL
EOSINOPHIL # BLD: 0.2 K/UL (ref 0–0.4)
EOSINOPHIL NFR BLD: 4 % (ref 0–7)
ERYTHROCYTE [DISTWIDTH] IN BLOOD BY AUTOMATED COUNT: 15.4 % (ref 11.5–14.5)
GLUCOSE SERPL-MCNC: 103 MG/DL (ref 65–100)
HCT VFR BLD AUTO: 25.6 % (ref 35–47)
HGB BLD-MCNC: 8.3 G/DL (ref 11.5–16)
IMM GRANULOCYTES # BLD: 0.1 K/UL (ref 0–0.04)
IMM GRANULOCYTES NFR BLD AUTO: 1 % (ref 0–0.5)
LYMPHOCYTES # BLD: 1.9 K/UL (ref 0.8–3.5)
LYMPHOCYTES NFR BLD: 31 % (ref 12–49)
MCH RBC QN AUTO: 27 PG (ref 26–34)
MCHC RBC AUTO-ENTMCNC: 32.4 G/DL (ref 30–36.5)
MCV RBC AUTO: 83.4 FL (ref 80–99)
MONOCYTES # BLD: 0.5 K/UL (ref 0–1)
MONOCYTES NFR BLD: 8 % (ref 5–13)
NEUTS SEG # BLD: 3.4 K/UL (ref 1.8–8)
NEUTS SEG NFR BLD: 56 % (ref 32–75)
NRBC # BLD: 0.02 K/UL (ref 0–0.01)
NRBC BLD-RTO: 0.3 PER 100 WBC
PLATELET # BLD AUTO: 224 K/UL (ref 150–400)
PMV BLD AUTO: 10.5 FL (ref 8.9–12.9)
POTASSIUM SERPL-SCNC: 3.8 MMOL/L (ref 3.5–5.1)
RBC # BLD AUTO: 3.07 M/UL (ref 3.8–5.2)
SODIUM SERPL-SCNC: 140 MMOL/L (ref 136–145)
WBC # BLD AUTO: 6.1 K/UL (ref 3.6–11)

## 2018-09-17 PROCEDURE — 85025 COMPLETE CBC W/AUTO DIFF WBC: CPT | Performed by: PHYSICAL MEDICINE & REHABILITATION

## 2018-09-17 PROCEDURE — 36415 COLL VENOUS BLD VENIPUNCTURE: CPT | Performed by: PHYSICAL MEDICINE & REHABILITATION

## 2018-09-17 PROCEDURE — 80048 BASIC METABOLIC PNL TOTAL CA: CPT | Performed by: PHYSICAL MEDICINE & REHABILITATION

## 2018-09-20 LAB
ANION GAP SERPL CALC-SCNC: 10 MMOL/L (ref 5–15)
BASOPHILS # BLD: 0 K/UL (ref 0–0.1)
BASOPHILS NFR BLD: 0 % (ref 0–1)
BUN SERPL-MCNC: 13 MG/DL (ref 6–20)
BUN/CREAT SERPL: 16 (ref 12–20)
CALCIUM SERPL-MCNC: 8.9 MG/DL (ref 8.5–10.1)
CHLORIDE SERPL-SCNC: 104 MMOL/L (ref 97–108)
CO2 SERPL-SCNC: 26 MMOL/L (ref 21–32)
CREAT SERPL-MCNC: 0.79 MG/DL (ref 0.55–1.02)
DIFFERENTIAL METHOD BLD: ABNORMAL
EOSINOPHIL # BLD: 0.2 K/UL (ref 0–0.4)
EOSINOPHIL NFR BLD: 3 % (ref 0–7)
ERYTHROCYTE [DISTWIDTH] IN BLOOD BY AUTOMATED COUNT: 15.8 % (ref 11.5–14.5)
GLUCOSE SERPL-MCNC: 112 MG/DL (ref 65–100)
HCT VFR BLD AUTO: 27.3 % (ref 35–47)
HGB BLD-MCNC: 8.7 G/DL (ref 11.5–16)
IMM GRANULOCYTES # BLD: 0.1 K/UL (ref 0–0.04)
IMM GRANULOCYTES NFR BLD AUTO: 2 % (ref 0–0.5)
LYMPHOCYTES # BLD: 2.2 K/UL (ref 0.8–3.5)
LYMPHOCYTES NFR BLD: 31 % (ref 12–49)
MCH RBC QN AUTO: 27 PG (ref 26–34)
MCHC RBC AUTO-ENTMCNC: 31.9 G/DL (ref 30–36.5)
MCV RBC AUTO: 84.8 FL (ref 80–99)
MONOCYTES # BLD: 0.5 K/UL (ref 0–1)
MONOCYTES NFR BLD: 6 % (ref 5–13)
NEUTS SEG # BLD: 4.2 K/UL (ref 1.8–8)
NEUTS SEG NFR BLD: 58 % (ref 32–75)
NRBC # BLD: 0 K/UL (ref 0–0.01)
NRBC BLD-RTO: 0 PER 100 WBC
PLATELET # BLD AUTO: 303 K/UL (ref 150–400)
PMV BLD AUTO: 10 FL (ref 8.9–12.9)
POTASSIUM SERPL-SCNC: 3.7 MMOL/L (ref 3.5–5.1)
RBC # BLD AUTO: 3.22 M/UL (ref 3.8–5.2)
SODIUM SERPL-SCNC: 140 MMOL/L (ref 136–145)
WBC # BLD AUTO: 7.2 K/UL (ref 3.6–11)

## 2018-09-20 PROCEDURE — 85025 COMPLETE CBC W/AUTO DIFF WBC: CPT | Performed by: PHYSICAL MEDICINE & REHABILITATION

## 2018-09-20 PROCEDURE — 80048 BASIC METABOLIC PNL TOTAL CA: CPT | Performed by: PHYSICAL MEDICINE & REHABILITATION

## 2018-09-20 PROCEDURE — 36415 COLL VENOUS BLD VENIPUNCTURE: CPT | Performed by: PHYSICAL MEDICINE & REHABILITATION

## 2018-09-24 LAB
ANION GAP SERPL CALC-SCNC: 7 MMOL/L (ref 5–15)
BASOPHILS # BLD: 0 K/UL (ref 0–0.1)
BASOPHILS NFR BLD: 0 % (ref 0–1)
BUN SERPL-MCNC: 15 MG/DL (ref 6–20)
BUN/CREAT SERPL: 17 (ref 12–20)
CALCIUM SERPL-MCNC: 9.3 MG/DL (ref 8.5–10.1)
CHLORIDE SERPL-SCNC: 102 MMOL/L (ref 97–108)
CO2 SERPL-SCNC: 29 MMOL/L (ref 21–32)
CREAT SERPL-MCNC: 0.89 MG/DL (ref 0.55–1.02)
DIFFERENTIAL METHOD BLD: ABNORMAL
EOSINOPHIL # BLD: 0.2 K/UL (ref 0–0.4)
EOSINOPHIL NFR BLD: 2 % (ref 0–7)
ERYTHROCYTE [DISTWIDTH] IN BLOOD BY AUTOMATED COUNT: 16.1 % (ref 11.5–14.5)
GLUCOSE SERPL-MCNC: 103 MG/DL (ref 65–100)
HCT VFR BLD AUTO: 29.8 % (ref 35–47)
HGB BLD-MCNC: 9.3 G/DL (ref 11.5–16)
IMM GRANULOCYTES # BLD: 0 K/UL (ref 0–0.04)
IMM GRANULOCYTES NFR BLD AUTO: 1 % (ref 0–0.5)
LYMPHOCYTES # BLD: 2.1 K/UL (ref 0.8–3.5)
LYMPHOCYTES NFR BLD: 29 % (ref 12–49)
MCH RBC QN AUTO: 26.9 PG (ref 26–34)
MCHC RBC AUTO-ENTMCNC: 31.2 G/DL (ref 30–36.5)
MCV RBC AUTO: 86.1 FL (ref 80–99)
MONOCYTES # BLD: 0.4 K/UL (ref 0–1)
MONOCYTES NFR BLD: 5 % (ref 5–13)
NEUTS SEG # BLD: 4.4 K/UL (ref 1.8–8)
NEUTS SEG NFR BLD: 63 % (ref 32–75)
NRBC # BLD: 0 K/UL (ref 0–0.01)
NRBC BLD-RTO: 0 PER 100 WBC
PLATELET # BLD AUTO: 305 K/UL (ref 150–400)
PMV BLD AUTO: 9.2 FL (ref 8.9–12.9)
POTASSIUM SERPL-SCNC: 4.3 MMOL/L (ref 3.5–5.1)
RBC # BLD AUTO: 3.46 M/UL (ref 3.8–5.2)
SODIUM SERPL-SCNC: 138 MMOL/L (ref 136–145)
WBC # BLD AUTO: 7.1 K/UL (ref 3.6–11)

## 2018-09-24 PROCEDURE — 80048 BASIC METABOLIC PNL TOTAL CA: CPT | Performed by: PHYSICAL MEDICINE & REHABILITATION

## 2018-09-24 PROCEDURE — 85025 COMPLETE CBC W/AUTO DIFF WBC: CPT | Performed by: PHYSICAL MEDICINE & REHABILITATION

## 2018-09-24 PROCEDURE — 36415 COLL VENOUS BLD VENIPUNCTURE: CPT | Performed by: PHYSICAL MEDICINE & REHABILITATION

## 2018-11-01 ENCOUNTER — HOSPITAL ENCOUNTER (OUTPATIENT)
Dept: GENERAL RADIOLOGY | Age: 53
Discharge: HOME | End: 2018-11-01
Payer: MEDICARE

## 2018-11-01 DIAGNOSIS — R91.8 LUNG NODULES: ICD-10-CM

## 2018-11-01 PROCEDURE — 71046 X-RAY EXAM CHEST 2 VIEWS: CPT

## 2019-05-11 ENCOUNTER — APPOINTMENT (OUTPATIENT)
Dept: CT IMAGING | Age: 54
End: 2019-05-11
Attending: EMERGENCY MEDICINE
Payer: MEDICARE

## 2019-05-11 ENCOUNTER — HOSPITAL ENCOUNTER (EMERGENCY)
Age: 54
Discharge: HOME | End: 2019-05-11
Attending: EMERGENCY MEDICINE
Payer: MEDICARE

## 2019-05-11 VITALS
DIASTOLIC BLOOD PRESSURE: 115 MMHG | TEMPERATURE: 98.3 F | RESPIRATION RATE: 16 BRPM | HEIGHT: 60 IN | WEIGHT: 213.41 LBS | HEART RATE: 66 BPM | SYSTOLIC BLOOD PRESSURE: 171 MMHG | BODY MASS INDEX: 41.9 KG/M2 | OXYGEN SATURATION: 100 %

## 2019-05-11 DIAGNOSIS — N20.1 RIGHT URETERAL STONE: Primary | ICD-10-CM

## 2019-05-11 DIAGNOSIS — R31.0 GROSS HEMATURIA: ICD-10-CM

## 2019-05-11 LAB
ALBUMIN SERPL-MCNC: 3.9 G/DL (ref 3.5–5)
ALBUMIN/GLOB SERPL: 1 {RATIO} (ref 1.1–2.2)
ALP SERPL-CCNC: 96 U/L (ref 45–117)
ALT SERPL-CCNC: 17 U/L (ref 12–78)
ANION GAP SERPL CALC-SCNC: 9 MMOL/L (ref 5–15)
APPEARANCE UR: ABNORMAL
AST SERPL-CCNC: 16 U/L (ref 15–37)
BACTERIA URNS QL MICRO: NEGATIVE /HPF
BASOPHILS # BLD: 0 K/UL (ref 0–0.1)
BASOPHILS NFR BLD: 0 % (ref 0–1)
BILIRUB SERPL-MCNC: 0.3 MG/DL (ref 0.2–1)
BILIRUB UR QL: NEGATIVE
BUN SERPL-MCNC: 10 MG/DL (ref 6–20)
BUN/CREAT SERPL: 10 (ref 12–20)
CALCIUM SERPL-MCNC: 9 MG/DL (ref 8.5–10.1)
CHLORIDE SERPL-SCNC: 109 MMOL/L (ref 97–108)
CO2 SERPL-SCNC: 25 MMOL/L (ref 21–32)
COLOR UR: ABNORMAL
CREAT SERPL-MCNC: 0.97 MG/DL (ref 0.55–1.02)
DIFFERENTIAL METHOD BLD: ABNORMAL
EOSINOPHIL # BLD: 0.1 K/UL (ref 0–0.4)
EOSINOPHIL NFR BLD: 1 % (ref 0–7)
EPITH CASTS URNS QL MICRO: ABNORMAL /LPF
ERYTHROCYTE [DISTWIDTH] IN BLOOD BY AUTOMATED COUNT: 18 % (ref 11.5–14.5)
GLOBULIN SER CALC-MCNC: 4.1 G/DL (ref 2–4)
GLUCOSE SERPL-MCNC: 118 MG/DL (ref 65–100)
GLUCOSE UR STRIP.AUTO-MCNC: NEGATIVE MG/DL
HCT VFR BLD AUTO: 37.1 % (ref 35–47)
HGB BLD-MCNC: 11.9 G/DL (ref 11.5–16)
HGB UR QL STRIP: ABNORMAL
HYALINE CASTS URNS QL MICRO: ABNORMAL /LPF (ref 0–5)
IMM GRANULOCYTES # BLD AUTO: 0 K/UL (ref 0–0.04)
IMM GRANULOCYTES NFR BLD AUTO: 0 % (ref 0–0.5)
KETONES UR QL STRIP.AUTO: NEGATIVE MG/DL
LEUKOCYTE ESTERASE UR QL STRIP.AUTO: ABNORMAL
LYMPHOCYTES # BLD: 2.7 K/UL (ref 0.8–3.5)
LYMPHOCYTES NFR BLD: 39 % (ref 12–49)
MCH RBC QN AUTO: 24.8 PG (ref 26–34)
MCHC RBC AUTO-ENTMCNC: 32.1 G/DL (ref 30–36.5)
MCV RBC AUTO: 77.5 FL (ref 80–99)
MONOCYTES # BLD: 0.4 K/UL (ref 0–1)
MONOCYTES NFR BLD: 5 % (ref 5–13)
NEUTS SEG # BLD: 3.8 K/UL (ref 1.8–8)
NEUTS SEG NFR BLD: 55 % (ref 32–75)
NITRITE UR QL STRIP.AUTO: NEGATIVE
NRBC # BLD: 0 K/UL (ref 0–0.01)
NRBC BLD-RTO: 0 PER 100 WBC
PH UR STRIP: 5.5 [PH] (ref 5–8)
PLATELET # BLD AUTO: 194 K/UL (ref 150–400)
PMV BLD AUTO: 10.4 FL (ref 8.9–12.9)
POTASSIUM SERPL-SCNC: 3.3 MMOL/L (ref 3.5–5.1)
PROT SERPL-MCNC: 8 G/DL (ref 6.4–8.2)
PROT UR STRIP-MCNC: ABNORMAL MG/DL
RBC # BLD AUTO: 4.79 M/UL (ref 3.8–5.2)
RBC #/AREA URNS HPF: >100 /HPF (ref 0–5)
SODIUM SERPL-SCNC: 143 MMOL/L (ref 136–145)
SP GR UR REFRACTOMETRY: 1.01 (ref 1–1.03)
UA: UC IF INDICATED,UAUC: ABNORMAL
UROBILINOGEN UR QL STRIP.AUTO: 0.2 EU/DL (ref 0.2–1)
WBC # BLD AUTO: 6.9 K/UL (ref 3.6–11)
WBC URNS QL MICRO: ABNORMAL /HPF (ref 0–4)

## 2019-05-11 PROCEDURE — 87086 URINE CULTURE/COLONY COUNT: CPT

## 2019-05-11 PROCEDURE — 99282 EMERGENCY DEPT VISIT SF MDM: CPT

## 2019-05-11 PROCEDURE — 85025 COMPLETE CBC W/AUTO DIFF WBC: CPT

## 2019-05-11 PROCEDURE — 80053 COMPREHEN METABOLIC PANEL: CPT

## 2019-05-11 PROCEDURE — 81001 URINALYSIS AUTO W/SCOPE: CPT

## 2019-05-11 PROCEDURE — 74176 CT ABD & PELVIS W/O CONTRAST: CPT

## 2019-05-11 PROCEDURE — 36415 COLL VENOUS BLD VENIPUNCTURE: CPT

## 2019-05-11 RX ORDER — ONDANSETRON 4 MG/1
4 TABLET, ORALLY DISINTEGRATING ORAL
Qty: 10 TAB | Refills: 0 | Status: SHIPPED | OUTPATIENT
Start: 2019-05-11 | End: 2020-03-12

## 2019-05-11 RX ORDER — TAMSULOSIN HYDROCHLORIDE 0.4 MG/1
0.4 CAPSULE ORAL DAILY
Qty: 7 CAP | Refills: 0 | Status: SHIPPED | OUTPATIENT
Start: 2019-05-11 | End: 2019-05-18

## 2019-05-11 RX ORDER — KETOROLAC TROMETHAMINE 10 MG/1
10 TABLET, FILM COATED ORAL
Qty: 10 TAB | Refills: 0 | OUTPATIENT
Start: 2019-05-11 | End: 2019-10-28

## 2019-05-11 NOTE — ED PROVIDER NOTES
EMERGENCY DEPARTMENT HISTORY AND PHYSICAL EXAM  
     
 
Date: 5/11/2019 Patient Name: Anjali Payne History of Presenting Illness Chief Complaint Patient presents with  Blood in Urine  
  ambulatory to triage, states that she started with hematuria today, has had a hysterectomy, denies any other urinary symptoms or fevers  Fatigue History Provided By: Patient HPI: Anjali Payne is a 48 y.o. female, pmhx of HTN, DM, who presents ambulatory to the ED with c/o blood in urine since early yesterday. Patient denies any associated abdominal or urinary pain. Denies nausea or vomiting. Reports having mild lightheaded type symptoms that are waxing and waning. She is without prior history of similar symptoms and denies being on any type of anticoagulation. Pt specifically denies any recent fevers, chills, nausea, vomiting, diarrhea, abd pain, CP, SOB, changes in BM, or headache. PCP: Erna Chung MD 
 
Social Hx: + tobacco, denies EtOH, denies Illicit Drugs There are no other complaints, changes, or physical findings at this time. Allergies Allergen Reactions  Zantac [Ranitidine Hcl] Hives Current Outpatient Medications Medication Sig Dispense Refill  ketorolac (TORADOL) 10 mg tablet Take 1 Tab by mouth every six (6) hours as needed for Pain. 10 Tab 0  
 tamsulosin (FLOMAX) 0.4 mg capsule Take 1 Cap by mouth daily for 7 doses. 7 Cap 0  
 ondansetron (ZOFRAN ODT) 4 mg disintegrating tablet Take 1 Tab by mouth every eight (8) hours as needed for Nausea. 10 Tab 0  
 HYDROcodone-acetaminophen (NORCO) 5-325 mg per tablet Take 1 Tab by mouth every four (4) hours as needed for Pain. Max Daily Amount: 6 Tabs. 8 Tab 0  
 HYDROcodone-acetaminophen (NORCO) 5-325 mg per tablet Take 1 Tab by mouth every four (4) hours as needed for Pain. Max Daily Amount: 6 Tabs.  10 Tab 0  
 HYDROcodone-acetaminophen (NORCO) 5-325 mg per tablet Take 1 Tab by mouth every four (4) hours as needed for Pain. Max Daily Amount: 6 Tabs. 20 Tab 0  
 diclofenac EC (VOLTAREN) 75 mg EC tablet Take 1 Tab by mouth two (2) times a day. Indications: OSTEOARTHRITIS 30 Tab 0  
 TOPIRAMATE (TOPAMAX PO) Take  by mouth.  LIRAGLUTIDE (VICTOZA 2-MARILIN SC) by SubCUTAneous route. 1.8 MG QD    
 metoprolol succinate (TOPROL-XL) 25 mg XL tablet Take 1 Tab by mouth daily. 30 Tab 3  
 lisinopril (PRINIVIL, ZESTRIL) 20 mg tablet   1  
 atorvastatin (LIPITOR) 20 mg tablet  20 mg = 1 tab each dose, PO, bedtime, 0 Refills  metFORMIN ER (GLUCOPHAGE XR) 500 mg tablet   0  
 CONTOUR NEXT STRIPS strip   1  
 alcohol swabs padm   1  
 omeprazole (PRILOSEC) 40 mg capsule Take 1 capsule by mouth two (2) times a day. 60 capsule 2  
 albuterol (PROVENTIL HFA, VENTOLIN HFA) 90 mcg/actuation inhaler Take 2 Puffs by inhalation as needed.  ANSHUL PEN NEEDLE 32 x 5/32 \" ndle daily.  insulin glargine (LANTUS) 100 unit/mL injection 30 Units by SubCUTAneous route nightly. Indications: HYPERGLYCEMIA, TYPE 2 DIABETES MELLITUS  ARIPiprazole (ABILIFY) 10 mg tablet Take 10 mg by mouth daily.  citalopram (CELEXA) 40 mg tablet Take 40 mg by mouth daily. Past History Past Medical History: 
Past Medical History:  
Diagnosis Date  Chronic pelvic pain in female 7/9/2014  Depression  Diabetes ([facility] [address]  Hypertension  Obesity (BMI 35.0-39.9 without comorbidity)  SHAGUFTA on CPAP 3/9/2017  Posttraumatic stress disorder  Psychiatric disorder   
 depression  Recurrent major depression ([facility] [address] 7/27/2010  S/P ventral herniorrhaphy 2/1/2018  Suicidal thoughts  Thyroid nodule 10/1/2015  Unspecified sleep apnea   
 uses cpap Past Surgical History: 
Past Surgical History:  
Procedure Laterality Date  ABDOMEN SURGERY PROC UNLISTED    
 cholecystectomy [address]   HEENT    
 head and neck surgery  HX HERNIA REPAIR  01/18/2018  
 incarcerated ventral hernia repair with mesh  HX HYSTERECTOMY  HX ORTHOPAEDIC    
 L ankle surgery [address] PPBTL Family History: 
Family History Problem Relation Age of Onset Chasity Shukla Mother 61 Breast cancer  Heart Disease Father 50 M. Malu Tang Asthma Sister  Heart Disease Brother  Hypertension Brother  Hypertension Brother  Diabetes Brother  Lung Disease Brother COPD Social History: 
Social History Tobacco Use  Smoking status: Current Every Day Smoker Packs/day: 0.50 Years: 20.00 Pack years: 10.00  Smokeless tobacco: Never Used Substance Use Topics  Alcohol use: No  
 Drug use: No  
 
 
Allergies: Allergies Allergen Reactions  Zantac [Ranitidine Hcl] Hives Review of Systems Review of Systems Constitutional: Negative. Negative for fever. Eyes: Negative. Respiratory: Negative. Negative for shortness of breath. Cardiovascular: Negative for chest pain. Gastrointestinal: Negative for abdominal pain, nausea and vomiting. Endocrine: Negative. Genitourinary: Positive for hematuria. Negative for difficulty urinating and dysuria. Musculoskeletal: Negative. Skin: Negative. Neurological: Negative. Psychiatric/Behavioral: Negative for suicidal ideas. All other systems reviewed and are negative. Physical Exam  
Physical Exam  
Constitutional: She is oriented to person, place, and time. She appears well-developed and well-nourished. No distress. overweight HENT:  
Head: Normocephalic and atraumatic. Nose: Nose normal.  
Eyes: Conjunctivae and EOM are normal. No scleral icterus. Neck: Normal range of motion. No tracheal deviation present. Cardiovascular: Normal rate, regular rhythm, normal heart sounds and intact distal pulses. Exam reveals no friction rub. No murmur heard. Pulmonary/Chest: Effort normal and breath sounds normal. No stridor. No respiratory distress. She has no wheezes. She has no rales. Abdominal: Soft. Bowel sounds are normal. She exhibits no distension. There is no tenderness. There is no rebound. Musculoskeletal: Normal range of motion. She exhibits no tenderness. Neurological: She is alert and oriented to person, place, and time. No cranial nerve deficit. Skin: Skin is warm and dry. No rash noted. She is not diaphoretic. Psychiatric: She has a normal mood and affect. Her speech is normal and behavior is normal. Judgment and thought content normal. Cognition and memory are normal.  
Nursing note and vitals reviewed. Diagnostic Study Results Labs - Recent Results (from the past 12 hour(s)) URINALYSIS W/ REFLEX CULTURE Collection Time: 05/11/19 12:55 AM  
Result Value Ref Range Color YELLOW/STRAW Appearance CLOUDY (A) CLEAR Specific gravity 1.013 1.003 - 1.030    
 pH (UA) 5.5 5.0 - 8.0 Protein TRACE (A) NEG mg/dL Glucose NEGATIVE  NEG mg/dL Ketone NEGATIVE  NEG mg/dL Bilirubin NEGATIVE  NEG Blood LARGE (A) NEG Urobilinogen 0.2 0.2 - 1.0 EU/dL Nitrites NEGATIVE  NEG Leukocyte Esterase TRACE (A) NEG    
 WBC 5-10 0 - 4 /hpf  
 RBC >100 (H) 0 - 5 /hpf Epithelial cells FEW FEW /lpf Bacteria NEGATIVE  NEG /hpf  
 UA:UC IF INDICATED URINE CULTURE ORDERED (A) CNI Hyaline cast 2-5 0 - 5 /lpf  
CBC WITH AUTOMATED DIFF Collection Time: 05/11/19  1:18 AM  
Result Value Ref Range WBC 6.9 3.6 - 11.0 K/uL  
 RBC 4.79 3.80 - 5.20 M/uL  
 HGB 11.9 11.5 - 16.0 g/dL HCT 37.1 35.0 - 47.0 % MCV 77.5 (L) 80.0 - 99.0 FL  
 MCH 24.8 (L) 26.0 - 34.0 PG  
 MCHC 32.1 30.0 - 36.5 g/dL  
 RDW 18.0 (H) 11.5 - 14.5 % PLATELET 405 441 - 569 K/uL MPV 10.4 8.9 - 12.9 FL  
 NRBC 0.0 0  WBC ABSOLUTE NRBC 0.00 0.00 - 0.01 K/uL NEUTROPHILS 55 32 - 75 % LYMPHOCYTES 39 12 - 49 % MONOCYTES 5 5 - 13 % EOSINOPHILS 1 0 - 7 % BASOPHILS 0 0 - 1 % IMMATURE GRANULOCYTES 0 0.0 - 0.5 % ABS. NEUTROPHILS 3.8 1.8 - 8.0 K/UL  
 ABS. LYMPHOCYTES 2.7 0.8 - 3.5 K/UL  
 ABS. MONOCYTES 0.4 0.0 - 1.0 K/UL  
 ABS. EOSINOPHILS 0.1 0.0 - 0.4 K/UL  
 ABS. BASOPHILS 0.0 0.0 - 0.1 K/UL  
 ABS. IMM. GRANS. 0.0 0.00 - 0.04 K/UL  
 DF AUTOMATED METABOLIC PANEL, COMPREHENSIVE Collection Time: 05/11/19  1:18 AM  
Result Value Ref Range Sodium 143 136 - 145 mmol/L Potassium 3.3 (L) 3.5 - 5.1 mmol/L Chloride 109 (H) 97 - 108 mmol/L  
 CO2 25 21 - 32 mmol/L Anion gap 9 5 - 15 mmol/L Glucose 118 (H) 65 - 100 mg/dL BUN 10 6 - 20 MG/DL Creatinine 0.97 0.55 - 1.02 MG/DL  
 BUN/Creatinine ratio 10 (L) 12 - 20 GFR est AA >60 >60 ml/min/1.73m2 GFR est non-AA >60 >60 ml/min/1.73m2 Calcium 9.0 8.5 - 10.1 MG/DL Bilirubin, total 0.3 0.2 - 1.0 MG/DL  
 ALT (SGPT) 17 12 - 78 U/L  
 AST (SGOT) 16 15 - 37 U/L Alk. phosphatase 96 45 - 117 U/L Protein, total 8.0 6.4 - 8.2 g/dL Albumin 3.9 3.5 - 5.0 g/dL Globulin 4.1 (H) 2.0 - 4.0 g/dL A-G Ratio 1.0 (L) 1.1 - 2.2 Radiologic Studies -  
CT ABD PELV WO CONT Final Result IMPRESSION:  
4 mm calculus at the right ureteropelvic junction produces no hydronephrosis. Punctate bilateral nonobstructing renal calculi. CT Results  (Last 48 hours) 05/11/19 0323  CT ABD PELV WO CONT Final result Impression:  IMPRESSION:  
4 mm calculus at the right ureteropelvic junction produces no hydronephrosis. Punctate bilateral nonobstructing renal calculi. Narrative:  EXAM: CT ABD PELV WO CONT INDICATION: hematuria COMPARISON: November 6, 2014 CONTRAST:  None. TECHNIQUE:   
Thin axial images were obtained through the abdomen and pelvis. Coronal and  
sagittal reconstructions were generated. Oral contrast was not administered.  CT  
 dose reduction was achieved through use of a standardized protocol tailored for  
this examination and automatic exposure control for dose modulation. The absence of intravenous contrast material reduces the sensitivity for  
evaluation of the solid parenchymal organs of the abdomen. FINDINGS:   
LUNG BASES: Clear. INCIDENTALLY IMAGED HEART AND MEDIASTINUM: Unremarkable. LIVER: No mass or biliary dilatation. GALLBLADDER: Surgically absent. SPLEEN: No mass. PANCREAS: No mass or ductal dilatation. ADRENALS: Unremarkable. KIDNEYS/URETERS: 4 mm calculus at the right ureteropelvic junction, without  
associated hydronephrosis. Punctate bilateral nonobstructing renal calculi. STOMACH: Unremarkable. SMALL BOWEL: No dilatation or wall thickening. COLON: No dilatation or wall thickening. APPENDIX: Normal  
PERITONEUM: No ascites or pneumoperitoneum. RETROPERITONEUM: No lymphadenopathy or aortic aneurysm. REPRODUCTIVE ORGANS: Status post hysterectomy. URINARY BLADDER: No mass or calculus. BONES: No destructive bone lesion. Left hip prosthesis. ADDITIONAL COMMENTS: N/A  
   
  
  
 
CXR Results  (Last 48 hours) None Medical Decision Making I am the first provider for this patient. I reviewed the vital signs, available nursing notes, past medical history, past surgical history, family history and social history. Vital Signs-Reviewed the patient's vital signs. Patient Vitals for the past 12 hrs: 
 Temp Pulse Resp BP SpO2  
05/11/19 0046 98.3 °F (36.8 °C) 66 16 (!) 171/115 100 % Pulse Oximetry Analysis - 100% on RA Records Reviewed: Nursing Notes and Old Medical Records Provider Notes (Medical Decision Making): DDX: 
UTI, ureteral stone, bladder mass, anticoagulation, anemia Plan: 
Labs, UA, CT scan Impression: 
Gross hematuria ED Course:  
Initial assessment performed.  The patients presenting problems have been discussed, and they are in agreement with the care plan formulated and outlined with them. I have encouraged them to ask questions as they arise throughout their visit. I reviewed our electronic medical record system for any past medical records that were available that may contribute to the patients current condition, the nursing notes and and vital signs from today's visit Nursing notes will be reviewed as they become available in realtime while the pt has been in the ED. Sarah Zavala MD 
 
 
 
 
TOBACCO COUNSELING: 
During evaluation pt reported that they are a current tobacco user. I have spent 3 minutes discussing the medical risks of prolonged smoking habits and advised the patient of the benefits of the cessation of smoking, providing specific suggestions on how to quit. Pt has been counseled and encouraged to quit as soon as possible in order to decrease further risks to their health. Pt has conveyed their understanding of the risks involved should they continue to use tobacco products. Sarah Zavala MD 
 
I personally reviewed pt's imaging. Official read by radiology noted above. Sarah Zavala MD 
 
 
  
 
 
 
4:05 AM 
Progress note: 
Pt noted to be feeling better, ready for discharge. Discussed lab and imaging findings with pt, specifically noting 4mm UPJ stone. Pt will follow up with [facility] as instructed. All questions have been answered, pt voiced understanding and agreement with plan. If narcotics were prescribed, pt's  record was reviewed and pt was advised not to drive or operate heavy machinery. If abx were prescribed, pt advised that diarrhea and rash are possible side effects of the medications. Specific return precautions provided in addition to instructions for pt to return to the ED immediately should sx worsen at any time. Sarah Zavala MD 
 
 
Critical Care Time:  
 
none Diagnosis Clinical Impression: 1. Right ureteral stone 2. Gross hematuria PLAN: 
1. Current Discharge Medication List  
  
START taking these medications Details  
ketorolac (TORADOL) 10 mg tablet Take 1 Tab by mouth every six (6) hours as needed for Pain. Qty: 10 Tab, Refills: 0  
  
tamsulosin (FLOMAX) 0.4 mg capsule Take 1 Cap by mouth daily for 7 doses. Qty: 7 Cap, Refills: 0  
  
ondansetron (ZOFRAN ODT) 4 mg disintegrating tablet Take 1 Tab by mouth every eight (8) hours as needed for Nausea. Qty: 10 Tab, Refills: 0  
  
  
 
2. Follow-up Information Follow up With Specialties Details Why Contact Info Thom Finn MD Family Practice Schedule an appointment as soon as possible for a visit in 2 days  [address] 
[phone] [facility]  Schedule an appointment as soon as possible for a visit Kidney stone hotline- [phone], As needed [address] [address] [address]   [phone] Return to ED if worse Disposition: 
4:05 AM  
The patient's results have been reviewed with family and/or caregiver. They verbally convey their understanding and agreement of the patient's signs, symptoms, diagnosis, treatment and prognosis and additionally agree to follow up as recommended in the discharge instructions or to return to the Emergency Room should the patient's condition change prior to their follow-up appointment. The family and/or caregiver verbally agrees with the care-plan and all of their questions have been answered. The discharge instructions have also been provided to the them with educational information regarding the patient's diagnosis as well a list of reasons why the patient would want to return to the ER prior to their follow-up appointment should their condition change. Javier Akers MD 
 
 
 
 
 
Please note that this dictation was completed with [employer], the [employer] voice recognition software.   Quite often unanticipated grammatical, syntax, homophones, and other interpretive errors are inadvertently transcribed by the computer software. Please disregard these errors. Please excuse any errors that have escaped final proofreading. This note will not be viewable in [address]

## 2019-05-11 NOTE — DISCHARGE INSTRUCTIONS
Kidney Stone: Care Instructions  Your Care Instructions    Kidney stones are formed when salts, minerals, and other substances normally found in the urine clump together. They can be as small as grains of sand or, rarely, as large as golf balls. While the stone is traveling through the ureter, which is the tube that carries urine from the kidney to the bladder, you will probably feel pain. The pain may be mild or very severe. You may also have some blood in your urine. As soon as the stone reaches the bladder, any intense pain should go away. If a stone is too large to pass on its own, you may need a medical procedure to help you pass the stone. The doctor has checked you carefully, but problems can develop later. If you notice any problems or new symptoms, get medical treatment right away. Follow-up care is a key part of your treatment and safety. Be sure to make and go to all appointments, and call your doctor if you are having problems. It's also a good idea to know your test results and keep a list of the medicines you take. How can you care for yourself at home? · Drink plenty of fluids, enough so that your urine is light yellow or clear like water. If you have kidney, heart, or liver disease and have to limit fluids, talk with your doctor before you increase the amount of fluids you drink. · Take pain medicines exactly as directed. Call your doctor if you think you are having a problem with your medicine. ? If the doctor gave you a prescription medicine for pain, take it as prescribed. ? If you are not taking a prescription pain medicine, ask your doctor if you can take an over-the-counter medicine. Read and follow all instructions on the label. · Your doctor may ask you to strain your urine so that you can collect your kidney stone when it passes. You can use a kitchen strainer or a tea strainer to catch the stone. Store it in a plastic bag until you see your doctor again.   Preventing future kidney stones  Some changes in your diet may help prevent kidney stones. Depending on the cause of your stones, your doctor may recommend that you:  · Drink plenty of fluids, enough so that your urine is light yellow or clear like water. If you have kidney, heart, or liver disease and have to limit fluids, talk with your doctor before you increase the amount of fluids you drink. · Limit coffee, tea, and alcohol. Also avoid grapefruit juice. · Do not take more than the recommended daily dose of vitamins C and D.  · Avoid antacids such as Gaviscon, Maalox, Mylanta, or Tums. · Limit the amount of salt (sodium) in your diet. · Eat a balanced diet that is not too high in protein. · Limit foods that are high in a substance called oxalate, which can cause kidney stones. These foods include dark green vegetables, rhubarb, chocolate, wheat bran, nuts, cranberries, and beans. When should you call for help? Call your doctor now or seek immediate medical care if:    · You cannot keep down fluids.     · Your pain gets worse.     · You have a fever or chills.     · You have new or worse pain in your back just below your rib cage (the flank area).     · You have new or more blood in your urine.    Watch closely for changes in your health, and be sure to contact your doctor if:    · You do not get better as expected. Where can you learn more? Go to http://tin-viv.info/. Enter E852 in the search box to learn more about \"Kidney Stone: Care Instructions. \"  Current as of: March 14, 2018  Content Version: 11.9  © 9302-8772 [employer]. Care instructions adapted under license by [employer] (which disclaims liability or warranty for this information). If you have questions about a medical condition or this instruction, always ask your healthcare professional. [address] any warranty or liability for your use of this information.     Patient Education Blood in the Urine: Care Instructions  Your Care Instructions    Blood in the urine, or hematuria, may make the urine look red, brown, or pink. There may be blood every time you urinate or just from time to time. You cannot always see blood in the urine, but it will show up in a urine test.  Blood in the urine may be serious. It should always be checked by a doctor. Your doctor may recommend more tests, including an X-ray, a CT scan, or a cystoscopy (which lets a doctor look inside the urethra and bladder). Blood in the urine can be a sign of another problem. Common causes are bladder infections and kidney stones. An injury to your groin or your genital area can also cause bleeding in the urinary tract. Very hard exercise--such as running a marathon--can cause blood in the urine. Blood in the urine can also be a sign of kidney disease or cancer in the bladder or kidney. Many cases of blood in the urine are caused by a harmless condition that runs in families. This is called benign familial hematuria. It does not need any treatment. Sometimes your urine may look red or brown even though it does not contain blood. For example, not getting enough fluids (dehydration), taking certain medicines, or having a liver problem can change the color of your urine. Eating foods such as beets, rhubarb, or blackberries or foods with red food coloring can make your urine look red or pink. Follow-up care is a key part of your treatment and safety. Be sure to make and go to all appointments, and call your doctor if you are having problems. It's also a good idea to know your test results and keep a list of the medicines you take. When should you call for help? Call your doctor now or seek immediate medical care if:    · You have symptoms of a urinary infection. For example:  ? You have pus in your urine. ? You have pain in your back just below your rib cage. This is called flank pain. ?  You have a fever, chills, or body aches.  ? It hurts to urinate. ? You have groin or belly pain.     · You have more blood in your urine.    Watch closely for changes in your health, and be sure to contact your doctor if:    · You have new urination problems.     · You do not get better as expected. Where can you learn more? Go to http://tin-viv.info/. Enter M719 in the search box to learn more about \"Blood in the Urine: Care Instructions. \"  Current as of: March 20, 2018  Content Version: 11.9  © 2783-1608 [employer]. Care instructions adapted under license by [employer] (which disclaims liability or warranty for this information). If you have questions about a medical condition or this instruction, always ask your healthcare professional. [address] any warranty or liability for your use of this information.

## 2019-05-12 LAB
BACTERIA SPEC CULT: NORMAL
CC UR VC: NORMAL
SERVICE CMNT-IMP: NORMAL

## 2019-05-19 ENCOUNTER — HOSPITAL ENCOUNTER (EMERGENCY)
Age: 54
Discharge: HOME | End: 2019-05-19
Attending: EMERGENCY MEDICINE
Payer: MEDICARE

## 2019-05-19 ENCOUNTER — APPOINTMENT (OUTPATIENT)
Dept: GENERAL RADIOLOGY | Age: 54
End: 2019-05-19
Attending: PHYSICIAN ASSISTANT
Payer: MEDICARE

## 2019-05-19 VITALS
HEART RATE: 80 BPM | HEIGHT: 60 IN | WEIGHT: 217.59 LBS | SYSTOLIC BLOOD PRESSURE: 146 MMHG | OXYGEN SATURATION: 100 % | TEMPERATURE: 97.8 F | DIASTOLIC BLOOD PRESSURE: 95 MMHG | RESPIRATION RATE: 14 BRPM | BODY MASS INDEX: 42.72 KG/M2

## 2019-05-19 DIAGNOSIS — M43.6 ACUTE TORTICOLLIS: Primary | ICD-10-CM

## 2019-05-19 DIAGNOSIS — F43.0 ACUTE STRESS REACTION: ICD-10-CM

## 2019-05-19 PROCEDURE — 75810000123 HC INJ'S ANES/STEROID AGT PERIPH NERVE

## 2019-05-19 PROCEDURE — 74011250637 HC RX REV CODE- 250/637: Performed by: PHYSICIAN ASSISTANT

## 2019-05-19 PROCEDURE — 74011000250 HC RX REV CODE- 250: Performed by: PHYSICIAN ASSISTANT

## 2019-05-19 PROCEDURE — 99283 EMERGENCY DEPT VISIT LOW MDM: CPT

## 2019-05-19 PROCEDURE — 72050 X-RAY EXAM NECK SPINE 4/5VWS: CPT

## 2019-05-19 RX ORDER — CYCLOBENZAPRINE HCL 10 MG
10 TABLET ORAL
Qty: 20 TAB | Refills: 0 | Status: SHIPPED | OUTPATIENT
Start: 2019-05-19 | End: 2019-05-19

## 2019-05-19 RX ORDER — BUPIVACAINE HYDROCHLORIDE 5 MG/ML
1 INJECTION, SOLUTION EPIDURAL; INTRACAUDAL
Status: COMPLETED | OUTPATIENT
Start: 2019-05-19 | End: 2019-05-19

## 2019-05-19 RX ORDER — HYDROCODONE BITARTRATE AND ACETAMINOPHEN 5; 325 MG/1; MG/1
1 TABLET ORAL
Status: COMPLETED | OUTPATIENT
Start: 2019-05-19 | End: 2019-05-19

## 2019-05-19 RX ORDER — CYCLOBENZAPRINE HCL 10 MG
10 TABLET ORAL
Status: COMPLETED | OUTPATIENT
Start: 2019-05-19 | End: 2019-05-19

## 2019-05-19 RX ORDER — CYCLOBENZAPRINE HCL 10 MG
10 TABLET ORAL
Qty: 20 TAB | Refills: 0 | Status: SHIPPED | OUTPATIENT
Start: 2019-05-19 | End: 2019-10-28

## 2019-05-19 RX ORDER — NAPROXEN 250 MG/1
500 TABLET ORAL
Status: COMPLETED | OUTPATIENT
Start: 2019-05-19 | End: 2019-05-19

## 2019-05-19 RX ORDER — HYDROCODONE BITARTRATE AND ACETAMINOPHEN 5; 325 MG/1; MG/1
1 TABLET ORAL
Qty: 10 TAB | Refills: 0 | Status: SHIPPED | OUTPATIENT
Start: 2019-05-19 | End: 2019-05-22

## 2019-05-19 RX ADMIN — BUPIVACAINE HYDROCHLORIDE 5 MG: 5 INJECTION, SOLUTION EPIDURAL; INTRACAUDAL; PERINEURAL at 20:44

## 2019-05-19 RX ADMIN — CYCLOBENZAPRINE HYDROCHLORIDE 10 MG: 10 TABLET, FILM COATED ORAL at 19:54

## 2019-05-19 RX ADMIN — NAPROXEN 500 MG: 250 TABLET ORAL at 19:54

## 2019-05-19 RX ADMIN — HYDROCODONE BITARTRATE AND ACETAMINOPHEN 1 TABLET: 5; 325 TABLET ORAL at 19:54

## 2019-05-20 NOTE — DISCHARGE INSTRUCTIONS
Patient Education        Work-Life Balance: Care Instructions  Your Care Instructions    Do you ever feel like there is not enough time to do all of the things you have to do, and no time at all for the things you enjoy? If so, you are not alone. On average, people in the [place] have worked more and more hours each year since 1970. But in recent years, fewer people say they want to take on more at work, even if they would get promoted or get paid more money. More and more workers say they want time to spend with their families and to do things that are important to them. Do you ever feel:  · That you always have more and more work to do at your job? · That too many people depend on you every day? · That you never have enough time for your family or friends? · That you never have time for hobbies or things you enjoy? · That each second of your day is scheduled? If you answered \"yes\" to any of these questions, take steps at work and at home to get your life into balance. Follow-up care is a key part of your treatment and safety. Be sure to make and go to all appointments, and call your doctor if you are having problems. How can you care for yourself at home? Manage your time  · Focus on the important things. Taking on too much can wear you out. Look at how you spend your time, and redirect your focus. Learn to say \"no\" and let go of things that do not matter. · Set one small goal at a time. Use a day planner. Break large projects into smaller ones. · Ask for help. Let your children, your spouse, your coworkers, and other people in your life help you get things done. · Leave your job at the office. If you give up free time to get more work done, you may pay for it with stress. If your job offers a flexible work schedule, use it to fit your own work style. For instance, come in earlier to have a longer lunch break, or make time for a yoga class or workout during your workday. · Unplug.  Do not let technology (such as your cell phone or the Internet) erase the line between your time and your employer's time. Lower job stress  Job stress causes trouble at work and at home. At work, you may worry about things you have not had time to do at home. At home, you may worry about your job. This cycle upsets your work-life balance. Lowering your job stress can get your life back in balance. Job stress can be caused by:  · Pressure and deadlines. · Heavy workloads or long hours. · Not being allowed to make decisions. · Health and safety hazards. · Feeling you may lose your job. · Unclear or changing job duties. · Too much responsibility. · Work that is very tiring or boring. Do any of these things bother you? Consider talking with your boss to change things. There are some things that you may not be able to control. But even a few small changes might help lower your stress. Take advantage of programs at work  Businesses make money and are better off in other ways if their employees are healthy and happy. For this reason, many companies have programs to help balance work life and home life. These programs may include:  · Flexible schedules and hours. · Time off for family reasons, education, or community service. · Being able to work from home. · Employee assistance programs to provide counseling. · Child-care programs. Check to see if your company has any of these or other programs that could help you. If not, consider talking to your boss about why work-life balance programs make good business sense. Even if your company does not start an official program, you may be able to get flexible hours, time off, or the ability to do some work from home. Know when to quit  If you are truly unhappy because of a stressful job, and if the suggestions here have not worked, it may be time to think about changing jobs or changing careers. But before you quit, take time to research your options.   Where can you learn more?  Go to http://tin-viv.info/. Enter A303 in the search box to learn more about \"Work-Life Balance: Care Instructions. \"  Current as of: June 28, 2018  Content Version: 11.9  © 3096-7619 [email or web address]. Care instructions adapted under license by [employer] (which disclaims liability or warranty for this information). If you have questions about a medical condition or this instruction, always ask your healthcare professional. [address] any warranty or liability for your use of this information. Patient Education        Learning About Stress  What is stress? Stress is what you feel when you have to handle more than you are used to. Stress is a fact of life for most people, and it affects everyone differently. What causes stress for you may not be stressful for someone else. A lot of things can cause stress. You may feel stress when you go on a job interview, take a test, or run a race. This kind of short-term stress is normal and even useful. It can help you if you need to work hard or react quickly. For example, stress can help you finish an important job on time. Stress also can last a long time. Long-term stress is caused by stressful situations or events. Examples of long-term stress include long-term health problems, ongoing problems at work, or conflicts in your family. Long-term stress can harm your health. How does stress affect your health? When you are stressed, your body responds as though you are in danger. It makes hormones that speed up your heart, make you breathe faster, and give you a burst of energy. This is called the fight-or-flight stress response. If the stress is over quickly, your body goes back to normal and no harm is done. But if stress happens too often or lasts too long, it can have bad effects. Long-term stress can make you more likely to get sick, and it can make symptoms of some diseases worse.  If you tense up when you are stressed, you may develop neck, shoulder, or low back pain. Stress is linked to high blood pressure and heart disease. Stress also harms your emotional health. It can make you santana, tense, or depressed. Your relationships may suffer, and you may not do well at work or school. What can you do to manage stress? How to relax your mind  · Write. It may help to write about things that are bothering you. This helps you find out how much stress you feel and what is causing it. When you know this, you can find better ways to cope. · Let your feelings out. Talk, laugh, cry, and express anger when you need to. Talking with friends, family, a counselor, or a member of the clergy about your feelings is a healthy way to relieve stress. · Do something you enjoy. For example, listen to music or go to a movie. Practice your hobby or do volunteer work. · Meditate. This can help you relax, because you are not worrying about what happened before or what may happen in the future. · Do guided imagery. Imagine yourself in any setting that helps you feel calm. You can use audiotapes, books, or a teacher to guide you. How to relax your body  · Do something active. Exercise or activity can help reduce stress. Walking is a great way to get started. Even everyday activities such as housecleaning or yard work can help. · Do breathing exercises. For example:  ? From a standing position, bend forward from the waist with your knees slightly bent. Let your arms dangle close to the floor. ? Breathe in slowly and deeply as you return to a standing position. Roll up slowly and lift your head last.  ? Hold your breath for just a few seconds in the standing position. ? Breathe out slowly and bend forward from the waist.  · Try yoga or gabriel chi. These techniques combine exercise and meditation. You may need some training at first to learn them. What can you do to prevent stress? · Manage your time.  This helps you find time to do the things you want and need to do. · Get enough sleep. Your body recovers from the stresses of the day while you are sleeping. · Get support. Your family, friends, and community can make a difference in how you experience stress. Where can you learn more? Go to http://tin-viv.info/. Enter T397 in the search box to learn more about \"Learning About Stress. \"  Current as of: June 28, 2018  Content Version: 11.9  © 4330-6845 [employer]. Care instructions adapted under license by [employer] (which disclaims liability or warranty for this information). If you have questions about a medical condition or this instruction, always ask your healthcare professional. [address] any warranty or liability for your use of this information. Patient Education        Learning About Progressive Muscle Relaxation for Stress  What are progressive muscle relaxation and stress? Stress is what you feel when you have to handle more than you are used to. A lot of things can cause stress. You may feel stress when you go on a job interview, take a test, or run a race. This kind of short-term stress is normal and even useful. It can help you if you need to work hard or react quickly. Stress also can last a long time. Long-term stress is caused by stressful situations or events. Examples of long-term stress include long-term health problems, ongoing problems at work, and conflicts in your family. Long-term stress can harm your health. When you have anxiety or stress in your life, one of the ways your body responds is with muscle tension. Progressive muscle relaxation is a method that helps relieve that tension. How does progressive muscle relaxation reduce stress? The body responds to stress with muscle tension, which can cause pain or discomfort. In turn, tense muscles relay to the body that it's stressed.  That keeps the cycle of stress and muscle tension going. Progressive muscle relaxation helps break this cycle by reducing muscle tension and general mental anxiety. This method often helps people get to sleep. How do you do progressive muscle relaxation? To do progressive muscle relaxation, first you tense a group of muscles as you breathe in. Then you relax them as you breathe out. Notice how your muscles feel when you relax them. You work on your muscle groups in a certain order. Through practice, you can learn to feel the difference between a tensed muscle and a relaxed muscle. Then you can learn how to turn on this relaxed state at the first sign of a muscle tensing up due to stress. Practicing this method for a few weeks will help you get better at this skill. In time you'll be able to use this method to relieve stress. When you first start, it may help to use an audio recording until you learn all the muscle groups in order. Check online for these recordings. Choose a place where you won't be interrupted. It should have space for you to lie down on your back and stretch out comfortably, such as on a carpeted floor. Follow-up care is a key part of your treatment and safety. Be sure to make and go to all appointments, and call your doctor if you are having problems. It's also a good idea to know your test results and keep a list of the medicines you take. Where can you learn more? Go to http://tin-viv.info/. Enter K966 in the search box to learn more about \"Learning About Progressive Muscle Relaxation for Stress. \"  Current as of: June 28, 2018  Content Version: 11.9  © 6115-7118 [employer]. Care instructions adapted under license by [employer] (which disclaims liability or warranty for this information).  If you have questions about a medical condition or this instruction, always ask your healthcare professional. [place] [address] any warranty or liability for your use of this information. Patient Education        Torticollis: Care Instructions  Your Care Instructions  Torticollis is a severe tightness of the muscles on one side of the neck. The tight muscles can make the head turn to one side, lean to one side, or be pulled forward or backward. It is also called wryneck. Your doctor asked questions about your health and examined you. You may also have had X-rays or other tests. If your doctor thinks another medical problem is causing your tight neck muscles, you may need more tests. Torticollis usually gets better with home care. Your doctor may have you take medicine to relieve pain or relax your muscles. He or she may suggest exercise and physical therapy to help increase flexibility and relieve stress. Your doctor may also have you wear a special collar, called a cervical collar, for a day or two. The collar may help make your neck more comfortable. Follow-up care is a key part of your treatment and safety. Be sure to make and go to all appointments, and call your doctor if you are having problems. It's also a good idea to know your test results and keep a list of the medicines you take. How can you care for yourself at home? · Be safe with medicines. Read and follow all instructions on the label. ? If the doctor gave you a prescription medicine for pain, take it as prescribed. ? If you are not taking a prescription pain medicine, ask your doctor if you can take an over-the-counter medicine. · Try using a heating pad on a low or medium setting for 15 to 20 minutes every 2 or 3 hours. Try a warm shower in place of one session with the heating pad. · Try using an ice pack for 10 to 15 minutes every 2 to 3 hours. Put a thin cloth between the ice and your skin. · If your doctor recommends a cervical collar, wear it exactly as directed. When should you call for help?   Call your doctor now or seek immediate medical care if:    · You have new or worse numbness in your arms, buttocks, or legs.     · You have new or worse weakness in your arms or legs.     · Your neck pain gets worse.     · You lose bladder or bowel control.    Watch closely for changes in your health, and be sure to contact your doctor if:    · You do not get better as expected. Where can you learn more? Go to http://tin-viv.info/. Enter S916 in the search box to learn more about \"Torticollis: Care Instructions. \"  Current as of: September 20, 2018  Content Version: 11.9  © 1338-2837 "[employer] Care instructions adapted under license by [employer] (which disclaims liability or warranty for this information). If you have questions about a medical condition or this instruction, always ask your healthcare professional. [address] any warranty or liability for your use of this information.

## 2019-05-20 NOTE — ED NOTES
Patient discharged by [facility]. Patient ambulated with steady gait in NAD to waiting room to meet ride.

## 2019-05-20 NOTE — ED PROVIDER NOTES
EMERGENCY DEPARTMENT HISTORY AND PHYSICAL EXAM          Date: 5/19/2019  Patient Name: Ameena Sanchez    History of Presenting Illness     Chief Complaint   Patient presents with    Headache     Ambulatory into the ED with c/o pain in the back of her neck radiating into the top of her head x 1 week. Pain is worse with movement of head and upper extremities. She does not recall an injury.  Neck Pain       History Provided By: Patient    HPI: Ameena Sanchez is a 48 y.o. female, pmhx diabetes, hypertension, who presents ambulatory to the ED c/o   spasm and tightness in her right shoulder and neck. Patient states that she is been under a lot of stress recently patient has not been able sleep very well. Patient is tried Tylenol with little relief. The pain does not radiate. She specifically denies any recent fevers, chills, nausea, vomiting, diarrhea, abd pain, CP, urinary sxs, changes in BM, or headache. PCP: Deonte Douglas MD    There are no other complaints, changes, or physical findings at this time. Current Outpatient Medications   Medication Sig Dispense Refill    HYDROcodone-acetaminophen (NORCO) 5-325 mg per tablet Take 1 Tab by mouth every six (6) hours as needed for Pain for up to 3 days. Max Daily Amount: 4 Tabs. Indications: Pain 10 Tab 0    cyclobenzaprine (FLEXERIL) 10 mg tablet Take 1 Tab by mouth three (3) times daily as needed for Muscle Spasm(s). Indications: muscle spasm 20 Tab 0    ketorolac (TORADOL) 10 mg tablet Take 1 Tab by mouth every six (6) hours as needed for Pain. 10 Tab 0    ondansetron (ZOFRAN ODT) 4 mg disintegrating tablet Take 1 Tab by mouth every eight (8) hours as needed for Nausea. 10 Tab 0    TOPIRAMATE (TOPAMAX PO) Take  by mouth.  LIRAGLUTIDE (VICTOZA 2-MARILIN SC) by SubCUTAneous route. 1.8 MG QD      metoprolol succinate (TOPROL-XL) 25 mg XL tablet Take 1 Tab by mouth daily.  30 Tab 3    lisinopril (PRINIVIL, ZESTRIL) 20 mg tablet   1    atorvastatin (LIPITOR) 20 mg tablet  20 mg = 1 tab each dose, PO, bedtime, 0 Refills      metFORMIN ER (GLUCOPHAGE XR) 500 mg tablet   0    CONTOUR NEXT STRIPS strip   1    alcohol swabs padm   1    omeprazole (PRILOSEC) 40 mg capsule Take 1 capsule by mouth two (2) times a day. 60 capsule 2    albuterol (PROVENTIL HFA, VENTOLIN HFA) 90 mcg/actuation inhaler Take 2 Puffs by inhalation as needed.  ANSHUL PEN NEEDLE 32 x 5/32 \" ndle daily.  insulin glargine (LANTUS) 100 unit/mL injection 30 Units by SubCUTAneous route nightly. Indications: HYPERGLYCEMIA, TYPE 2 DIABETES MELLITUS      ARIPiprazole (ABILIFY) 10 mg tablet Take 10 mg by mouth daily.  citalopram (CELEXA) 40 mg tablet Take 40 mg by mouth daily. Past History     Past Medical History:  Past Medical History:   Diagnosis Date    Chronic pelvic pain in female 2014    Depression     Diabetes [facility] [address]     Hypertension     Obesity (BMI 35.0-39.9 without comorbidity)     SHAGUFTA on CPAP 3/9/2017    Posttraumatic stress disorder     Psychiatric disorder     depression    Recurrent major depression ([facility] [address] 2010    S/P ventral herniorrhaphy 2018    Suicidal thoughts     Thyroid nodule 10/1/2015    Unspecified sleep apnea     uses cpap       Past Surgical History:  Past Surgical History:   Procedure Laterality Date    ABDOMEN SURGERY PROC UNLISTED      cholecystectomy    HX  SECTION      HX  SECTION      HX HEENT      head and neck surgery     HX HERNIA REPAIR  2018    incarcerated ventral hernia repair with mesh    HX HYSTERECTOMY      HX ORTHOPAEDIC      L ankle surgery     HX TUBAL LIGATION  1998    PPBTL       Family History:  Family History   Problem Relation Age of Onset    Cancer Mother 61        Breast cancer    Heart Disease Father 50        M. Sarah Polo Asthma Sister     Heart Disease Brother     Hypertension Brother     Hypertension Brother     Diabetes Brother     Lung Disease Brother         COPD       Social History:  Social History     Tobacco Use    Smoking status: Current Every Day Smoker     Packs/day: 0.50     Years: 20.00     Pack years: 10.00    Smokeless tobacco: Never Used   Substance Use Topics    Alcohol use: No    Drug use: No       Allergies: Allergies   Allergen Reactions    Zantac [Ranitidine Hcl] Hives         Review of Systems   Review of Systems   Constitutional: Negative for activity change, appetite change, fatigue and fever. HENT: Negative for congestion, dental problem, ear pain, rhinorrhea and sinus pressure. Eyes: Negative for photophobia, pain, discharge, redness, itching and visual disturbance. Respiratory: Negative for cough, chest tightness, shortness of breath, wheezing and stridor. Cardiovascular: Negative for chest pain and leg swelling. Gastrointestinal: Negative for abdominal distention, abdominal pain and blood in stool. Genitourinary: Negative for difficulty urinating, dysuria, flank pain, frequency, vaginal bleeding, vaginal discharge and vaginal pain. Musculoskeletal: Positive for myalgias, neck pain and neck stiffness. Negative for arthralgias, back pain, gait problem and joint swelling. Skin: Negative for rash and wound. Neurological: Negative for dizziness, syncope, weakness, numbness and headaches. Psychiatric/Behavioral: Negative for behavioral problems. The patient is not nervous/anxious. Physical Exam   Physical Exam   Constitutional: She is oriented to person, place, and time. She appears well-developed and well-nourished. No distress. Ambulatory overweight   HENT:   Head: Normocephalic and atraumatic. Right Ear: External ear normal.   Left Ear: External ear normal.   Nose: Nose normal.   Mouth/Throat: Oropharynx is clear and moist. No oropharyngeal exudate. Eyes: Pupils are equal, round, and reactive to light. Conjunctivae and EOM are normal. Right eye exhibits no discharge.  Left eye exhibits no discharge. No scleral icterus. Neck: Neck supple. Muscular tenderness present. No spinous process tenderness present. No neck rigidity. Decreased range of motion present. No tracheal deviation and no erythema present. Cardiovascular: Normal rate, regular rhythm, normal heart sounds and intact distal pulses. Exam reveals no gallop and no friction rub. No murmur heard. Pulmonary/Chest: Effort normal and breath sounds normal. No respiratory distress. She has no wheezes. She has no rales. She exhibits no tenderness. Abdominal: Soft. Bowel sounds are normal. She exhibits no distension and no mass. There is no tenderness. There is no rebound and no guarding. Musculoskeletal: She exhibits no edema or tenderness. Cervical back: She exhibits pain and spasm. She exhibits no bony tenderness and no deformity. Has been tenderness the right trapezius. AIN/PIN/ulnar/radial motor nerves distal right upper extremity. Lymphadenopathy:     She has no cervical adenopathy. Neurological: She is alert and oriented to person, place, and time. No cranial nerve deficit. Skin: Skin is warm and dry. No rash noted. No erythema. Psychiatric: She has a normal mood and affect. Her behavior is normal.   Nursing note and vitals reviewed. Diagnostic Study Results     Labs -   No results found for this or any previous visit (from the past 12 hour(s)). Radiologic Studies -   XR SPINE CERV 4 OR 5 V   Final Result   IMPRESSION: Patient is status post anterior fusion C4-C7. No fracture is   identified. .        CT Results  (Last 48 hours)    None        CXR Results  (Last 48 hours)    None            Medical Decision Making   I am the first provider for this patient. I reviewed the vital signs, available nursing notes, past medical history, past surgical history, family history and social history. Vital Signs-Reviewed the patient's vital signs.     Records Reviewed: Nursing Notes and Old Medical Records    Provider Notes (Medical Decision Making):     DDx: Torticollis sprain strain spasm degenerative disc disease    ED Course:   Initial assessment performed. The patients presenting problems have been discussed, and they are in agreement with the care plan formulated and outlined with them. I have encouraged them to ask questions as they arise throughout their visit. HYPERTENSION COUNSELING:   Patient denies any current chest pain, headache, shortness of breath, lightheadedness, dizziness, or changes in vision. Patient is made aware of their elevated blood pressure and is instructed to follow up this week with their Primary Care for a recheck. Patient is counseled regarding consequences of chronic, uncontrolled hypertension including kidney disease, heart disease, stroke or even death. Patient verbally states their understanding. SLIME Salguero      PROGRESS NOTE:     Procedure Note - Trigger Point Injection:      Performed by Philippe Albert PA-C  A trigger point injection was performed on the right shoulder. Area was prepped with a alcohol. 1 mL of 0.5% bupivicaine without epinephrine was injected. Total time at bedside, performing this procedure was 5 minutes. The procedure was tolerated well without any complications. 8:47 PM  Pt noted to be feeling better , ready for discharge. Updated pt and/or family on available findings. Will follow up as instructed. All questions have been answered, pt voiced understanding and agreement with plan. Narcotics were prescribed, pt was advised not to drive or operate heavy machinery. Specific return precautions provided as well as instructions to return to the ED should sx worsen at any time. Vital signs stable for discharge. SLIME Salguero    Disposition:    DISCHARGE NOTE:  8:47 PM  The patient's results have been reviewed with patient.   They verbally convey their understanding and agreement of the patient's signs, symptoms, diagnosis, treatment, and prognosis. They additionally agree to follow up as recommended in the discharge instructions or to return to the Emergency Room should the patient's condition change prior to their follow-up appointment. The patient verbally agrees with the care-plan and all of their questions have been answered. The discharge instructions have also been provided to the them along with educational information regarding the patient's diagnosis and a list of reasons why the patient would want to return to the ER prior to their follow-up appointment should their condition change. SLIME Forrest Counter    PLAN:  1. Discharge Medication List as of 5/19/2019  8:47 PM      START taking these medications    Details   cyclobenzaprine (FLEXERIL) 10 mg tablet Take 1 Tab by mouth three (3) times daily as needed for Muscle Spasm(s). Indications: muscle spasm, Normal, Disp-20 Tab, R-0         CONTINUE these medications which have CHANGED    Details   HYDROcodone-acetaminophen (NORCO) 5-325 mg per tablet Take 1 Tab by mouth every six (6) hours as needed for Pain for up to 3 days. Max Daily Amount: 4 Tabs. Indications: Pain, Print, Disp-10 Tab, R-0         CONTINUE these medications which have NOT CHANGED    Details   ketorolac (TORADOL) 10 mg tablet Take 1 Tab by mouth every six (6) hours as needed for Pain., Normal, Disp-10 Tab, R-0      ondansetron (ZOFRAN ODT) 4 mg disintegrating tablet Take 1 Tab by mouth every eight (8) hours as needed for Nausea., Normal, Disp-10 Tab, R-0      TOPIRAMATE (TOPAMAX PO) Take  by mouth., Historical Med      LIRAGLUTIDE (VICTOZA 2-MARILIN SC) by SubCUTAneous route. 1.8 MG QD, Historical Med      metoprolol succinate (TOPROL-XL) 25 mg XL tablet Take 1 Tab by mouth daily. , Normal, Disp-30 Tab, R-3      lisinopril (PRINIVIL, ZESTRIL) 20 mg tablet Historical Med, R-1      atorvastatin (LIPITOR) 20 mg tablet  20 mg = 1 tab each dose, PO, bedtime, 0 Refills, Historical Med      metFORMIN ER (GLUCOPHAGE XR) 500 mg tablet Historical Med, R-0      CONTOUR NEXT STRIPS strip Historical Med, R-1, [place]      alcohol swabs padm Historical Med, R-1      omeprazole (PRILOSEC) 40 mg capsule Take 1 capsule by mouth two (2) times a day., Print, Disp-60 capsule, R-2      albuterol (PROVENTIL HFA, VENTOLIN HFA) 90 mcg/actuation inhaler Take 2 Puffs by inhalation as needed., Historical Med      ANSHUL PEN NEEDLE 32 x 5/32 \" ndle daily. , Historical Med      insulin glargine (LANTUS) 100 unit/mL injection 30 Units by SubCUTAneous route nightly. Indications: HYPERGLYCEMIA, TYPE 2 DIABETES MELLITUS, Historical Med      ARIPiprazole (ABILIFY) 10 mg tablet Take 10 mg by mouth daily. , Historical Med      citalopram (CELEXA) 40 mg tablet Take 40 mg by mouth daily. , Historical Med         STOP taking these medications       tamsulosin (FLOMAX) 0.4 mg capsule Comments:   Reason for Stopping:         diclofenac EC (VOLTAREN) 75 mg EC tablet Comments:   Reason for Stoppin.   Follow-up Information     Follow up With Specialties Details Why Contact Info    Nestor Ferreira, [address] [place] [address] [phone]      [facility] EMERGENCY DEPT Emergency Medicine  If symptoms worsen [address] [place] [address] Drive  [address] [facility]  [phone]        Return to ED if worse     Diagnosis     Clinical Impression:   1. Acute torticollis    2. Acute stress reaction            Please note that this dictation was completed with Surfly, the computer voice recognition software. Quite often unanticipated grammatical, syntax, homophones, and other interpretive errors are inadvertently transcribed by the computer software. Please disregards these errors. Please excuse any errors that have escaped final proofreading. This note will not be viewable in [address]

## 2019-06-25 ENCOUNTER — HOSPITAL ENCOUNTER (OUTPATIENT)
Dept: MAMMOGRAPHY | Age: 54
Discharge: HOME | End: 2019-06-25
Attending: FAMILY MEDICINE
Payer: MEDICARE

## 2019-06-25 DIAGNOSIS — Z12.39 BREAST SCREENING, UNSPECIFIED: ICD-10-CM

## 2019-06-25 PROCEDURE — 77067 SCR MAMMO BI INCL CAD: CPT

## 2019-10-22 ENCOUNTER — APPOINTMENT (OUTPATIENT)
Dept: CT IMAGING | Age: 54
End: 2019-10-22
Attending: EMERGENCY MEDICINE
Payer: MEDICARE

## 2019-10-22 ENCOUNTER — HOSPITAL ENCOUNTER (EMERGENCY)
Age: 54
Discharge: HOME | End: 2019-10-23
Attending: EMERGENCY MEDICINE
Payer: MEDICARE

## 2019-10-22 DIAGNOSIS — N13.2 URETERAL STONE WITH HYDRONEPHROSIS: Primary | ICD-10-CM

## 2019-10-22 DIAGNOSIS — N30.01 ACUTE CYSTITIS WITH HEMATURIA: ICD-10-CM

## 2019-10-22 LAB
APPEARANCE UR: ABNORMAL
BACTERIA URNS QL MICRO: ABNORMAL /HPF
BILIRUB UR QL CFM: NEGATIVE
COLOR UR: ABNORMAL
EPITH CASTS URNS QL MICRO: ABNORMAL /LPF
GLUCOSE UR STRIP.AUTO-MCNC: NEGATIVE MG/DL
HGB UR QL STRIP: ABNORMAL
KETONES UR QL STRIP.AUTO: NEGATIVE MG/DL
LEUKOCYTE ESTERASE UR QL STRIP.AUTO: ABNORMAL
NITRITE UR QL STRIP.AUTO: NEGATIVE
PH UR STRIP: 5.5 [PH] (ref 5–8)
PROT UR STRIP-MCNC: 100 MG/DL
RBC #/AREA URNS HPF: ABNORMAL /HPF (ref 0–5)
SP GR UR REFRACTOMETRY: 1.03 (ref 1–1.03)
UA: UC IF INDICATED,UAUC: ABNORMAL
UROBILINOGEN UR QL STRIP.AUTO: 1 EU/DL (ref 0.2–1)
WBC URNS QL MICRO: ABNORMAL /HPF (ref 0–4)

## 2019-10-22 PROCEDURE — 81001 URINALYSIS AUTO W/SCOPE: CPT

## 2019-10-22 PROCEDURE — 96365 THER/PROPH/DIAG IV INF INIT: CPT

## 2019-10-22 PROCEDURE — 99285 EMERGENCY DEPT VISIT HI MDM: CPT

## 2019-10-22 PROCEDURE — 74011250637 HC RX REV CODE- 250/637: Performed by: EMERGENCY MEDICINE

## 2019-10-22 PROCEDURE — 74176 CT ABD & PELVIS W/O CONTRAST: CPT

## 2019-10-22 PROCEDURE — 87086 URINE CULTURE/COLONY COUNT: CPT

## 2019-10-22 RX ORDER — CEPHALEXIN 250 MG/1
500 CAPSULE ORAL
Status: COMPLETED | OUTPATIENT
Start: 2019-10-22 | End: 2019-10-22

## 2019-10-22 RX ORDER — NAPROXEN 250 MG/1
500 TABLET ORAL
Status: COMPLETED | OUTPATIENT
Start: 2019-10-22 | End: 2019-10-22

## 2019-10-22 RX ADMIN — NAPROXEN 500 MG: 250 TABLET ORAL at 23:11

## 2019-10-22 RX ADMIN — CEPHALEXIN 500 MG: 250 CAPSULE ORAL at 23:31

## 2019-10-22 NOTE — LETTER
[address] 
[facility] EMERGENCY DEPT 
[address] [place] [address] [id number] [phone] Work/School Note Date: 10/22/2019 To Whom It May concern: 
 
Nithya Ferreira was seen and treated today in the emergency room by the following provider(s): 
Attending Provider: Roxy Kasper MD. Nithya Ferreira may return to work on 10/24/19.  
 
Sincerely, 
 
 
 
 
Rea Robbins MD

## 2019-10-23 VITALS
SYSTOLIC BLOOD PRESSURE: 111 MMHG | WEIGHT: 210.1 LBS | RESPIRATION RATE: 18 BRPM | DIASTOLIC BLOOD PRESSURE: 68 MMHG | BODY MASS INDEX: 41.25 KG/M2 | TEMPERATURE: 98.3 F | HEIGHT: 60 IN | OXYGEN SATURATION: 98 % | HEART RATE: 74 BPM

## 2019-10-23 LAB
ANION GAP BLD CALC-SCNC: 16 MMOL/L (ref 10–20)
BASOPHILS # BLD: 0 K/UL (ref 0–0.1)
BASOPHILS NFR BLD: 1 % (ref 0–1)
BUN BLD-MCNC: 20 MG/DL (ref 9–20)
CA-I BLD-MCNC: 1.2 MMOL/L (ref 1.12–1.32)
CHLORIDE BLD-SCNC: 103 MMOL/L (ref 98–107)
CO2 BLD-SCNC: 25 MMOL/L (ref 21–32)
CREAT BLD-MCNC: 1.2 MG/DL (ref 0.6–1.3)
DIFFERENTIAL METHOD BLD: ABNORMAL
EOSINOPHIL # BLD: 0.1 K/UL (ref 0–0.4)
EOSINOPHIL NFR BLD: 2 % (ref 0–7)
ERYTHROCYTE [DISTWIDTH] IN BLOOD BY AUTOMATED COUNT: 15.6 % (ref 11.5–14.5)
GLUCOSE BLD-MCNC: 125 MG/DL (ref 65–100)
HCT VFR BLD AUTO: 37 % (ref 35–47)
HCT VFR BLD CALC: 36 % (ref 35–47)
HGB BLD-MCNC: 12 G/DL (ref 11.5–16)
IMM GRANULOCYTES # BLD AUTO: 0 K/UL (ref 0–0.04)
IMM GRANULOCYTES NFR BLD AUTO: 0 % (ref 0–0.5)
LACTATE BLD-SCNC: 1.11 MMOL/L (ref 0.4–2)
LYMPHOCYTES # BLD: 2.5 K/UL (ref 0.8–3.5)
LYMPHOCYTES NFR BLD: 44 % (ref 12–49)
MCH RBC QN AUTO: 27.1 PG (ref 26–34)
MCHC RBC AUTO-ENTMCNC: 32.4 G/DL (ref 30–36.5)
MCV RBC AUTO: 83.7 FL (ref 80–99)
MONOCYTES # BLD: 0.4 K/UL (ref 0–1)
MONOCYTES NFR BLD: 8 % (ref 5–13)
NEUTS SEG # BLD: 2.6 K/UL (ref 1.8–8)
NEUTS SEG NFR BLD: 45 % (ref 32–75)
NRBC # BLD: 0 K/UL (ref 0–0.01)
NRBC BLD-RTO: 0 PER 100 WBC
PLATELET # BLD AUTO: 181 K/UL (ref 150–400)
PMV BLD AUTO: 9.6 FL (ref 8.9–12.9)
POTASSIUM BLD-SCNC: 3.5 MMOL/L (ref 3.5–5.1)
RBC # BLD AUTO: 4.42 M/UL (ref 3.8–5.2)
SERVICE CMNT-IMP: ABNORMAL
SODIUM BLD-SCNC: 140 MMOL/L (ref 136–145)
WBC # BLD AUTO: 5.8 K/UL (ref 3.6–11)

## 2019-10-23 PROCEDURE — 74011250636 HC RX REV CODE- 250/636: Performed by: EMERGENCY MEDICINE

## 2019-10-23 PROCEDURE — 85025 COMPLETE CBC W/AUTO DIFF WBC: CPT

## 2019-10-23 PROCEDURE — 80047 BASIC METABLC PNL IONIZED CA: CPT

## 2019-10-23 PROCEDURE — 36415 COLL VENOUS BLD VENIPUNCTURE: CPT

## 2019-10-23 PROCEDURE — 74011000258 HC RX REV CODE- 258: Performed by: EMERGENCY MEDICINE

## 2019-10-23 PROCEDURE — 83605 ASSAY OF LACTIC ACID: CPT

## 2019-10-23 RX ORDER — CEPHALEXIN 500 MG/1
500 CAPSULE ORAL 4 TIMES DAILY
Qty: 28 CAP | Refills: 0 | OUTPATIENT
Start: 2019-10-23 | End: 2019-10-23

## 2019-10-23 RX ORDER — HYDROCODONE BITARTRATE AND ACETAMINOPHEN 7.5; 325 MG/1; MG/1
1 TABLET ORAL
Qty: 10 TAB | Refills: 0 | Status: SHIPPED | OUTPATIENT
Start: 2019-10-23 | End: 2019-10-26

## 2019-10-23 RX ORDER — LEVOFLOXACIN 750 MG/1
750 TABLET ORAL DAILY
Qty: 7 TAB | Refills: 0 | Status: SHIPPED | OUTPATIENT
Start: 2019-10-23 | End: 2019-12-26

## 2019-10-23 RX ORDER — KETOROLAC TROMETHAMINE 10 MG/1
10 TABLET, FILM COATED ORAL
Qty: 10 TAB | Refills: 0 | OUTPATIENT
Start: 2019-10-23 | End: 2019-10-28

## 2019-10-23 RX ADMIN — CEFTRIAXONE 1 G: 1 INJECTION, POWDER, FOR SOLUTION INTRAMUSCULAR; INTRAVENOUS at 01:59

## 2019-10-23 NOTE — DISCHARGE INSTRUCTIONS
Patient Education        Learning About Hydronephrosis  What is hydronephrosis? Hydronephrosis is swelling of the kidneys. It is caused by a buildup of urine. This condition can happen if a tube that drains urine from your kidneys is blocked. The blockage can come from within the urinary tract or from pressure outside of the tract. Pregnancy is an example of an outside (external) cause. This condition is often caused by a blockage such as a kidney stone, tumor, or blood clot. It also can be caused by a problem in your urinary system that you were born with (congenital problem). What are the symptoms? Some of the common symptoms are:  · Pain in one or both sides. · Stomach pain. · Blood in your urine. Some people have no symptoms. How is it diagnosed? Your doctor will do an ultrasound to look for a blockage in your urinary system. An ultrasound allows your doctor to see a picture of the organs and other structures in your belly (abdomen). You also may need blood and urine tests. How is it treated? Your treatment depends on the cause of the swelling. If it is caused by a blockage, your treatment will depend on the type of blockage you have. If the blockage is caused by a kidney stone, you may wait for the stone to pass. If hydronephrosis happens during pregnancy, it usually clears up on its own. You may need to have urine drained from your bladder or kidneys. A urinary catheter is a small, flexible tube that can be inserted through the urethra and into the bladder, allowing urine to drain. A nephrostomy catheter is a thin tube placed into your kidney to drain urine. Sometimes surgery is needed to clear the blockage. If you have a blockage, you should begin to feel better after the blockage is gone. Many people recover and have no long-term problems. But some may have kidney damage. If hydronephrosis was left untreated for a long time, the damage can be severe.  Severe damage will require further treatment. Follow-up care is a key part of your treatment and safety. Be sure to make and go to all appointments, and call your doctor if you are having problems. It's also a good idea to know your test results and keep a list of the medicines you take. Where can you learn more? Go to http://tin-viv.info/. Enter S386 in the search box to learn more about \"Learning About Hydronephrosis. \"  Current as of: October 31, 2018  Content Version: 12.2  © 7039-1139 [employer]. Care instructions adapted under license by [employer] (which disclaims liability or warranty for this information). If you have questions about a medical condition or this instruction, always ask your healthcare professional. [address] any warranty or liability for your use of this information.

## 2019-10-23 NOTE — ED NOTES
Discharge paperwork provided to patient at this time. Signed copy placed in scan box. Vital signs stable. Patient in no apparent distress at this time. Mental status at baseline. Discharge paperwork in hand and prescription instructions if applicable.

## 2019-10-23 NOTE — ED PROVIDER NOTES
EMERGENCY DEPARTMENT HISTORY AND PHYSICAL EXAM     ------------------------------------------------------------------------------------------------------  Please note that this dictation was completed with [employer], the [employer] voice recognition software. Quite often unanticipated grammatical, syntax, homophones, and other interpretive errors are inadvertently transcribed by the computer software. Please disregard these errors. Please excuse any errors that have escaped final proofreading.  -----------------------------------------------------------------------------------------------------------------    Date: 10/22/2019  Patient Name: Janene Dempsey    History of Presenting Illness     Chief Complaint   Patient presents with    Blood in Urine     Patient stated that she started noticing some blood in the urine about a week ago and it is getting worse. Patient stated that the pain was intermittent but now it is more of a constant thing. Denies history of UTI's, bladder infections or kidney infections. Denies any OTC pain meds today. History Provided By: Patient    HPI: Janene Dempsey is a 48 y.o. female, with significant pmhx of DM, HTN, Depressions, who presents ambulatory to the ED with c/o 1 week history of lower abdominal pain that is aching in nature without radiation. Is having blood in her urine for the same amount of time without dysuria. Denies having similar symptoms in the past.  Pt specifically denies any recent fevers, chills, nausea, vomiting, diarrhea, CP, SOB, urinary sxs, changes in BM, or headache. PCP: Esperanza Ybarra MD    Social Hx: denies tobacco, denies EtOH, denies Illicit Drugs     There are no other complaints, changes, or physical findings at this time.      Allergies   Allergen Reactions    Zantac [Ranitidine Hcl] Hives         Current Outpatient Medications   Medication Sig Dispense Refill    ketorolac (TORADOL) 10 mg tablet Take 1 Tab by mouth every six (6) hours as needed for Pain. 10 Tab 0    levoFLOXacin (LEVAQUIN) 750 mg tablet Take 1 Tab by mouth daily. 7 Tab 0    HYDROcodone-acetaminophen (LORCET PLUS) 7.5-325 mg per tablet Take 1 Tab by mouth every six (6) hours as needed for Pain for up to 3 days. Max Daily Amount: 4 Tabs. 10 Tab 0    mirabegron ER (MYRBETRIQ) 25 mg ER tablet Take 25 mg by mouth daily.  pramipexole di-HCl (PRAMIPEXOLE PO) Take  by mouth.  cyclobenzaprine (FLEXERIL) 10 mg tablet Take 1 Tab by mouth three (3) times daily as needed for Muscle Spasm(s). Indications: muscle spasm 20 Tab 0    ketorolac (TORADOL) 10 mg tablet Take 1 Tab by mouth every six (6) hours as needed for Pain. 10 Tab 0    ondansetron (ZOFRAN ODT) 4 mg disintegrating tablet Take 1 Tab by mouth every eight (8) hours as needed for Nausea. 10 Tab 0    TOPIRAMATE (TOPAMAX PO) Take  by mouth.  LIRAGLUTIDE (VICTOZA 2-MARILIN SC) by SubCUTAneous route. 1.8 MG QD      metoprolol succinate (TOPROL-XL) 25 mg XL tablet Take 1 Tab by mouth daily. 30 Tab 3    lisinopril (PRINIVIL, ZESTRIL) 20 mg tablet   1    atorvastatin (LIPITOR) 20 mg tablet  20 mg = 1 tab each dose, PO, bedtime, 0 Refills      metFORMIN ER (GLUCOPHAGE XR) 500 mg tablet   0    CONTOUR NEXT STRIPS strip   1    alcohol swabs padm   1    omeprazole (PRILOSEC) 40 mg capsule Take 1 capsule by mouth two (2) times a day. 60 capsule 2    albuterol (PROVENTIL HFA, VENTOLIN HFA) 90 mcg/actuation inhaler Take 2 Puffs by inhalation as needed.  ANSHUL PEN NEEDLE 32 x 5/32 \" ndle daily.  insulin glargine (LANTUS) 100 unit/mL injection 30 Units by SubCUTAneous route nightly. Indications: HYPERGLYCEMIA, TYPE 2 DIABETES MELLITUS      ARIPiprazole (ABILIFY) 10 mg tablet Take 10 mg by mouth daily.  citalopram (CELEXA) 40 mg tablet Take 40 mg by mouth daily.            Past History     Past Medical History:  Past Medical History:   Diagnosis Date    Chronic pelvic pain in female 7/9/2014    Depression  Diabetes ([facility] [address]     Hypertension     Obesity (BMI 35.0-39.9 without comorbidity)     SHAGUFTA on CPAP 3/9/2017    Posttraumatic stress disorder     Psychiatric disorder     depression    Recurrent [facility] ([facility] 2010    S/P ventral herniorrhaphy 2018    Suicidal thoughts     Thyroid nodule 10/1/2015    Unspecified sleep apnea     uses cpap       Past Surgical History:  Past Surgical History:   Procedure Laterality Date    ABDOMEN SURGERY PROC UNLISTED      cholecystectomy    HX  SECTION      HX  SECTION      HX HEENT      head and neck surgery     HX HERNIA REPAIR  2018    incarcerated ventral hernia repair with mesh    HX HYSTERECTOMY      HX ORTHOPAEDIC      L ankle surgery     HX TUBAL LIGATION      PPBTL       Family History:  Family History   Problem Relation Age of Onset    Cancer Mother 61        Breast cancer   [facility] Breast Cancer Mother 48    Heart Disease Father 50        [place]   [facility] Asthma Sister     Heart Disease Brother     Hypertension Brother     Hypertension Brother     Diabetes Brother     Lung Disease Brother         COPD       Social History:  Social History     Tobacco Use    Smoking status: Current Every Day Smoker     Packs/day: 0.50     Years: 20.00     Pack years: 10.00    Smokeless tobacco: Never Used   Substance Use Topics    Alcohol use: No    Drug use: No       Allergies: Allergies   Allergen Reactions    Zantac [Ranitidine Hcl] Hives         Review of Systems   Review of Systems   Constitutional: Negative. Negative for fever. Eyes: Negative. Respiratory: Negative. Negative for shortness of breath. Cardiovascular: Negative for chest pain. Gastrointestinal: Positive for abdominal pain. Negative for nausea and vomiting. Endocrine: Negative. Genitourinary: Positive for flank pain and hematuria. Negative for difficulty urinating and dysuria. Skin: Negative. Neurological: Negative. Psychiatric/Behavioral: Negative for suicidal ideas. All other systems reviewed and are negative. Physical Exam   Physical Exam   Constitutional: She is oriented to person, place, and time. She appears well-developed and well-nourished. No distress. HENT:   Head: Normocephalic and atraumatic. Nose: Nose normal.   Eyes: Conjunctivae and EOM are normal. No scleral icterus. Neck: Normal range of motion. No tracheal deviation present. Cardiovascular: Normal rate, regular rhythm, normal heart sounds and intact distal pulses. Exam reveals no friction rub. No murmur heard. Pulmonary/Chest: Effort normal and breath sounds normal. No stridor. No respiratory distress. She has no wheezes. She has no rales. Abdominal: Soft. Bowel sounds are normal. She exhibits no distension. There is tenderness. There is no rebound. Rigidity: L. Musculoskeletal: Normal range of motion. She exhibits no tenderness. Neurological: She is alert and oriented to person, place, and time. No cranial nerve deficit. Skin: Skin is warm and dry. No rash noted. She is not diaphoretic. Psychiatric: She has a normal mood and affect. Her speech is normal and behavior is normal. Judgment and thought content normal. Cognition and memory are normal.   Nursing note and vitals reviewed.         Diagnostic Study Results     Labs -     Recent Results (from the past 12 hour(s))   URINALYSIS W/ REFLEX CULTURE    Collection Time: 10/22/19  9:52 PM   Result Value Ref Range    Color DARK YELLOW      Appearance CLOUDY (A) CLEAR      Specific gravity 1.028 1.003 - 1.030      pH (UA) 5.5 5.0 - 8.0      Protein 100 (A) NEG mg/dL    Glucose NEGATIVE  NEG mg/dL    Ketone NEGATIVE  NEG mg/dL    Blood LARGE (A) NEG      Urobilinogen 1.0 0.2 - 1.0 EU/dL    Nitrites NEGATIVE  NEG      Leukocyte Esterase SMALL (A) NEG      WBC 10-20 0 - 4 /hpf    RBC  0 - 5 /hpf    Epithelial cells FEW FEW /lpf    Bacteria 2+ (A) NEG /hpf    UA:UC IF INDICATED URINE CULTURE ORDERED (A) CNI     BILIRUBIN, CONFIRM    Collection Time: 10/22/19  9:52 PM   Result Value Ref Range    Bilirubin UA, confirm NEGATIVE  NEG     CBC WITH AUTOMATED DIFF    Collection Time: 10/23/19 12:32 AM   Result Value Ref Range    WBC 5.8 3.6 - 11.0 K/uL    RBC 4.42 3.80 - 5.20 M/uL    HGB 12.0 11.5 - 16.0 g/dL    HCT 37.0 35.0 - 47.0 %    MCV 83.7 80.0 - 99.0 FL    MCH 27.1 26.0 - 34.0 PG    MCHC 32.4 30.0 - 36.5 g/dL    RDW 15.6 (H) 11.5 - 14.5 %    PLATELET 864 676 - 241 K/uL    MPV 9.6 8.9 - 12.9 FL    NRBC 0.0 0  WBC    ABSOLUTE NRBC 0.00 0.00 - 0.01 K/uL    NEUTROPHILS 45 32 - 75 %    LYMPHOCYTES 44 12 - 49 %    MONOCYTES 8 5 - 13 %    EOSINOPHILS 2 0 - 7 %    BASOPHILS 1 0 - 1 %    IMMATURE GRANULOCYTES 0 0.0 - 0.5 %    ABS. NEUTROPHILS 2.6 1.8 - 8.0 K/UL    ABS. LYMPHOCYTES 2.5 0.8 - 3.5 K/UL    ABS. MONOCYTES 0.4 0.0 - 1.0 K/UL    ABS. EOSINOPHILS 0.1 0.0 - 0.4 K/UL    ABS. BASOPHILS 0.0 0.0 - 0.1 K/UL    ABS. IMM. GRANS. 0.0 0.00 - 0.04 K/UL    DF AUTOMATED     POC CHEM8    Collection Time: 10/23/19 12:32 AM   Result Value Ref Range    Calcium, ionized (POC) 1.20 1. 12 - 1.32 mmol/L    Sodium (POC) 140 136 - 145 mmol/L    Potassium (POC) 3.5 3.5 - 5.1 mmol/L    Chloride (POC) 103 98 - 107 mmol/L    CO2 (POC) 25 21 - 32 mmol/L    Anion gap (POC) 16 10 - 20 mmol/L    Glucose (POC) 125 (H) 65 - 100 mg/dL    BUN (POC) 20 9 - 20 mg/dL    Creatinine (POC) 1.2 0.6 - 1.3 mg/dL    GFRAA, POC 57 (L) >60 ml/min/1.73m2    GFRNA, POC 47 (L) >60 ml/min/1.73m2    Hematocrit (POC) 36 35.0 - 47.0 %    Comment Comment Not Indicated. POC LACTIC ACID    Collection Time: 10/23/19 12:32 AM   Result Value Ref Range    Lactic Acid (POC) 1.11 0.40 - 2.00 mmol/L       Radiologic Studies -   CT ABD PELV WO CONT   Final Result   IMPRESSION:   1. Right mild hydronephrosis due to a 12 mm stone at the UPJ. 2. There are also bilateral small nonobstructing intrarenal stones.             CT Results  (Last 48 hours)               10/22/19 2305  CT ABD PELV WO CONT Final result    Impression:  IMPRESSION:   1. Right mild hydronephrosis due to a 12 mm stone at the UPJ. 2. There are also bilateral small nonobstructing intrarenal stones. Narrative:  INDICATION: Flank pain, stone disease suspected        EXAM: CT Abdomen and Pelvis without IV contrast. No oral contrast.   CT dose reduction was achieved through use of a standardized protocol tailored   for this examination and automatic exposure control for dose modulation. FINDINGS:    There is mild right hydronephrosis due to a 12 mm stone in the right renal   pelvis at the UPJ level. There are bilateral punctate nonobstructing intrarenal stones. There is no left   hydronephrosis. There is no perirenal fluid or ascites. Liver shows no apparent significant finding without contrast. Pancreas, adrenal   glands, spleen and aorta show no significant enlargement. There is no   pneumoperitoneum or significant adenopathy. The bladder is empty. The distal ureters are not dilated. There is no apparent   pelvic mass. Bowels are not dilated. CXR Results  (Last 48 hours)    None            Medical Decision Making   I am the first provider for this patient. I reviewed the vital signs, available nursing notes, past medical history, past surgical history, family history and social history. Vital Signs-Reviewed the patient's vital signs.   Patient Vitals for the past 12 hrs:   Temp Pulse Resp BP SpO2   10/23/19 0230    111/68 98 %   10/23/19 0200    98/65 99 %   10/23/19 0145    95/67 100 %   10/23/19 0001    106/60    10/22/19 2345    98/67 97 %   10/22/19 2330    112/62 97 %   10/22/19 2113 98.3 °F (36.8 °C) 74 18 116/79 100 %       Pulse Oximetry Analysis - 100% on RA    Records Reviewed: Nursing Notes, Old Medical Records, Previous Radiology Studies and Previous Laboratory Studies    Provider Notes (Medical Decision Making):     DDX:  UTI, kidney stone    Plan:  UA, labs, CT without    Impression:  12mm uretral stone, uti    ED Course:   Initial assessment performed. The patients presenting problems have been discussed, and they are in agreement with the care plan formulated and outlined with them. I have encouraged them to ask questions as they arise throughout their visit. I reviewed our electronic medical record system for any past medical records that were available that may contribute to the patients current condition, the nursing notes and and vital signs from today's visit  Nursing notes will be reviewed as they become available in realtime while the pt has been in the ED. Pedro Tdod MD    I personally reviewed pt's imaging. Official read by radiology noted above. Pedro Todd MD    CONSULT NOTE:   1:23 AM  Pedro Todd MD spoke with Dr. Lindy Paez  Specialty: Urology  Consulted Dr. Lindy Paez due to 12mm stone with uti. Discussed pt's HPI and available diagnostic results thus far. Consultant recommends only the kidney stone hotline to arrange for appointment tomorrow at [place] office. Also recommends providing dose of Rocephin prior to discharge and discharging him with Levaquin antibiotic prescription. Pedro Todd MD      PROGRESS NOTE  1:59 AM  Spoke with nurse from [place] [facility] kidney stone hotline who arranged for patient to have appointment at 10 AM tomorrow morning at the [facility] office with the nurse practitioner. Will provide antibiotics as recommended and plan for discharge home with instructions. 0329  Progress note:  Pt noted to be feeling better, ready for discharge. Discussed lab and imaging findings with pt, specifically noting ureteral stone and [facility] Pt will follow up with Urology as instructed. All questions have been answered, pt voiced understanding and agreement with plan.     Abx were prescribed, pt advised that diarrhea and rash are possible side effects of the medications. Specific return precautions provided in addition to instructions for pt to return to the ED immediately should sx worsen at any time. Alma Guerrero MD             Critical Care Time:     none      Diagnosis     Clinical Impression:   1. Ureteral stone with hydronephrosis    2. Acute cystitis with hematuria        PLAN:  1. Discharge Medication List as of 10/23/2019  1:56 AM      START taking these medications    Details   !! ketorolac (TORADOL) 10 mg tablet Take 1 Tab by mouth every six (6) hours as needed for Pain., Normal, Disp-10 Tab, R-0      levoFLOXacin (LEVAQUIN) 750 mg tablet Take 1 Tab by mouth daily. , Normal, Disp-7 Tab, R-0      HYDROcodone-acetaminophen (LORCET PLUS) 7.5-325 mg per tablet Take 1 Tab by mouth every six (6) hours as needed for Pain for up to 3 days. Max Daily Amount: 4 Tabs., Print, Disp-10 Tab, R-0       !! - Potential duplicate medications found. Please discuss with provider. CONTINUE these medications which have NOT CHANGED    Details   mirabegron ER (MYRBETRIQ) 25 mg ER tablet Take 25 mg by mouth daily. , Historical Med      pramipexole di-HCl (PRAMIPEXOLE PO) Take  by mouth., Historical Med      cyclobenzaprine (FLEXERIL) 10 mg tablet Take 1 Tab by mouth three (3) times daily as needed for Muscle Spasm(s). Indications: muscle spasm, Print, Disp-20 Tab, R-0      !! ketorolac (TORADOL) 10 mg tablet Take 1 Tab by mouth every six (6) hours as needed for Pain., Normal, Disp-10 Tab, R-0      ondansetron (ZOFRAN ODT) 4 mg disintegrating tablet Take 1 Tab by mouth every eight (8) hours as needed for Nausea., Normal, Disp-10 Tab, R-0      TOPIRAMATE (TOPAMAX PO) Take  by mouth., Historical Med      LIRAGLUTIDE (VICTOZA 2-MARILIN SC) by SubCUTAneous route. 1.8 MG QD, Historical Med      metoprolol succinate (TOPROL-XL) 25 mg XL tablet Take 1 Tab by mouth daily. , Normal, Disp-30 Tab, R-3      lisinopril (Kimmy Rife) 20 mg tablet Historical Med, R-1      atorvastatin (LIPITOR) 20 mg tablet  20 mg = 1 tab each dose, PO, bedtime, 0 Refills, Historical Med      metFORMIN ER (GLUCOPHAGE XR) 500 mg tablet Historical Med, R-0      CONTOUR NEXT STRIPS strip Historical Med, R-1, [place]      alcohol swabs padm Historical Med, R-1      omeprazole (PRILOSEC) 40 mg capsule Take 1 capsule by mouth two (2) times a day., Print, Disp-60 capsule, R-2      albuterol (PROVENTIL HFA, VENTOLIN HFA) 90 mcg/actuation inhaler Take 2 Puffs by inhalation as needed., Historical Med      ANSHUL PEN NEEDLE 32 x 5/32 \" ndle daily. , Historical Med      insulin glargine (LANTUS) 100 unit/mL injection 30 Units by SubCUTAneous route nightly. Indications: HYPERGLYCEMIA, TYPE 2 DIABETES MELLITUS, Historical Med      ARIPiprazole (ABILIFY) 10 mg tablet Take 10 mg by mouth daily. , Historical Med      citalopram (CELEXA) 40 mg tablet Take 40 mg by mouth daily. , Historical Med       !! - Potential duplicate medications found. Please discuss with provider. 2.   Follow-up Information     Follow up With Specialties Details Why Contact Info    Shi Mejia MD [facility] Schedule an appointment as soon as possible for a visit in 2 days  [address] [place] [address]  [phone]      [place] [facility]  Go to 10am appointment. Ms. Dave Landry NP [facility]  [address] [place] [address]        Return to ED if worse     Disposition:    [phone]  The patient's results have been reviewed with family and/or caregiver. They verbally convey their understanding and agreement of the patient's signs, symptoms, diagnosis, treatment and prognosis and additionally agree to follow up as recommended in the discharge instructions or to return to the Emergency Room should the patient's condition change prior to their follow-up appointment.  The family and/or caregiver verbally agrees with the care-plan and all of their questions have been answered. The discharge instructions have also been provided to the them with educational information regarding the patient's diagnosis as well a list of reasons why the patient would want to return to the ER prior to their follow-up appointment should their condition change. Demetrius Grimes MD          This note will not be viewable in [address]

## 2019-10-23 NOTE — ED NOTES
Assumed care of pt via triage. Pt in ED for having blood in her urine for about a week and noticed that is was getting worse over the last day. Pt states she has been having generalized abdominal pain as well. Pt denies any nausea or vomiting at this time. Pt also denies any pain or burning with urination. Pt resting comfortably on stretcher in position of comfort. Pt in no apparent distress at this time. Call bell within reach. Side rails x2. Connected to monitor x2. Pt a/o x4. Stretcher locked in lowest position. Pt aware of plan to await for MD/PA-C/NP assessment, and pt/family verbalizes understanding. Will continue to monitor pt condition.

## 2019-10-24 LAB
BACTERIA SPEC CULT: NORMAL
CC UR VC: NORMAL
SERVICE CMNT-IMP: NORMAL

## 2019-10-28 ENCOUNTER — APPOINTMENT (OUTPATIENT)
Dept: CT IMAGING | Age: 54
End: 2019-10-28
Attending: EMERGENCY MEDICINE
Payer: MEDICARE

## 2019-10-28 ENCOUNTER — HOSPITAL ENCOUNTER (EMERGENCY)
Age: 54
Discharge: HOME | End: 2019-10-28
Attending: EMERGENCY MEDICINE
Payer: MEDICARE

## 2019-10-28 VITALS
RESPIRATION RATE: 5 BRPM | OXYGEN SATURATION: 99 % | TEMPERATURE: 97.8 F | SYSTOLIC BLOOD PRESSURE: 103 MMHG | HEIGHT: 60 IN | DIASTOLIC BLOOD PRESSURE: 67 MMHG | WEIGHT: 210.76 LBS | HEART RATE: 92 BPM | BODY MASS INDEX: 41.38 KG/M2

## 2019-10-28 DIAGNOSIS — K92.1 BLOOD IN STOOL: Primary | ICD-10-CM

## 2019-10-28 DIAGNOSIS — Z87.442 HISTORY OF KIDNEY STONES: ICD-10-CM

## 2019-10-28 LAB
ALBUMIN SERPL-MCNC: 3.8 G/DL (ref 3.5–5)
ALBUMIN/GLOB SERPL: 1.1 {RATIO} (ref 1.1–2.2)
ALP SERPL-CCNC: 81 U/L (ref 45–117)
ALT SERPL-CCNC: 22 U/L (ref 12–78)
ANION GAP SERPL CALC-SCNC: 8 MMOL/L (ref 5–15)
AST SERPL-CCNC: 18 U/L (ref 15–37)
BASOPHILS # BLD: 0 K/UL (ref 0–0.1)
BASOPHILS NFR BLD: 1 % (ref 0–1)
BILIRUB SERPL-MCNC: 0.4 MG/DL (ref 0.2–1)
BUN SERPL-MCNC: 16 MG/DL (ref 6–20)
BUN/CREAT SERPL: 15 (ref 12–20)
CALCIUM SERPL-MCNC: 8.9 MG/DL (ref 8.5–10.1)
CHLORIDE SERPL-SCNC: 108 MMOL/L (ref 97–108)
CO2 SERPL-SCNC: 25 MMOL/L (ref 21–32)
CREAT SERPL-MCNC: 1.09 MG/DL (ref 0.55–1.02)
DIFFERENTIAL METHOD BLD: ABNORMAL
EOSINOPHIL # BLD: 0.1 K/UL (ref 0–0.4)
EOSINOPHIL NFR BLD: 1 % (ref 0–7)
ERYTHROCYTE [DISTWIDTH] IN BLOOD BY AUTOMATED COUNT: 15.7 % (ref 11.5–14.5)
GLOBULIN SER CALC-MCNC: 3.4 G/DL (ref 2–4)
GLUCOSE SERPL-MCNC: 86 MG/DL (ref 65–100)
HCT VFR BLD AUTO: 37.6 % (ref 35–47)
HEMOCCULT STL QL: NEGATIVE
HGB BLD-MCNC: 12.3 G/DL (ref 11.5–16)
IMM GRANULOCYTES # BLD AUTO: 0 K/UL (ref 0–0.04)
IMM GRANULOCYTES NFR BLD AUTO: 0 % (ref 0–0.5)
INR PPP: 1 (ref 0.9–1.1)
LYMPHOCYTES # BLD: 1.9 K/UL (ref 0.8–3.5)
LYMPHOCYTES NFR BLD: 34 % (ref 12–49)
MCH RBC QN AUTO: 27.3 PG (ref 26–34)
MCHC RBC AUTO-ENTMCNC: 32.7 G/DL (ref 30–36.5)
MCV RBC AUTO: 83.4 FL (ref 80–99)
MONOCYTES # BLD: 0.4 K/UL (ref 0–1)
MONOCYTES NFR BLD: 7 % (ref 5–13)
NEUTS SEG # BLD: 3.2 K/UL (ref 1.8–8)
NEUTS SEG NFR BLD: 57 % (ref 32–75)
NRBC # BLD: 0 K/UL (ref 0–0.01)
NRBC BLD-RTO: 0 PER 100 WBC
PLATELET # BLD AUTO: 178 K/UL (ref 150–400)
PMV BLD AUTO: 10.3 FL (ref 8.9–12.9)
POTASSIUM SERPL-SCNC: 3.8 MMOL/L (ref 3.5–5.1)
PROT SERPL-MCNC: 7.2 G/DL (ref 6.4–8.2)
PROTHROMBIN TIME: 10.6 SEC (ref 9–11.1)
RBC # BLD AUTO: 4.51 M/UL (ref 3.8–5.2)
SODIUM SERPL-SCNC: 141 MMOL/L (ref 136–145)
WBC # BLD AUTO: 5.6 K/UL (ref 3.6–11)

## 2019-10-28 PROCEDURE — 85610 PROTHROMBIN TIME: CPT

## 2019-10-28 PROCEDURE — 74011636320 HC RX REV CODE- 636/320: Performed by: EMERGENCY MEDICINE

## 2019-10-28 PROCEDURE — 36415 COLL VENOUS BLD VENIPUNCTURE: CPT

## 2019-10-28 PROCEDURE — 85025 COMPLETE CBC W/AUTO DIFF WBC: CPT

## 2019-10-28 PROCEDURE — 74177 CT ABD & PELVIS W/CONTRAST: CPT

## 2019-10-28 PROCEDURE — 99283 EMERGENCY DEPT VISIT LOW MDM: CPT

## 2019-10-28 PROCEDURE — 80053 COMPREHEN METABOLIC PANEL: CPT

## 2019-10-28 PROCEDURE — 82272 OCCULT BLD FECES 1-3 TESTS: CPT

## 2019-10-28 RX ORDER — SODIUM CHLORIDE 0.9 % (FLUSH) 0.9 %
10 SYRINGE (ML) INJECTION
Status: COMPLETED | OUTPATIENT
Start: 2019-10-28 | End: 2019-10-28

## 2019-10-28 RX ORDER — TRAMADOL HYDROCHLORIDE 50 MG/1
50 TABLET ORAL
Qty: 10 TAB | Refills: 0 | Status: SHIPPED | OUTPATIENT
Start: 2019-10-28 | End: 2019-10-31

## 2019-10-28 RX ADMIN — IOPAMIDOL 100 ML: 755 INJECTION, SOLUTION INTRAVENOUS at 04:48

## 2019-10-28 RX ADMIN — Medication 10 ML: at 04:48

## 2019-10-28 NOTE — DISCHARGE INSTRUCTIONS
Patient Education        Lower Gastrointestinal Bleeding: Care Instructions  Your Care Instructions    The digestive or gastrointestinal tract goes from the mouth to the anus. It is often called the GI tract. Bleeding in the lower GI tract can happen anywhere in your small or large intestine. It can also happen in your rectum or anus. In some cases, it is caused by an infection, cancer, or inflammatory bowel disease. Or it may be caused by hemorrhoids, diverticulitis, or clotting problems. Light bleeding may not cause any symptoms at first. But if you continue to bleed for a while, you may feel very weak or tired. Sudden, heavy bleeding means you need to see a doctor right away. This kind of bleeding can be very dangerous. But it can usually be cured or controlled. The doctor may do some tests to find the cause of your bleeding. Follow-up care is a key part of your treatment and safety. Be sure to make and go to all appointments, and call your doctor if you are having problems. It's also a good idea to know your test results and keep a list of the medicines you take. How can you care for yourself at home? · Be safe with medicines. Take your medicines exactly as prescribed. Call your doctor if you think you are having a problem with your medicine. You will get more details on the specific medicines your doctor prescribes. · Do not take aspirin or other anti-inflammatory medicines, such as naproxen (Aleve) or ibuprofen (Advil, Motrin), without talking to your doctor first. Ask your doctor if it is okay to use acetaminophen (Tylenol). · Do not drink alcohol. · The bleeding may make you lose iron. So it's important to eat foods that have a lot of iron. These include red meat, shellfish, poultry, and eggs. They also include beans, raisins, whole-grain breads, and leafy green vegetables. If you want help planning meals, you can meet with a dietitian. When should you call for help?   Call 911 anytime you think you may need emergency care. For example, call if:    · You have sudden, severe belly pain.     · You vomit blood or what looks like coffee grounds.     · You passed out (lost consciousness).     · Your stools are maroon or very bloody.    Call your doctor now or seek immediate medical care if:    · You are dizzy or lightheaded, or you feel like you may faint.     · Your stools are black and look like tar, or they have streaks of blood.     · You have belly pain.     · You vomit or have nausea.    Watch closely for changes in your health, and be sure to contact your doctor if you do not get better as expected. Where can you learn more? Go to http://tin-viv.info/. Enter Q861 in the search box to learn more about \"Lower Gastrointestinal Bleeding: Care Instructions. \"  Current as of: June 26, 2019  Content Version: 12.2  © 5188-5464 [employer]. Care instructions adapted under license by [employer] (which disclaims liability or warranty for this information). If you have questions about a medical condition or this instruction, always ask your healthcare professional. [place] [address] any warranty or liability for your use of this information.

## 2019-10-28 NOTE — ED PROVIDER NOTES
EMERGENCY DEPARTMENT HISTORY AND PHYSICAL EXAM     ----------------------------------------------------------------------------  Please note that this dictation was completed with [employer], the computer voice recognition software. Quite often unanticipated grammatical, syntax, homophones, and other interpretive errors are inadvertently transcribed by the computer software. Please disregard these errors. Please excuse any errors that have escaped final proofreading  ----------------------------------------------------------------------------      Date: 10/28/2019  Patient Name: Cosme Vanegas    History of Presenting Illness     Chief Complaint   Patient presents with    Melena     Patient reports it has been in there all week. History Provided By:  Patient    HPI: Cosme Vanegas is a 48 y.o. female, with significant pmhx of, depression, chronic pelvic pain, 12 mm ureteral stone scheduled for surgery next week, recent UTI, who presents ambulatory to the ED with c/o rectal bleeding, patient reports noticing blood in her stool yesterday and darker colors today. She continues to have abdominal pain that she had when I saw her last week for her 12 meal millimeters stone. Notes that she followed up with urology as previously assigned and is scheduled to have surgery next week. Notes she continues to have hematuria as well. Patient denies any new medications other than what was prescribed to her out of the emergency department. Patient specifically denies any associated fevers, chills, nausea, vomiting, diarrhea, CP, SOB  or headache. Social Hx: + tobacco  denies EtOH , denies Illicit Drugs    There are no other complaints, changes, or physical findings at this time.      PCP: Jolie Minaya MD    Allergies   Allergen Reactions    Zantac [Ranitidine Hcl] Hives       Current Outpatient Medications   Medication Sig Dispense Refill    traMADol (ULTRAM) 50 mg tablet Take 1 Tab by mouth every six (6) hours as needed for Pain for up to 3 days. Max Daily Amount: 200 mg. 10 Tab 0    mirabegron ER (MYRBETRIQ) 25 mg ER tablet Take 25 mg by mouth daily.  pramipexole di-HCl (PRAMIPEXOLE PO) Take  by mouth.  TOPIRAMATE (TOPAMAX PO) Take  by mouth.  LIRAGLUTIDE (VICTOZA 2-MARILIN SC) by SubCUTAneous route. 1.8 MG QD      metoprolol succinate (TOPROL-XL) 25 mg XL tablet Take 1 Tab by mouth daily. 30 Tab 3    lisinopril (PRINIVIL, ZESTRIL) 20 mg tablet   1    atorvastatin (LIPITOR) 20 mg tablet  20 mg = 1 tab each dose, PO, bedtime, 0 Refills      metFORMIN ER (GLUCOPHAGE XR) 500 mg tablet   0    omeprazole (PRILOSEC) 40 mg capsule Take 1 capsule by mouth two (2) times a day. 60 capsule 2    albuterol (PROVENTIL HFA, VENTOLIN HFA) 90 mcg/actuation inhaler Take 2 Puffs by inhalation as needed.  ARIPiprazole (ABILIFY) 10 mg tablet Take 10 mg by mouth daily.  citalopram (CELEXA) 40 mg tablet Take 40 mg by mouth daily.  levoFLOXacin (LEVAQUIN) 750 mg tablet Take 1 Tab by mouth daily. 7 Tab 0    ondansetron (ZOFRAN ODT) 4 mg disintegrating tablet Take 1 Tab by mouth every eight (8) hours as needed for Nausea. 10 Tab 0    CONTOUR NEXT STRIPS strip   1    alcohol swabs padm   1    ANSHUL PEN NEEDLE 32 x 5/32 \" ndle daily.  insulin glargine (LANTUS) 100 unit/mL injection 30 Units by SubCUTAneous route nightly.  Indications: HYPERGLYCEMIA, TYPE 2 DIABETES MELLITUS         Past History     Past Medical History:  Past Medical History:   Diagnosis Date    Chronic pelvic pain in female 7/9/2014    Depression     Diabetes [facility] [address]     Hypertension     Obesity (BMI 35.0-39.9 without comorbidity)     SHAGUFTA on CPAP 3/9/2017    Posttraumatic stress disorder     Psychiatric disorder     depression    Recurrent major depression ([facility] [address] 7/27/2010    S/P ventral herniorrhaphy 2/1/2018    Suicidal thoughts     Thyroid nodule 10/1/2015    Unspecified sleep apnea     uses cpap       Past Surgical History:  Past Surgical History:   Procedure Laterality Date    ABDOMEN SURGERY PROC UNLISTED      cholecystectomy    HX  SECTION      HX  SECTION      HX HEENT      head and neck surgery     HX HERNIA REPAIR  2018    incarcerated ventral hernia repair with mesh    HX HYSTERECTOMY      HX ORTHOPAEDIC      L ankle surgery     HX TUBAL LIGATION  1998    PPBTL       Family History:  Family History   Problem Relation Age of Onset    Cancer Mother 61        Breast cancer   [facility] Breast Cancer Mother 48    Heart Disease Father 50        M. I.   [facility] Asthma Sister     Heart Disease Brother     Hypertension Brother     Hypertension Brother     Diabetes Brother     Lung Disease Brother         COPD       Social History:  Social History     Tobacco Use    Smoking status: Current Every Day Smoker     Packs/day: 0.50     Years: 20.00     Pack years: 10.00    Smokeless tobacco: Never Used   Substance Use Topics    Alcohol use: No    Drug use: No       Allergies: Allergies   Allergen Reactions    Zantac [Ranitidine Hcl] Hives         Review of Systems   Review of Systems   Constitutional: Negative. Negative for fever. Eyes: Negative. Respiratory: Negative. Negative for shortness of breath. Cardiovascular: Negative for chest pain. Gastrointestinal: Positive for anal bleeding and blood in stool. Negative for abdominal pain, nausea and vomiting. Endocrine: Negative. Genitourinary: Positive for hematuria and pelvic pain. Negative for difficulty urinating and dysuria. Musculoskeletal: Negative. Skin: Negative. Neurological: Negative. Psychiatric/Behavioral: Negative for suicidal ideas. All other systems reviewed and are negative. Physical Exam   Physical Exam   Constitutional: She is oriented to person, place, and time. She appears well-developed and well-nourished. No distress. HENT:   Head: Normocephalic and atraumatic.    Nose: Nose normal.   Eyes: Conjunctivae and EOM are normal. No scleral icterus. Neck: Normal range of motion. No tracheal deviation present. Cardiovascular: Normal rate, regular rhythm, normal heart sounds and intact distal pulses. Exam reveals no friction rub. No murmur heard. Pulmonary/Chest: Effort normal and breath sounds normal. No stridor. No respiratory distress. She has no wheezes. She has no rales. Abdominal: Soft. Bowel sounds are normal. She exhibits no distension. There is no tenderness. There is no rebound. Genitourinary: Rectal exam shows no external hemorrhoid, no internal hemorrhoid, no fissure and guaiac negative stool. Pelvic exam was performed with patient in the knee-chest position. Musculoskeletal: Normal range of motion. She exhibits no tenderness. Neurological: She is alert and oriented to person, place, and time. No cranial nerve deficit. She exhibits normal muscle tone. Skin: Skin is warm and dry. No rash noted. She is not diaphoretic. Psychiatric: She has a normal mood and affect. Her speech is normal and behavior is normal. Judgment and thought content normal. Cognition and memory are normal.   Nursing note and vitals reviewed. Diagnostic Study Results     Labs -   No results found for this or any previous visit (from the past 12 hour(s)). Radiologic Studies -   CT ABD PELV W CONT   Final Result   IMPRESSION:    Stable mild right hydronephrosis with an 11 mm calculus in the right renal   pelvis. Bilateral nonobstructing renal calculi are also noted. CT Results  (Last 48 hours)               10/28/19 0448  CT ABD PELV W CONT Final result    Impression:  IMPRESSION:    Stable mild right hydronephrosis with an 11 mm calculus in the right renal   pelvis. Bilateral nonobstructing renal calculi are also noted. Narrative:  EXAM:  CT ABD PELV W CONT       INDICATION: Rectal bleeding. COMPARISON: CT 10/22/2019.        TECHNIQUE: Helical CT of the abdomen  and pelvis following the uneventful   intravenous administration of nonionic contrast.  Coronal and sagittal reformats   are performed. CT dose reduction was achieved through use of a standardized   protocol tailored for this examination and automatic exposure control for dose   modulation. FINDINGS:    The visualized lung bases demonstrate no mass or consolidation. The heart size   is normal. There is no pericardial or pleural effusion. There is a small hiatal   hernia. The liver, spleen, pancreas, and adrenal glands are normal. The gall bladder is   surgically absent without intra- or extra-hepatic biliary dilatation. The kidneys enhance symmetrically. There is stable mild right hydronephrosis   with an 11 mm calculus in the right renal pelvis. There are bilateral   nonobstructing calculi measuring up to 3 mm. There are no dilated bowel loops. The appendix is normal.         There are no enlarged lymph nodes. There is no free fluid or free air. The   aorta tapers without aneurysm. The urinary bladder is normal.  There is no pelvic mass. There is a left hip prosthesis. There are degenerative changes in the right hip. There is no aggressive bony lesion. CXR Results  (Last 48 hours)    None            Medical Decision Making   I am the first provider for this patient. I reviewed the vital signs, available nursing notes, past medical history, past surgical history, family history and social history. Vital Signs-Reviewed the patient's vital signs. No data found.     Pulse Oximetry Analysis -100 % on RA    Cardiac Monitor:   Rate: 74 bpm  Rhythm: nsr    Records Reviewed: Nursing Notes, Old Medical Records, Previous Radiology Studies and Previous Laboratory Studies    Provider Notes (Medical Decision Making):     DDX:  Gastritis from medication, GI bleed, diverticulitis, diverticulosis    Plan:  Labs, PT, rectal exam, CT scan with IV contrast    Impression:  Known kidney stone, abd pain, reported blood in stool    ED Course:   Initial assessment performed. The patients presenting problems have been discussed, and they are in agreement with the care plan formulated and outlined with them. I have encouraged them to ask questions as they arise throughout their visit. I reviewed our electronic medical record system for any past medical records that were available that may contribute to the patients current condition, the nursing notes and and vital signs from today's visit    Nursing notes will be reviewed as they become available in realtime while the pt has been in the ED. Bernabe Cho MD      TOBACCO COUNSELING:  During evaluation pt reported that they are a current tobacco user. I have spent 3 minutes discussing the medical risks of prolonged smoking habits and advised the patient of the benefits of the cessation of smoking, providing specific suggestions on how to quit. Pt has been counseled and encouraged to quit as soon as possible in order to decrease further risks to their health. Pt has conveyed their understanding of the risks involved should they continue to use tobacco products. Bernabe Cho MD    I personally reviewed pt's imaging. Official read by radiology noted above. Bernabe Cho MD    0700  Progress note:  Pt noted to be feeling better, ready for discharge. Discussed lab and imaging findings with pt, specifically noting no change in previously noted stone, no abnormality for etiology of reported bleeding. Pt will follow up with GI, PCP and urollgy  as instructed. All questions have been answered, pt voiced understanding and agreement with plan. Specific return precautions provided in addition to instructions for pt to return to the ED immediately should sx worsen at any time. Bernabe Cho MD           Critical Care Time:     none      Diagnosis     Clinical Impression:   1. Blood in stool    2.  History of kidney stones PLAN:  1. Discharge Medication List as of 10/28/2019  6:37 AM      START taking these medications    Details   traMADol (ULTRAM) 50 mg tablet Take 1 Tab by mouth every six (6) hours as needed for Pain for up to 3 days. Max Daily Amount: 200 mg., Print, Disp-10 Tab, R-0         CONTINUE these medications which have NOT CHANGED    Details   mirabegron ER (MYRBETRIQ) 25 mg ER tablet Take 25 mg by mouth daily. , Historical Med      pramipexole di-HCl (PRAMIPEXOLE PO) Take  by mouth., Historical Med      TOPIRAMATE (TOPAMAX PO) Take  by mouth., Historical Med      LIRAGLUTIDE (VICTOZA 2-MARILIN SC) by SubCUTAneous route. 1.8 MG QD, Historical Med      metoprolol succinate (TOPROL-XL) 25 mg XL tablet Take 1 Tab by mouth daily. , Normal, Disp-30 Tab, R-3      lisinopril (PRINIVIL, ZESTRIL) 20 mg tablet Historical Med, R-1      atorvastatin (LIPITOR) 20 mg tablet  20 mg = 1 tab each dose, PO, bedtime, 0 Refills, Historical Med      metFORMIN ER (GLUCOPHAGE XR) 500 mg tablet Historical Med, R-0      omeprazole (PRILOSEC) 40 mg capsule Take 1 capsule by mouth two (2) times a day., Print, Disp-60 capsule, R-2      albuterol (PROVENTIL HFA, VENTOLIN HFA) 90 mcg/actuation inhaler Take 2 Puffs by inhalation as needed., Historical Med      ARIPiprazole (ABILIFY) 10 mg tablet Take 10 mg by mouth daily. , Historical Med      citalopram (CELEXA) 40 mg tablet Take 40 mg by mouth daily. , Historical Med      levoFLOXacin (LEVAQUIN) 750 mg tablet Take 1 Tab by mouth daily. , Normal, Disp-7 Tab, R-0      ondansetron (ZOFRAN ODT) 4 mg disintegrating tablet Take 1 Tab by mouth every eight (8) hours as needed for Nausea., Normal, Disp-10 Tab, R-0      CONTOUR NEXT STRIPS strip Historical Med, R-1, [place]      alcohol swabs padm Historical Med, R-1      ANSHUL PEN NEEDLE 32 x 5/32 \" ndle daily. , Historical Med      insulin glargine (LANTUS) 100 unit/mL injection 30 Units by SubCUTAneous route nightly.  Indications: HYPERGLYCEMIA, TYPE 2 DIABETES MELLITUS, Historical Med         STOP taking these medications       ketorolac (TORADOL) 10 mg tablet Comments:   Reason for Stopping:         ketorolac (TORADOL) 10 mg tablet Comments:   Reason for Stoppin.   Follow-up Information     Follow up With Specialties Details Why Contact Info    Delbert Link MD Family Practice Schedule an appointment as soon as possible for a visit in 2 days  [address] [address]  [phone]      [facility]    [address] [facility]ép[facility] [address]         Return to ED if worse     Disposition:    The patient's results have been reviewed with family and/or caregiver. They verbally convey their understanding and agreement of the patient's signs, symptoms, diagnosis, treatment and prognosis and additionally agree to follow up as recommended in the discharge instructions or to return to the Emergency Room should the patient's condition change prior to their follow-up appointment. The family and/or caregiver verbally agrees with the care-plan and all of their questions have been answered. The discharge instructions have also been provided to the them with educational information regarding the patient's diagnosis as well a list of reasons why the patient would want to return to the ER prior to their follow-up appointment should their condition change. Sunil Mg MD            This note will not be viewable in [address]

## 2019-12-26 ENCOUNTER — HOSPITAL ENCOUNTER (EMERGENCY)
Age: 54
Discharge: HOME | End: 2019-12-26
Attending: EMERGENCY MEDICINE | Admitting: EMERGENCY MEDICINE
Payer: MEDICARE

## 2019-12-26 VITALS
BODY MASS INDEX: 44.84 KG/M2 | RESPIRATION RATE: 18 BRPM | DIASTOLIC BLOOD PRESSURE: 59 MMHG | HEART RATE: 84 BPM | WEIGHT: 228.4 LBS | SYSTOLIC BLOOD PRESSURE: 111 MMHG | OXYGEN SATURATION: 100 % | TEMPERATURE: 98 F | HEIGHT: 60 IN

## 2019-12-26 DIAGNOSIS — M54.41 ACUTE RIGHT-SIDED LOW BACK PAIN WITH RIGHT-SIDED SCIATICA: Primary | ICD-10-CM

## 2019-12-26 PROCEDURE — 74011250637 HC RX REV CODE- 250/637: Performed by: PHYSICIAN ASSISTANT

## 2019-12-26 PROCEDURE — 74011250636 HC RX REV CODE- 250/636: Performed by: PHYSICIAN ASSISTANT

## 2019-12-26 PROCEDURE — 96372 THER/PROPH/DIAG INJ SC/IM: CPT

## 2019-12-26 PROCEDURE — 99283 EMERGENCY DEPT VISIT LOW MDM: CPT

## 2019-12-26 RX ORDER — LIDOCAINE 50 MG/G
PATCH TOPICAL
Qty: 10 EACH | Refills: 0 | Status: SHIPPED | OUTPATIENT
Start: 2019-12-26 | End: 2020-03-12

## 2019-12-26 RX ORDER — HYDROCODONE BITARTRATE AND ACETAMINOPHEN 5; 325 MG/1; MG/1
1 TABLET ORAL
Status: COMPLETED | OUTPATIENT
Start: 2019-12-26 | End: 2019-12-26

## 2019-12-26 RX ORDER — NAPROXEN 500 MG/1
500 TABLET ORAL 2 TIMES DAILY WITH MEALS
Qty: 10 TAB | Refills: 0 | Status: SHIPPED | OUTPATIENT
Start: 2019-12-26 | End: 2020-03-12

## 2019-12-26 RX ORDER — METHOCARBAMOL 500 MG/1
500 TABLET, FILM COATED ORAL ONCE
Status: COMPLETED | OUTPATIENT
Start: 2019-12-26 | End: 2019-12-26

## 2019-12-26 RX ORDER — KETOROLAC TROMETHAMINE 30 MG/ML
30 INJECTION, SOLUTION INTRAMUSCULAR; INTRAVENOUS
Status: COMPLETED | OUTPATIENT
Start: 2019-12-26 | End: 2019-12-26

## 2019-12-26 RX ORDER — CYCLOBENZAPRINE HCL 10 MG
10 TABLET ORAL
Qty: 15 TAB | Refills: 0 | Status: SHIPPED | OUTPATIENT
Start: 2019-12-26 | End: 2021-06-24 | Stop reason: SDUPTHER

## 2019-12-26 RX ADMIN — HYDROCODONE BITARTRATE AND ACETAMINOPHEN 1 TABLET: 5; 325 TABLET ORAL at 12:53

## 2019-12-26 RX ADMIN — METHOCARBAMOL TABLETS 500 MG: 500 TABLET, COATED ORAL at 12:53

## 2019-12-26 RX ADMIN — KETOROLAC TROMETHAMINE 30 MG: 30 INJECTION, SOLUTION INTRAMUSCULAR at 12:53

## 2019-12-26 NOTE — ED PROVIDER NOTES
EMERGENCY DEPARTMENT HISTORY AND PHYSICAL EXAM      Date: 12/26/2019  Patient Name: Pam Fournier    History of Presenting Illness     Chief Complaint   Patient presents with    Back Pain     The patient presents to the ED with complaints of lower back pain that radiates down the right leg, denies any injury. States that she has had the pain for three days. History Provided By: Patient    HPI: Pam Fournier, 47 y.o. female with PMHx significant for osteoarthritis severe right hip, total hip replacement left hip, sciatica right side. Patient presents to the emergency department with complaints of right lower back pain that she states radiates down her right leg. She has been having exacerbation of this for approximately 3 days. She denies any falls, trauma, saddle anesthesia, urinary or stool incontinence, bilateral leg paresthesias, has taken 1 dose of ibuprofen for pain without any resolution. She denies any abdominal pains or flank pains or  symptoms as well or any fevers or IV drug use. Pains are approximately 10 out of 10 worse with movements and feels like an aching sensation radiating down her right leg. Please note for examination and HPI were completed I did introduce myself as the physician assistant. Please note that this dictation was completed with Soko, the computer voice recognition software. Quite often unanticipated grammatical, syntax, homophones, and other interpretive errors are inadvertently transcribed by the computer software. Please disregard these errors. Please excuse any errors that have escaped final proofreading. For further clarification on any chart please contact myself. Thank you. There are no other complaints, changes, or physical findings at this time. PCP: Esther Blackburn MD    No current facility-administered medications on file prior to encounter.       Current Outpatient Medications on File Prior to Encounter   Medication Sig Dispense Refill    mirabegron ER (MYRBETRIQ) 25 mg ER tablet Take 25 mg by mouth daily.  pramipexole di-HCl (PRAMIPEXOLE PO) Take  by mouth.  ondansetron (ZOFRAN ODT) 4 mg disintegrating tablet Take 1 Tab by mouth every eight (8) hours as needed for Nausea. 10 Tab 0    TOPIRAMATE (TOPAMAX PO) Take  by mouth.  LIRAGLUTIDE (VICTOZA 2-MARILIN SC) by SubCUTAneous route. 1.8 MG QD      metoprolol succinate (TOPROL-XL) 25 mg XL tablet Take 1 Tab by mouth daily. 30 Tab 3    lisinopril (PRINIVIL, ZESTRIL) 20 mg tablet   1    atorvastatin (LIPITOR) 20 mg tablet  20 mg = 1 tab each dose, PO, bedtime, 0 Refills      metFORMIN ER (GLUCOPHAGE XR) 500 mg tablet   0    CONTOUR NEXT STRIPS strip   1    alcohol swabs padm   1    omeprazole (PRILOSEC) 40 mg capsule Take 1 capsule by mouth two (2) times a day. 60 capsule 2    albuterol (PROVENTIL HFA, VENTOLIN HFA) 90 mcg/actuation inhaler Take 2 Puffs by inhalation as needed.  ANSHUL PEN NEEDLE 32 x 5/32 \" ndle daily.  insulin glargine (LANTUS) 100 unit/mL injection 30 Units by SubCUTAneous route nightly. Indications: HYPERGLYCEMIA, TYPE 2 DIABETES MELLITUS      ARIPiprazole (ABILIFY) 10 mg tablet Take 10 mg by mouth daily.  citalopram (CELEXA) 40 mg tablet Take 40 mg by mouth daily.  [DISCONTINUED] levoFLOXacin (LEVAQUIN) 750 mg tablet Take 1 Tab by mouth daily.  7 Tab 0       Past History     Past Medical History:  Past Medical History:   Diagnosis Date    Chronic pelvic pain in female 7/9/2014    Depression     Diabetes ([facility] [address]     Hypertension     Obesity (BMI 35.0-39.9 without comorbidity)     SHAGUFTA on CPAP 3/9/2017    Posttraumatic stress disorder     Psychiatric disorder     depression    Recurrent major depression ([address] 7/27/2010    S/P ventral herniorrhaphy 2/1/2018    Suicidal thoughts     Thyroid nodule 10/1/2015    Unspecified sleep apnea     uses cpap       Past Surgical History:  Past Surgical History:   Procedure Laterality Date  ABDOMEN SURGERY PROC UNLISTED      cholecystectomy    HX  SECTION      HX  SECTION      HX HEENT      head and neck surgery     HX HERNIA REPAIR  2018    incarcerated ventral hernia repair with mesh    HX HYSTERECTOMY      HX ORTHOPAEDIC      L ankle surgery     HX TUBAL LIGATION  1998    PPBTL       Family History:  Family History   Problem Relation Age of Onset    Cancer Mother 61        Breast cancer   [facility] Breast Cancer Mother 48    Heart Disease Father 50        M. I.   [facility] Asthma Sister     Heart Disease Brother     Hypertension Brother     Hypertension Brother     Diabetes Brother     Lung Disease Brother         COPD       Social History:  Social History     Tobacco Use    Smoking status: Former Smoker     Packs/day: 0.50     Years: 20.00     Pack years: 10.00    Smokeless tobacco: Never Used   Substance Use Topics    Alcohol use: No    Drug use: No       Allergies: Allergies   Allergen Reactions    Zantac [Ranitidine Hcl] Hives         Review of Systems   Review of Systems   All other systems reviewed and are negative. Physical Exam   Physical Exam  Vitals signs and nursing note reviewed. Constitutional:       Appearance: She is well-developed. HENT:      Head: Normocephalic and atraumatic. Eyes:      Conjunctiva/sclera: Conjunctivae normal.      Pupils: Pupils are equal, round, and reactive to light. Neck:      Musculoskeletal: Normal range of motion and neck supple. Thyroid: No thyromegaly. Cardiovascular:      Rate and Rhythm: Normal rate and regular rhythm. Heart sounds: Normal heart sounds. No murmur. Pulmonary:      Effort: Pulmonary effort is normal. No respiratory distress. Breath sounds: Normal breath sounds. No stridor. No wheezing. Abdominal:      General: Bowel sounds are normal.      Palpations: Abdomen is soft. Tenderness: There is no tenderness. Musculoskeletal: Normal range of motion. General: No tenderness. Comments: Positive right-sided straight leg raise test appreciated at 45 degrees. No calf tenderness appreciated bilaterally. Palpable dorsalis pedis pulse equal bilaterally. Slight edema appreciated over the dorsal foot area bilaterally 1+ nonpitting. Cap refill all toes less than 2 seconds. .  Patient able flex extend needs and also walk with the assistance of her cane as she normally uses without any abnormalities noted in her gait. DTRs 2+ bilaterally patellar reflex. Lymphadenopathy:      Cervical: No cervical adenopathy. Skin:     General: Skin is warm. Neurological:      Mental Status: She is alert and oriented to person, place, and time. Deep Tendon Reflexes: Reflexes are normal and symmetric. Psychiatric:         Judgment: Judgment normal.         Diagnostic Study Results     Labs -   No results found for this or any previous visit (from the past 12 hour(s)). Radiologic Studies -   No orders to display     CT Results  (Last 48 hours)    None        CXR Results  (Last 48 hours)    None            Medical Decision Making   I am the first provider for this patient. I reviewed the vital signs, available nursing notes, past medical history, past surgical history, family history and social history. Vital Signs-Reviewed the patient's vital signs. Patient Vitals for the past 12 hrs:   Temp Pulse Resp BP SpO2   12/26/19 1020 98 °F (36.7 °C) 84 18 111/59 100 %       Records Reviewed: Nursing Notes    Provider Notes (Medical Decision Making): At this time I did advise patient to follow-up with primary care specialist and at this time low clinical suspicion for any cauda equina syndrome, discitis, spinal epidural abscess. She did have hip surgery 1 year ago for left hip and is supposed to have surgery again on her right hip which may be causing exacerbation of her pain abduction adduction of right hip does reveal some pains noted.   Low clinical suspicion for any DVT as she does have bilateral dorsal foot swelling however no Homans sign no palpable cords or masses and no personal familial history of any coagulopathy, patient also does not have any hemoptysis or pleuritic chest pain suggestive of PE. Fady Fernandes She was advised to return for any new or worsening complications at this time discharged stable condition. ED Course:   Initial assessment performed. The patients presenting problems have been discussed, and they are in agreement with the care plan formulated and outlined with them. I have encouraged them to ask questions as they arise throughout their visit. Critical Care Time: None    Disposition:  dispo for home     PLAN:  1. Current Discharge Medication List      START taking these medications    Details   naproxen (NAPROSYN) 500 mg tablet Take 1 Tab by mouth two (2) times daily (with meals). Qty: 10 Tab, Refills: 0      lidocaine (LIDODERM) 5 % Apply patch to the affected area for 12 hours a day and remove for 12 hours a day. Qty: 10 Each, Refills: 0      cyclobenzaprine (FLEXERIL) 10 mg tablet Take 1 Tab by mouth three (3) times daily as needed for Muscle Spasm(s). Qty: 15 Tab, Refills: 0         CONTINUE these medications which have NOT CHANGED    Details   mirabegron ER (MYRBETRIQ) 25 mg ER tablet Take 25 mg by mouth daily. pramipexole di-HCl (PRAMIPEXOLE PO) Take  by mouth. ondansetron (ZOFRAN ODT) 4 mg disintegrating tablet Take 1 Tab by mouth every eight (8) hours as needed for Nausea. Qty: 10 Tab, Refills: 0      TOPIRAMATE (TOPAMAX PO) Take  by mouth. LIRAGLUTIDE (VICTOZA 2-MARILIN SC) by SubCUTAneous route. 1.8 MG QD      metoprolol succinate (TOPROL-XL) 25 mg XL tablet Take 1 Tab by mouth daily.   Qty: 30 Tab, Refills: 3    Associated Diagnoses: Hypertrophic cardiomyopathy ([address]      lisinopril (PRINIVIL, ZESTRIL) 20 mg tablet Refills: 1      atorvastatin (LIPITOR) 20 mg tablet  20 mg = 1 tab each dose, PO, bedtime, 0 Refills metFORMIN ER (GLUCOPHAGE XR) 500 mg tablet Refills: 0      CONTOUR NEXT STRIPS strip Refills: 1      alcohol swabs padm Refills: 1      omeprazole (PRILOSEC) 40 mg capsule Take 1 capsule by mouth two (2) times a day. Qty: 60 capsule, Refills: 2      albuterol (PROVENTIL HFA, VENTOLIN HFA) 90 mcg/actuation inhaler Take 2 Puffs by inhalation as needed. ANSHUL PEN NEEDLE 32 x 5/32 \" ndle daily. insulin glargine (LANTUS) 100 unit/mL injection 30 Units by SubCUTAneous route nightly. Indications: HYPERGLYCEMIA, TYPE 2 DIABETES MELLITUS      ARIPiprazole (ABILIFY) 10 mg tablet Take 10 mg by mouth daily. citalopram (CELEXA) 40 mg tablet Take 40 mg by mouth daily. 2.   Follow-up Information     Follow up With Specialties Details Why Contact Kierra Minaya, [address]  [address] [address] [phone]      \[facility]\"" EMERGENCY DEPT Emergency Medicine  If symptoms worsen [address] [place] [address] Drive  [address] [facility]  [phone]        Return to ED if worse     Diagnosis     Clinical Impression:   1. Acute right-sided low back pain with right-sided sciatica          Please note that this dictation was completed with Rawb[employer], the computer voice recognition software. Quite often unanticipated grammatical, syntax, homophones, and other interpretive errors are inadvertently transcribed by the computer software. Please disregards these errors. Please excuse any errors that have escaped final proofreading. This note will not be viewable in [address]

## 2019-12-26 NOTE — ED NOTES
DALE Roberts at bedside to provide discharge paperwork. Vital signs stable. Pt in no apparent distress at this time. Mental status at baseline. Ambulatory to waiting room with steady gate, discharge paperwork in hand. Patient and or family acknowledged and signed discharge instructions that were created/provided by the Physician. Signed discharge form with patient label placed in scan box. Accompanied by family to drive pt home.

## 2019-12-26 NOTE — DISCHARGE INSTRUCTIONS
Patient Education        Back Pain: Care Instructions  Your Care Instructions    Back pain has many possible causes. It is often related to problems with muscles and ligaments of the back. It may also be related to problems with the nerves, discs, or bones of the back. Moving, lifting, standing, sitting, or sleeping in an awkward way can strain the back. Sometimes you don't notice the injury until later. Arthritis is another common cause of back pain. Although it may hurt a lot, back pain usually improves on its own within several weeks. Most people recover in 12 weeks or less. Using good home treatment and being careful not to stress your back can help you feel better sooner. Follow-up care is a key part of your treatment and safety. Be sure to make and go to all appointments, and call your doctor if you are having problems. It's also a good idea to know your test results and keep a list of the medicines you take. How can you care for yourself at home? · Sit or lie in positions that are most comfortable and reduce your pain. Try one of these positions when you lie down:  ? Lie on your back with your knees bent and supported by large pillows. ? Lie on the floor with your legs on the seat of a sofa or chair. ? Lie on your side with your knees and hips bent and a pillow between your legs. ? Lie on your stomach if it does not make pain worse. · Do not sit up in bed, and avoid soft couches and twisted positions. Bed rest can help relieve pain at first, but it delays healing. Avoid bed rest after the first day of back pain. · Change positions every 30 minutes. If you must sit for long periods of time, take breaks from sitting. Get up and walk around, or lie in a comfortable position. · Try using a heating pad on a low or medium setting for 15 to 20 minutes every 2 or 3 hours. Try a warm shower in place of one session with the heating pad. · You can also try an ice pack for 10 to 15 minutes every 2 to 3 hours. Put a thin cloth between the ice pack and your skin. · Take pain medicines exactly as directed. ? If the doctor gave you a prescription medicine for pain, take it as prescribed. ? If you are not taking a prescription pain medicine, ask your doctor if you can take an over-the-counter medicine. · Take short walks several times a day. You can start with 5 to 10 minutes, 3 or 4 times a day, and work up to longer walks. Walk on level surfaces and avoid hills and stairs until your back is better. · Return to work and other activities as soon as you can. Continued rest without activity is usually not good for your back. · To prevent future back pain, do exercises to stretch and strengthen your back and stomach. Learn how to use good posture, safe lifting techniques, and proper body mechanics. When should you call for help? Call your doctor now or seek immediate medical care if:    · You have new or worsening numbness in your legs.     · You have new or worsening weakness in your legs. (This could make it hard to stand up.)     · You lose control of your bladder or bowels.    Watch closely for changes in your health, and be sure to contact your doctor if:    · You have a fever, lose weight, or don't feel well.     · You do not get better as expected. Where can you learn more? Go to http://tin-viv.info/. Enter D907 in the search box to learn more about \"Back Pain: Care Instructions. \"  Current as of: June 26, 2019  Content Version: 12.2  © 4018-0127 [employer]. Care instructions adapted under license by [employer] (which disclaims liability or warranty for this information). If you have questions about a medical condition or this instruction, always ask your healthcare professional. [place] [address] any warranty or liability for your use of this information.

## 2020-01-10 ENCOUNTER — OFFICE VISIT (OUTPATIENT)
Dept: INTERNAL MEDICINE CLINIC | Age: 55
End: 2020-01-10

## 2020-01-10 VITALS
OXYGEN SATURATION: 97 % | HEIGHT: 60 IN | TEMPERATURE: 97.9 F | SYSTOLIC BLOOD PRESSURE: 110 MMHG | RESPIRATION RATE: 20 BRPM | BODY MASS INDEX: 44.76 KG/M2 | DIASTOLIC BLOOD PRESSURE: 60 MMHG | HEART RATE: 95 BPM | WEIGHT: 228 LBS

## 2020-01-10 DIAGNOSIS — F32.A DEPRESSION, UNSPECIFIED DEPRESSION TYPE: ICD-10-CM

## 2020-01-10 DIAGNOSIS — E78.5 HYPERLIPIDEMIA, UNSPECIFIED HYPERLIPIDEMIA TYPE: ICD-10-CM

## 2020-01-10 DIAGNOSIS — G89.29 CHRONIC BACK PAIN, UNSPECIFIED BACK LOCATION, UNSPECIFIED BACK PAIN LATERALITY: ICD-10-CM

## 2020-01-10 DIAGNOSIS — M54.9 CHRONIC BACK PAIN, UNSPECIFIED BACK LOCATION, UNSPECIFIED BACK PAIN LATERALITY: ICD-10-CM

## 2020-01-10 DIAGNOSIS — M79.604 PAIN OF RIGHT LOWER EXTREMITY: ICD-10-CM

## 2020-01-10 DIAGNOSIS — Z76.89 ESTABLISHING CARE WITH NEW DOCTOR, ENCOUNTER FOR: ICD-10-CM

## 2020-01-10 DIAGNOSIS — E11.9 CONTROLLED TYPE 2 DIABETES MELLITUS WITHOUT COMPLICATION, UNSPECIFIED WHETHER LONG TERM INSULIN USE (HCC): ICD-10-CM

## 2020-01-10 DIAGNOSIS — M25.551 RIGHT HIP PAIN: Primary | ICD-10-CM

## 2020-01-10 DIAGNOSIS — D64.9 ANEMIA, UNSPECIFIED TYPE: ICD-10-CM

## 2020-01-10 LAB
BILIRUB UR QL STRIP: NEGATIVE
CHOLEST SERPL-MCNC: 124 MG/DL
GLUCOSE POC: 83 MG/DL
GLUCOSE UR-MCNC: NEGATIVE MG/DL
HBA1C MFR BLD HPLC: 5.4 %
HDLC SERPL-MCNC: 46 MG/DL
KETONES P FAST UR STRIP-MCNC: NEGATIVE MG/DL
LDL CHOLESTEROL POC: 41 MG/DL
NON-HDL GOAL (POC): 78
PH UR STRIP: 7 [PH] (ref 4.6–8)
PROT UR QL STRIP: NEGATIVE
SP GR UR STRIP: 1.02 (ref 1–1.03)
TCHOL/HDL RATIO (POC): 2.7
TRIGL SERPL-MCNC: 186 MG/DL
UA UROBILINOGEN AMB POC: NORMAL (ref 0.2–1)
URINALYSIS CLARITY POC: CLEAR
URINALYSIS COLOR POC: YELLOW
URINE BLOOD POC: NEGATIVE
URINE LEUKOCYTES POC: NEGATIVE
URINE NITRITES POC: NEGATIVE

## 2020-01-10 RX ORDER — OXYCODONE AND ACETAMINOPHEN 5; 325 MG/1; MG/1
TABLET ORAL
COMMUNITY
Start: 2020-01-03 | End: 2020-01-10

## 2020-01-10 RX ORDER — CYCLOBENZAPRINE HCL 10 MG
TABLET ORAL
COMMUNITY
Start: 2019-12-25 | End: 2020-03-12

## 2020-01-10 RX ORDER — OXYBUTYNIN CHLORIDE 5 MG/1
TABLET ORAL
COMMUNITY
Start: 2019-11-12 | End: 2020-03-12

## 2020-01-10 RX ORDER — LEVOFLOXACIN 750 MG/1
TABLET ORAL
COMMUNITY
Start: 2019-10-23 | End: 2020-01-10

## 2020-01-10 RX ORDER — CEPHALEXIN 500 MG/1
CAPSULE ORAL
COMMUNITY
Start: 2019-10-23 | End: 2020-01-10

## 2020-01-10 RX ORDER — UREA 10 %
220 LOTION (ML) TOPICAL DAILY
COMMUNITY
Start: 2020-01-10 | End: 2020-03-12

## 2020-01-10 RX ORDER — AMOXICILLIN AND CLAVULANATE POTASSIUM 875; 125 MG/1; MG/1
TABLET, FILM COATED ORAL
COMMUNITY
Start: 2019-10-30 | End: 2020-01-10

## 2020-01-10 RX ORDER — BLOOD SUGAR DIAGNOSTIC
STRIP MISCELLANEOUS
Status: ON HOLD | COMMUNITY
Start: 2020-01-06 | End: 2020-04-20 | Stop reason: CLARIF

## 2020-01-10 RX ORDER — MIRABEGRON 25 MG/1
TABLET, FILM COATED, EXTENDED RELEASE ORAL
COMMUNITY
Start: 2020-01-06 | End: 2020-03-12 | Stop reason: SDUPTHER

## 2020-01-10 RX ORDER — TAMSULOSIN HYDROCHLORIDE 0.4 MG/1
CAPSULE ORAL
COMMUNITY
Start: 2019-11-13 | End: 2020-03-12

## 2020-01-10 RX ORDER — NAPROXEN 500 MG/1
TABLET ORAL
COMMUNITY
Start: 2019-12-26 | End: 2020-01-10

## 2020-01-10 RX ORDER — METFORMIN HYDROCHLORIDE 500 MG/1
TABLET, EXTENDED RELEASE ORAL
COMMUNITY
Start: 2019-11-22 | End: 2020-03-12 | Stop reason: SDUPTHER

## 2020-01-10 RX ORDER — SULFAMETHOXAZOLE AND TRIMETHOPRIM 800; 160 MG/1; MG/1
TABLET ORAL
COMMUNITY
Start: 2019-11-06 | End: 2020-01-10

## 2020-01-10 RX ORDER — HYDROCODONE BITARTRATE AND ACETAMINOPHEN 7.5; 325 MG/1; MG/1
TABLET ORAL
COMMUNITY
Start: 2019-10-23 | End: 2020-01-10

## 2020-01-10 RX ORDER — ONDANSETRON 4 MG/1
TABLET, FILM COATED ORAL
COMMUNITY
Start: 2019-11-12 | End: 2020-01-10

## 2020-01-10 RX ORDER — DICLOFENAC SODIUM 75 MG/1
TABLET, DELAYED RELEASE ORAL
COMMUNITY
Start: 2020-01-07 | End: 2020-03-12

## 2020-01-10 RX ORDER — CITALOPRAM 40 MG/1
TABLET, FILM COATED ORAL
COMMUNITY
Start: 2020-01-06 | End: 2020-03-12

## 2020-01-10 RX ORDER — ALBUTEROL SULFATE 90 UG/1
AEROSOL, METERED RESPIRATORY (INHALATION)
Status: ON HOLD | COMMUNITY
Start: 2019-12-30 | End: 2020-04-20 | Stop reason: CLARIF

## 2020-01-10 RX ORDER — METOPROLOL SUCCINATE 25 MG/1
TABLET, EXTENDED RELEASE ORAL
COMMUNITY
Start: 2019-12-12 | End: 2020-03-12 | Stop reason: SDUPTHER

## 2020-01-10 RX ORDER — FLUTICASONE PROPIONATE AND SALMETEROL 50; 250 UG/1; UG/1
1 POWDER RESPIRATORY (INHALATION) 2 TIMES DAILY
COMMUNITY
Start: 2019-12-11 | End: 2021-06-24 | Stop reason: ALTCHOICE

## 2020-01-10 RX ORDER — PRAMIPEXOLE DIHYDROCHLORIDE 0.25 MG/1
TABLET ORAL
COMMUNITY
Start: 2019-12-16 | End: 2021-01-24

## 2020-01-10 RX ORDER — MELOXICAM 15 MG/1
TABLET ORAL
COMMUNITY
Start: 2019-11-21 | End: 2020-01-10

## 2020-01-10 RX ORDER — ISOPROPYL ALCOHOL 70 ML/100ML
SWAB TOPICAL
Status: ON HOLD | COMMUNITY
Start: 2019-11-24 | End: 2020-04-20 | Stop reason: CLARIF

## 2020-01-10 RX ORDER — TOPIRAMATE 50 MG/1
50 TABLET, FILM COATED ORAL
COMMUNITY
Start: 2020-01-06

## 2020-01-10 RX ORDER — ATORVASTATIN CALCIUM 20 MG/1
TABLET, FILM COATED ORAL
COMMUNITY
Start: 2019-11-22 | End: 2020-03-12 | Stop reason: SDUPTHER

## 2020-01-10 RX ORDER — PHENTERMINE HYDROCHLORIDE 37.5 MG/1
CAPSULE ORAL
COMMUNITY
Start: 2019-12-31 | End: 2021-08-12 | Stop reason: ALTCHOICE

## 2020-01-10 RX ORDER — HYDROCODONE BITARTRATE AND ACETAMINOPHEN 5; 325 MG/1; MG/1
TABLET ORAL
COMMUNITY
Start: 2019-11-27 | End: 2020-01-10

## 2020-01-10 RX ORDER — ASCORBIC ACID 250 MG
250 TABLET ORAL
COMMUNITY
Start: 2020-01-10 | End: 2020-03-12

## 2020-01-10 RX ORDER — FLUTICASONE FUROATE AND VILANTEROL TRIFENATATE 200; 25 UG/1; UG/1
POWDER RESPIRATORY (INHALATION)
COMMUNITY
Start: 2019-11-07 | End: 2021-01-25 | Stop reason: CLARIF

## 2020-01-10 RX ORDER — LISINOPRIL 10 MG/1
10 TABLET ORAL DAILY
COMMUNITY
Start: 2020-01-04 | End: 2021-08-12 | Stop reason: ALTCHOICE

## 2020-01-10 RX ORDER — OMEPRAZOLE 40 MG/1
CAPSULE, DELAYED RELEASE ORAL
COMMUNITY
Start: 2020-01-04 | End: 2020-03-12 | Stop reason: SDUPTHER

## 2020-01-10 RX ORDER — LIRAGLUTIDE 6 MG/ML
INJECTION SUBCUTANEOUS
COMMUNITY
Start: 2020-01-06 | End: 2021-01-24

## 2020-01-10 RX ORDER — ARIPIPRAZOLE 10 MG/1
TABLET ORAL
COMMUNITY
Start: 2020-01-06 | End: 2020-03-12 | Stop reason: SDUPTHER

## 2020-01-10 RX ORDER — GABAPENTIN 300 MG/1
CAPSULE ORAL
COMMUNITY
Start: 2020-01-03 | End: 2020-03-12

## 2020-01-10 RX ORDER — BUPROPION HYDROCHLORIDE 150 MG/1
300 TABLET ORAL DAILY
COMMUNITY
Start: 2019-12-31

## 2020-01-10 NOTE — PROGRESS NOTES
CC: Establish Care    HPI:     Robin Jalloh is a 47 y.o. female who presents with a chief complaint of Establish Care   and with other medical problems:    CHRONIC PAIN - BACK  PAIN - RIGHT HIP, RLE  - persistent pain right hip and leg x 1 month;   - right hip pain began after surgery for left hip pain  THR  - needs hip replacement; has RLE radiculopathy  ; RLE gave way, fell; taken to ED at [facility] by ambulance  - ruled out stroke, blood clot; sent home on meds  - PT will be coming in home 3 x/wk  - needs to find pain management    DEPRESSION  - no SI/HI  - since age 28, feeling better since change in meds  - hospitalized 7-8 times; last ~2 yrs ago  - psychiatrist: Chavez Arciniega at [facility]    OTHER  - Controlled DM2  - Hyperlipidemia  - on cpap  - had heart test, stress, ekg before kidney stone surgery 11/2019      Allergies   Allergen Reactions    Zantac [Ranitidine Hcl] Hives      Current Outpatient Medications on File Prior to Visit   Medication Sig Dispense Refill    topiramate (TOPAMAX) 50 mg tablet       oxybutynin (DITROPAN) 5 mg tablet       phentermine 37.5 mg capsule take 1 capsule by mouth every morning      pramipexole (MIRAPEX) 0.25 mg tablet       tamsulosin (FLOMAX) 0.4 mg capsule       omeprazole (PRILOSEC) 40 mg capsule       metoprolol succinate (TOPROL-XL) 25 mg XL tablet       MYRBETRIQ 25 mg ER tablet       lisinopril (PRINIVIL, ZESTRIL) 10 mg tablet       VICTOZA 3-MARILIN 0.6 mg/0.1 mL (18 mg/3 mL) pnij       gabapentin (NEURONTIN) 300 mg capsule       BREO ELLIPTA 200-25 mcg/dose inhaler       ADVAIR DISKUS 250-50 mcg/dose diskus inhaler       diclofenac EC (VOLTAREN) 75 mg EC tablet       buPROPion XL (WELLBUTRIN XL) 150 mg tablet       CONTOUR NEXT TEST STRIPS strip       atorvastatin (LIPITOR) 20 mg tablet       ARIPiprazole (ABILIFY) 10 mg tablet       VENTOLIN HFA 90 mcg/actuation inhaler       alcohol swabs padm       metFORMIN ER (GLUCOPHAGE XR) 500 mg tablet  oxyCODONE-acetaminophen (PERCOCET) 5-325 mg per tablet       trimethoprim-sulfamethoxazole (BACTRIM DS, SEPTRA DS) 160-800 mg per tablet       ondansetron hcl (ZOFRAN) 4 mg tablet       naproxen (NAPROSYN) 500 mg tablet       HYDROcodone-acetaminophen (NORCO) 7.5-325 mg per tablet       levoFLOXacin (LEVAQUIN) 750 mg tablet       meloxicam (MOBIC) 15 mg tablet       HYDROcodone-acetaminophen (NORCO) 5-325 mg per tablet       cyclobenzaprine (FLEXERIL) 10 mg tablet       citalopram (CELEXA) 40 mg tablet       amoxicillin-clavulanate (AUGMENTIN) 875-125 mg per tablet       cephALEXin (KEFLEX) 500 mg capsule        No current facility-administered medications on file prior to visit. ASSESSMENT  TREATMENT  PLAN:    1. Hyperlipidemia, unspecified hyperlipidemia type  2. Controlled type 2 diabetes mellitus without complication, unspecified whether long term insulin use (HCC)  - AMB POC LIPID PROFILE  - AMB POC HEMOGLOBIN A1C  - AMB POC GLUCOSE BLOOD, BY GLUCOSE MONITORING DEVICE  - GENERAL HEALTH PANEL  - AMB POC URINALYSIS DIP STICK AUTO W/O MICRO    3. Depression, unspecified depression type  Controlled  - contin meds, counseling    4. Chronic back pain, unspecified back location, unspecified back pain laterality  5. Right hip pain  6. Pain of right lower extremity  Uncontrolled  - REFERRAL TO PAIN MANAGEMENT    7. Anemia, unspecified type  Status unknown  - GENERAL HEALTH PANEL  - iron,carbonyl-vitamin C (VITRON-C) 65 mg iron- 125 mg TbEC; Take 1 Tab by mouth daily. - ascorbic acid, vitamin C, (VITAMIN C) 250 mg tablet; Take 1 Tab by mouth. Indications: iron deficiency  - zinc sulfate 220 mg tablet; Take 1 Tab by mouth daily.     8. Establishing care with new doctor, encounter for  - AMB POC LIPID PROFILE  - AMB POC HEMOGLOBIN A1C  - AMB POC GLUCOSE BLOOD, BY GLUCOSE MONITORING DEVICE  - GENERAL HEALTH PANEL  - AMB POC URINALYSIS DIP STICK AUTO W/O MICRO    Patient Instructions:  - BMI, Starting Wt Loss; Quit Smoking Benefits      EXAM:    CONSTITUTIONAL:     Vitals:    01/10/20 1212   BP: 110/60   Pulse: 95   Resp: 20   Temp: 97.9 °F (36.6 °C)   TempSrc: Oral   SpO2: 97%   Weight: 228 lb (103.4 kg)   Height: 5' (1.524 m)   PainSc:  10 - Worst pain ever   PainLoc: Hip   :3  Weight Metrics 1/10/2020   Weight 228 lb   BMI 44.53 kg/m2     General:  WD morbidly obese appropriately groomed  female in NAD. EYES:   POSITIVE:  none. Except for Positive findings listed, this system was WNL. My WNL exam this visit:   Conjunctivae & lids w/o erythema or discharge. EARS, NOSE, MOUTH & THROAT:   POSITIVE:  none. Except for Positive findings listed, this system was WNL. My WNL exam this visit:  External ears & nose WNL w/o scars, lesions or masses. HEAD & NECK:   POSITIVE:  none. Except for Positive findings listed, this system was WNL. My WNL exam this visit:  Supple. Atraumatic, normocephalic. Symmetrical     RESPIRATORY:   POSITIVE:  none. Except for Positive findings listed, this system was WNL. My WNL exam this visit:  Effort WNL; no intercostal retractions or accessory muscle use; diaphragmatic movement WNL.  Breath sounds: good air entry, no adventitious sounds; no rales, wheezes, rhonchi or rubs.  No dullness, flatness or hyperresonance. CARDIOVASCULAR:  POSITIVE:  Mild edema LEs. Except for Positive findings listed, this system was WNL. My WNL exam this visit:    Heart - w/o thrills. PMI non-displaced.  S1, S2 normal rate, regular rhythm. No murmurs, gallops, clicks or rubs. Vascular  carotid pulses WNL; no bruits  abdominal aorta size WNL; no bruits  pedal pulses WNL  Extremities negative for edema or varicosities. GASTROINTESTINAL (Abdomen):   POSITIVE:  None. Except for Positive findings listed, this system was WNL. My WNL exam this visit:  Flat; NABS w/o masses or tenderness  Liver 6 cm in RMCL. Liver & spleen w/o organomegaly.    No hernias palpable. GENITOURINARY (Urinary Only):  POSITIVE:  none. Except for Positive findings listed, this system was WNL. My WNL exam this visit:    No CVA or suprapubic tenderness. MUSCULOSKELETAL:   POSITIVE: Using wheeled walker. Tender base C spine and right low & mid back. Weak right hip flexor 4/5 and right hip extension 5/5 w/ moderate weakness. Except for Positive findings listed, this system was WNL. My WNL exam this visit:    Gait & station WNL. Joints, Bones and Muscles of   Head & Neck:  Spine, Ribs and Pelvis:  RUE:  LUE:  RLE:  LLE:   - No misalignment, asymmetry, crepitation, defects, tenderness, masses or effusions.   - ROM full w/o pain, crepitation or contracture. - Joints stable w/o dislocation (luxation), subluxation or laxity.  - Muscle strength 5/5, tone WNL w/o flaccidity, cog wheel or spasticity. No atrophy or abnormal movements. NEUROLOGIC:   POSITIVE:  No focal deficits except right hip weakness. Except for Positive findings listed, this system was WNL. My WNL exam this visit:   Cranial nerves 2-12 intact.  DTRs: Biceps, patellar, Achilles symmetrical and WNL. Babinski negative bilaterally. PSYCHIATRIC:  POSITIVE findings:  None. No SI/HI. Except for Positive findings listed, this system was WNL. My WNL exam:  · Speech: rate, volume, articulation, coherence, and spontaneity WNL w/o perseveration, paucity of language or pressured speech  · Thought processes: Rate of thoughts, content of thoughts WNL; logical, not tangential.   · Associations: Intact; not loose, tangential, circumstantial.   · Abnormal or psychotic thoughts not currently present. No homicidal or suicidal ideation. No hallucinations, delusions, preoccupation with violence, obsessions   · Judgment concerning everyday activities & social situations, good. Insight concerning psychiatric condition, good  Mental status:  · Oriented to person, place and time.   · Recent and remote memory good for recall of events, times and discussions during visit. · Attention span and concentration good. · Fund of knowledge: vocabulary, good. · Mood and affect: Not depressed or anxious w/o agitation, anger, aggression, hypomania or lability. MARICRUZ No flowsheet data found. PHQ   3 most recent PHQ Screens 1/10/2020   Little interest or pleasure in doing things Not at all   Feeling down, depressed, irritable, or hopeless Not at all   Total Score PHQ 2 0       HISTORY - ROS  PAST  FAMILY  SURGICAL  SOCIAL  with PROBLEM LIST:    ROS - Review of Systems    EXCEPT FOR POSITIVES LISTED, the Review of Systems below was negative or within normal limits  CONSTITUTIONAL:  Positive for: none. Fever  chills  night sweats  fatigue  malaise  weakness  anorexia  wt loss or gain  EYES:  Positive for: none. Vision loss, blurred, double  color blind  discharge  irritation  glasses/contacts  eye exam:___/___ /____  H&NENT:  Positive for: none. Head trauma  deafness  tinnitus  vertigo  ear discharge or pain  rhinitis  sinusitis  nasal drainage or congestion  epistaxis  sore mouth / tongue / throat  tonsillitis  voice changes  hoarseness  bad teeth or gums  RESPIRATORY:  Positive for:  CPAP  SOB  wheezing  cough  sputum  hemoptysis  asthma / COPD  pneumonia  TB/TB exposure  pleuritis  CARDIOVASCULAR:  Positivefor: BARNES, edema. Chest pain  diaphoresis  BARNES  tachycardia  palpitations  LE edema  orthopnea  PND  lightheaded  syncope  GASTROINTESTINAL:  Positive for: none. Nausea  vomiting  constipation  diarrhea  abdominal pain  hematochezia  melena  hematemesis  dysphagia  indigestion  acid reflux/GERD  hepatitis  liver problems  jaundice  GENITOURINARY:  Positive for: none. Frequency  dysuria  hematuria  urine odor  urethral/vaginal discharge  urgency  hesitancy  incomplete emptying  weak stream  SKINBREASTS:  Positive for: none.   Rash  itching  whelps  bruises  sores breast lumps  MUSCULOSKELETAL:  Positive for: Joint pain, swelling & stiffness. Myalgias, sometimesweak. Low back pain - center & right side. Neck pain, s/p surgery C4-7 w/ UE radiculopathy, now also in T12 per pt. Joint pain, stiffness, effusion, limited movement  muscle pain, weakness  back neck pain  fracture(s)  NEUROLOGICAL:  Positive for: none. Headaches  migraines  dizzy, lightheaded  vertigo  seizure  memory loss  gait problems   HEMELYMPH:  Positive for: none. Bruise, bleed easily  bleeding gums  lymphadenopathy  ENDOCRINE:  Positive for: nonec. Polyuria  polydipsia  polyphagia  heat or cold intolerance  goiter  thyroid problems  PSYCHIATRIC:  Posiive for: Depressed. Anxiety  depression  suicidal or homicidal thoughts  mood swings    PFSSH:  Active Ambulatory Problems     Diagnosis Date Noted    No Active Ambulatory Problems     Resolved Ambulatory Problems     Diagnosis Date Noted    No Resolved Ambulatory Problems     No Additional Past Medical History     No past surgical history on file. Social History     Tobacco Use    Smoking status: Former Smoker     Packs/day: 2.00     Years: 30.00     Pack years: 60.00     Types: Cigarettes     Last attempt to quit: 2019     Years since quittin.4    Smokeless tobacco: Never Used   Substance Use Topics    Alcohol use: Never     Frequency: Never     Binge frequency: Never     History reviewed. No pertinent family history. RESULTS - This Visit Only (only some results were available at the time of visit)  No results found for this visit on 01/10/20.

## 2020-01-10 NOTE — PROGRESS NOTES
Chief Complaint   Patient presents with   [address]     3 most recent PHQ Screens 1/10/2020   Little interest or pleasure in doing things Not at all   Feeling down, depressed, irritable, or hopeless Not at all   Total Score PHQ 2 0     1. Have you been to the ER, urgent care clinic since your last visit? Hospitalized since your last visit? Yes, mcv    2. Have you seen or consulted any other health care providers outside of the [address] [place] [address] since your last visit? Include any pap smears or colon screening.  Yes mcv

## 2020-01-10 NOTE — PATIENT INSTRUCTIONS
Prescription for WEIGHT LOSS:    · Avoid processed foods. · If you eat out, get the nutrition information from fast food or chain restaurants. · Choose the meals that are less than 400-500 calories. · Exercise:  · Walk non stop for 20 - 45 mins 5 days/wk    · Prepare your meals & snacks in advance if you want to stick to eating healthy for the long term. · Take time to eat your meal Slowly over 30 mins   · Take time & savor each bite  · Put fork down with each bite  · Chew slowly  · Avoid other activities except talking to your meal companions. · Drink one full glass of water Before you start your meal.    · Salad? · Get dressing on the side  · Dip fork into dressing before each forkful of salad, unless the oil in the dressing is extra virgin olive oil (EVOO, you can pour the dressing onto the salad). · Eat Your Veggies First!     Follow \"Plate\" diet:  -half plate should be veggies  - leafy greens  - broccoli, peppers, cauliflower, cabbage, string beans, asparagus, squash. ..  - a little bit of fruit  - one fourth plate should be protein (fish, chicken, meat, tofu, Greek yogurt. ..)  - one fourth plate limit for starch  - beans without added sugar are great here) OR  - sweet potato without added sugar is great here OR    - starchy veggies (lima beans, corn) OR    - rice / potato OR    - bread or biscuit    - No Seconds!   - if you can't avoid seconds, follow the above for second plate         Body Mass Index: Care Instructions  Your Care Instructions    Body mass index (BMI) can help you see if your weight is raising your risk for health problems. It uses a formula to compare how much you weigh with how tall you are. · A BMI lower than 18.5 is considered underweight. · A BMI between 18.5 and 24.9 is considered healthy. · A BMI between 25 and 29.9 is considered overweight. A BMI of 30 or higher is considered obese.   If your BMI is in the normal range, it means that you have a lower risk for weight-related health problems. If your BMI is in the overweight or obese range, you may be at increased risk for weight-related health problems, such as high blood pressure, heart disease, stroke, arthritis or joint pain, and diabetes. If your BMI is in the underweight range, you may be at increased risk for health problems such as fatigue, lower protection (immunity) against illness, muscle loss, bone loss, hair loss, and hormone problems. BMI is just one measure of your risk for weight-related health problems. You may be at higher risk for health problems if you are not active, you eat an unhealthy diet, or you drink too much alcohol or use tobacco products. Follow-up care is a key part of your treatment and safety. Be sure to make and go to all appointments, and call your doctor if you are having problems. It's also a good idea to know your test results and keep a list of the medicines you take. How can you care for yourself at home? · Practice healthy eating habits. This includes eating plenty of fruits, vegetables, whole grains, lean protein, and low-fat dairy. · If your doctor recommends it, get more exercise. Walking is a good choice. Bit by bit, increase the amount you walk every day. Try for at least 30 minutes on most days of the week. · Do not smoke. Smoking can increase your risk for health problems. If you need help quitting, talk to your doctor about stop-smoking programs and medicines. These can increase your chances of quitting for good. · Limit alcohol to 2 drinks a day for men and 1 drink a day for women. Too much alcohol can cause health problems. If you have a BMI higher than 25  · Your doctor may do other tests to check your risk for weight-related health problems. This may include measuring the distance around your waist. A waist measurement of more than 40 inches in men or 35 inches in women can increase the risk of weight-related health problems.   · Talk with your doctor about steps you can take to stay healthy or improve your health. You may need to make lifestyle changes to lose weight and stay healthy, such as changing your diet and getting regular exercise. If you have a BMI lower than 18.5  · Your doctor may do other tests to check your risk for health problems. · Talk with your doctor about steps you can take to stay healthy or improve your health. You may need to make lifestyle changes to gain or maintain weight and stay healthy, such as getting more healthy foods in your diet and doing exercises to build muscle. Where can you learn more? Go to http://tin-viv.info/. Enter S176 in the search box to learn more about \"Body Mass Index: Care Instructions. \"  Current as of: March 28, 2019  Content Version: 12.2  © 4126-5397 [employer]. Care instructions adapted under license by [employer] (which disclaims liability or warranty for this information). If you have questions about a medical condition or this instruction, always ask your healthcare professional. [address] any warranty or liability for your use of this information. Starting a Weight Loss Plan: Care Instructions  Your Care Instructions    If you are thinking about losing weight, it can be hard to know where to start. Your doctor can help you set up a weight loss plan that best meets your needs. You may want to take a class on nutrition or exercise, or join a weight loss support group. If you have questions about how to make changes to your eating or exercise habits, ask your doctor about seeing a registered dietitian or an exercise specialist.  It can be a big challenge to lose weight. But you do not have to make huge changes at once. Make small changes, and stick with them. When those changes become habit, add a few more changes. If you do not think you are ready to make changes right now, try to pick a date in the future.  Make an appointment to see your doctor to discuss whether the time is right for you to start a plan. Follow-up care is a key part of your treatment and safety. Be sure to make and go to all appointments, and call your doctor if you are having problems. It's also a good idea to know your test results and keep a list of the medicines you take. How can you care for yourself at home? · Set realistic goals. Many people expect to lose much more weight than is likely. A weight loss of 5% to 10% of your body weight may be enough to improve your health. · Get family and friends involved to provide support. Talk to them about why you are trying to lose weight, and ask them to help. They can help by participating in exercise and having meals with you, even if they may be eating something different. · Find what works best for you. If you do not have time or do not like to cook, a program that offers meal replacement bars or shakes may be better for you. Or if you like to prepare meals, finding a plan that includes daily menus and recipes may be best.  · Ask your doctor about other health professionals who can help you achieve your weight loss goals. ? A dietitian can help you make healthy changes in your diet. ? An exercise specialist or  can help you develop a safe and effective exercise program.  ? A counselor or psychiatrist can help you cope with issues such as depression, anxiety, or family problems that can make it hard to focus on weight loss. · Consider joining a support group for people who are trying to lose weight. Your doctor can suggest groups in your area. Where can you learn more? Go to http://tin-viv.info/. Enter L560 in the search box to learn more about \"Starting a Weight Loss Plan: Care Instructions. \"  Current as of: March 28, 2019  Content Version: 12.2  © 2486-2328 [employer].  Care instructions adapted under license by [employer] (which disclaims liability or warranty for this information). If you have questions about a medical condition or this instruction, always ask your healthcare professional. [address] any warranty or liability for your use of this information. Learning About Obesity  What is obesity? Obesity means having a body mass index (BMI) of 30 or above. BMI is a number that is calculated from your weight and your height. How do you know if your weight is in the obesity range? To know if your weight is in the obesity range, your doctor looks at your body mass index (BMI) and waist size. BMI is a number that is calculated from your weight and your height. To figure out your BMI for yourself, you can use an online tool, such as http://www.Olive Loom.com/ on the [employer] of [employer]. If your BMI is 30.0 or higher, it falls within the obesity range. Keep in mind that BMI and waist size are only guides. They are not tools to determine your ideal body weight. What causes obesity? When you take in more calories than you burn off, you gain weight. How you eat, how active you are, and other things affect how your body uses calories and whether you gain weight. If you have family members who have too much body fat, you may have inherited a tendency to gain weight. And your family also helps form your eating and lifestyle habits, which can lead to obesity. Also, our busy lives make it harder to plan and cook healthy meals. For many of us, it's easier to reach for prepared foods, go out to eat, or go to the drive-through. But these foods are often high in saturated fat and calories. Portions are often too large. What can you do to reach a healthy weight? Focus on health, not diets. Diets are hard to stay on and don't work in the long run. It is very hard to stay with a diet that includes lots of big changes in your eating habits.   Instead of a diet, focus on lifestyle changes that will improve your health and achieve the right balance of energy and calories. To lose weight, you need to burn more calories than you take in. You can do it by eating healthy foods in reasonable amounts and becoming more active, even a little bit every day. Making small changes over time can add up to a lot. Make a plan for change. Many people have found that naming their reasons for change and staying focused on their plan can make a big difference. Work with your doctor to create a plan that is right for you. · Ask yourself: Jacinto Estrada are my personal, most powerful reasons for wanting this change? What will my life look like when I've made the change? \"  · Set your long-term goal. Make it specific, such as \"I will lose x pounds. \"  · Break your long-term goal into smaller, short-term goals. Make these small steps specific and within your reach, things you know you can do. These steps are what keep you going from day to day. Talk with your doctor about other weight-loss options. If you have a BMI in a certain range and have not been able to lose weight with diet and exercise, medicine or surgery may be an option for you. Before your doctor will prescribe medicines or surgery, he or she will probably want you to be more active and follow your healthy eating plan for a period of time. These habits are key lifelong changes for managing your weight, with or without other medical treatment. And these changes can help you avoid weight-related health problems. How can you stay on your plan for change? Be ready. Choose to start during a time when there are few events that might trigger slip-ups, like holidays, social events, and high-stress periods. Decide on your first few steps. Most people have more success when they make small changes, one step at a time.  For example, you might switch a daily candy bar to a piece of fruit, walk 10 minutes more, or add more vegetables to a meal.  Line up your support people. Make sure you're not going to be alone as you make this change. Connect with people who understand how important it is to you. Ask family members and friends for help in keeping with your plan. And think about who could make it harder for you, and how to handle them. Try tracking. People who keep track of what they eat, feel, and do are better at losing weight. Try writing down things like:  · What and how much you eat. · How you feel before and after each meal.  · Details about each meal (like eating out or at home, eating alone, or with friends or family). · What you do to be active. Look and plan. As you track, look for patterns that you may want to change. Take note of:  · When you eat and whether you skip meals. · How often you eat out. · How many fruits and vegetables you eat. · When you eat beyond feeling full. · When and why you eat for reasons other than being hungry. When you stray from your plan, don't get upset. Figure out what made you slip up and how you can fix it. Can you take medicines or have surgery to lose weight? If you have a BMI in a certain range and have not been able to lose weight with diet and exercise, medicine or surgery may be an option for you. If you have a BMI of at least 30.0 (or a BMI of at least 27.0 and another health problem related to your weight), ask your doctor about weight-loss medicines. They work by making you feel less hungry, making you feel full more quickly, or changing how you digest fat. Medicines are used along with diet changes and more physical activity to help you make lasting changes. If you have a BMI of 40.0 or more (or a BMI of 35.0 or more and another health problem related to your weight), your doctor may talk with you about surgery. Weight-loss surgery has risks, and you will need to work with your doctor to compare the risk of having obesity with the risks of surgery.   With any option you choose, you will still need to eat a healthy diet and get regular exercise. Follow-up care is a key part of your treatment and safety. Be sure to make and go to all appointments, and call your doctor if you are having problems. It's also a good idea to know your test results and keep a list of the medicines you take. Where can you learn more? Go to http://tin-viv.info/. Enter N111 in the search box to learn more about \"Learning About Obesity. \"  Current as of: March 28, 2019  Content Version: 12.2  © 9432-9168 [employer]. Care instructions adapted under license by [employer] (which disclaims liability or warranty for this information). If you have questions about a medical condition or this instruction, always ask your healthcare professional. [address] any warranty or liability for your use of this information. Learning About Benefits From Quitting Smoking  How does quitting smoking make you healthier? If you're thinking about quitting smoking, you may have a few reasons to be smoke-free. Your health may be one of them. · When you quit smoking, you lower your risks for cancer, lung disease, heart attack, stroke, blood vessel disease, and blindness from macular degeneration. · When you're smoke-free, you get sick less often, and you heal faster. You are less likely to get colds, flu, bronchitis, and pneumonia. · As a nonsmoker, you may find that your mood is better and you are less stressed. When and how will you feel healthier? Quitting has real health benefits that start from day 1 of being smoke-free. And the longer you stay smoke-free, the healthier you get and the better you feel. The first hours  · After just 20 minutes, your blood pressure and heart rate go down. That means there's less stress on your heart and blood vessels. · Within 12 hours, the level of carbon monoxide in your blood drops back to normal. That makes room for more oxygen.  With more oxygen in your body, you may notice that you have more energy than when you smoked. After 2 weeks  · Your lungs start to work better. · Your risk of heart attack starts to drop. After 1 month  · When your lungs are clear, you cough less and breathe deeper, so it's easier to be active. · Your sense of taste and smell return. That means you can enjoy food more than you have since you started smoking. Over the years  · After 1 year, your risk of heart disease is half what it would be if you kept smoking. · After 5 years, your risk of stroke starts to shrink. Within a few years after that, it's about the same as if you'd never smoked. · After 10 years, your risk of dying from lung cancer is cut by about half. And your risk for many other types of cancer is lower too. How would quitting help others in your life? When you quit smoking, you improve the health of everyone who now breathes in your smoke. · Their heart, lung, and cancer risks drop, much like yours. · They are sick less. For babies and small children, living smoke-free means they're less likely to have ear infections, pneumonia, and bronchitis. · If you're a woman who is or will be pregnant someday, quitting smoking means a healthier . · Children who are close to you are less likely to become adult smokers. Where can you learn more? Go to http://tin-viv.info/. Enter [id number] [id number] in the search box to learn more about \"Learning About Benefits From Quitting Smoking. \"  Current as of: 2018  Content Version: 12.2  © 2249-6349 [employer]. Care instructions adapted under license by [employer] (which disclaims liability or warranty for this information). If you have questions about a medical condition or this instruction, always ask your healthcare professional. [address] any warranty or liability for your use of this information.

## 2020-01-25 PROBLEM — E66.01 CLASS 3 SEVERE OBESITY WITH BODY MASS INDEX (BMI) OF 40.0 TO 44.9 IN ADULT (HCC): Status: ACTIVE | Noted: 2020-01-25

## 2020-02-13 ENCOUNTER — HOSPITAL ENCOUNTER (OUTPATIENT)
Dept: GENERAL RADIOLOGY | Age: 55
Discharge: HOME | End: 2020-02-13
Attending: NURSE PRACTITIONER
Payer: MEDICARE

## 2020-02-13 ENCOUNTER — HOSPITAL ENCOUNTER (OUTPATIENT)
Dept: MRI IMAGING | Age: 55
Discharge: HOME | End: 2020-02-13
Attending: NURSE PRACTITIONER
Payer: MEDICARE

## 2020-02-13 DIAGNOSIS — M16.9 DEGENERATIVE JOINT DISEASE (DJD) OF HIP: ICD-10-CM

## 2020-02-13 DIAGNOSIS — M54.50 LUMBAGO: ICD-10-CM

## 2020-02-13 DIAGNOSIS — M47.816 LUMBAR SPONDYLOSIS: ICD-10-CM

## 2020-02-13 PROCEDURE — 72148 MRI LUMBAR SPINE W/O DYE: CPT

## 2020-02-13 PROCEDURE — 73521 X-RAY EXAM HIPS BI 2 VIEWS: CPT

## 2020-03-12 ENCOUNTER — HOSPITAL ENCOUNTER (EMERGENCY)
Age: 55
Discharge: HOME | End: 2020-03-12
Attending: EMERGENCY MEDICINE
Payer: MEDICARE

## 2020-03-12 ENCOUNTER — APPOINTMENT (OUTPATIENT)
Dept: GENERAL RADIOLOGY | Age: 55
End: 2020-03-12
Attending: EMERGENCY MEDICINE
Payer: MEDICARE

## 2020-03-12 VITALS
TEMPERATURE: 97.8 F | HEART RATE: 91 BPM | BODY MASS INDEX: 42.11 KG/M2 | RESPIRATION RATE: 18 BRPM | HEIGHT: 60 IN | SYSTOLIC BLOOD PRESSURE: 120 MMHG | WEIGHT: 214.51 LBS | DIASTOLIC BLOOD PRESSURE: 73 MMHG | OXYGEN SATURATION: 100 %

## 2020-03-12 DIAGNOSIS — S96.912A: Primary | ICD-10-CM

## 2020-03-12 PROCEDURE — 99282 EMERGENCY DEPT VISIT SF MDM: CPT

## 2020-03-12 PROCEDURE — 73660 X-RAY EXAM OF TOE(S): CPT

## 2020-03-13 NOTE — ED PROVIDER NOTES
EMERGENCY DEPARTMENT HISTORY AND PHYSICAL EXAM      Date: 3/12/2020  Patient Name: Robin Jalloh    History of Presenting Illness     Chief Complaint   Patient presents with    Toe Injury     Patient reports hitting her L ring toe on the toilet seat this am.       History Provided By: Patient    HPI: Robin Jalloh, 47 y.o. female presents by POV to the ED with cc of left 4th toe pain status post injury that occurred this morning. The patient notes accidentally hitting her toe on a toilet earlier today. She has immediate onset of pain that has been constant since. The pain is non-radiating and worsens with movement. She denies prior injuries to the same toe. She took both Tylenol and Motrin without relief of pain. She denies prior injuries to the affected toe. There are no other complaints, changes, or physical findings at this time. Social Hx: Tobacco (1 ppd), EtOH (denies), Illicit drug use (denies)     PCP: Nestor Ferreira MD    No current facility-administered medications on file prior to encounter. Current Outpatient Medications on File Prior to Encounter   Medication Sig Dispense Refill    phentermine 37.5 mg capsule take 1 capsule by mouth every morning      pramipexole (MIRAPEX) 0.25 mg tablet       VICTOZA 3-MARILIN 0.6 mg/0.1 mL (18 mg/3 mL) pnij       BREO ELLIPTA 200-25 mcg/dose inhaler       ADVAIR DISKUS 250-50 mcg/dose diskus inhaler       buPROPion XL (WELLBUTRIN XL) 150 mg tablet       VENTOLIN HFA 90 mcg/actuation inhaler       cyclobenzaprine (FLEXERIL) 10 mg tablet Take 1 Tab by mouth three (3) times daily as needed for Muscle Spasm(s). 15 Tab 0    mirabegron ER (MYRBETRIQ) 25 mg ER tablet Take 25 mg by mouth daily.  metoprolol succinate (TOPROL-XL) 25 mg XL tablet Take 1 Tab by mouth daily.  30 Tab 3    atorvastatin (LIPITOR) 20 mg tablet  20 mg = 1 tab each dose, PO, bedtime, 0 Refills      omeprazole (PRILOSEC) 40 mg capsule Take 1 capsule by mouth two (2) times a day. 60 capsule 2    albuterol (PROVENTIL HFA, VENTOLIN HFA) 90 mcg/actuation inhaler Take 2 Puffs by inhalation as needed.  ARIPiprazole (ABILIFY) 10 mg tablet Take 10 mg by mouth daily.  topiramate (TOPAMAX) 50 mg tablet       lisinopril (PRINIVIL, ZESTRIL) 10 mg tablet       CONTOUR NEXT TEST STRIPS strip       alcohol swabs padm       iron,carbonyl-vitamin C (VITRON-C) 65 mg iron- 125 mg TbEC Take 1 Tab by mouth daily.  [DISCONTINUED] oxybutynin (DITROPAN) 5 mg tablet       [DISCONTINUED] tamsulosin (FLOMAX) 0.4 mg capsule       [DISCONTINUED] omeprazole (PRILOSEC) 40 mg capsule       [DISCONTINUED] metoprolol succinate (TOPROL-XL) 25 mg XL tablet       [DISCONTINUED] MYRBETRIQ 25 mg ER tablet       [DISCONTINUED] gabapentin (NEURONTIN) 300 mg capsule       [DISCONTINUED] cyclobenzaprine (FLEXERIL) 10 mg tablet       [DISCONTINUED] diclofenac EC (VOLTAREN) 75 mg EC tablet       [DISCONTINUED] citalopram (CELEXA) 40 mg tablet       [DISCONTINUED] atorvastatin (LIPITOR) 20 mg tablet       [DISCONTINUED] ARIPiprazole (ABILIFY) 10 mg tablet       [DISCONTINUED] metFORMIN ER (GLUCOPHAGE XR) 500 mg tablet       [DISCONTINUED] ascorbic acid, vitamin C, (VITAMIN C) 250 mg tablet Take 1 Tab by mouth. Indications: iron deficiency      [DISCONTINUED] zinc sulfate 220 mg tablet Take 1 Tab by mouth daily.  [DISCONTINUED] naproxen (NAPROSYN) 500 mg tablet Take 1 Tab by mouth two (2) times daily (with meals). 10 Tab 0    [DISCONTINUED] lidocaine (LIDODERM) 5 % Apply patch to the affected area for 12 hours a day and remove for 12 hours a day. 10 Each 0    [DISCONTINUED] pramipexole di-HCl (PRAMIPEXOLE PO) Take  by mouth.  [DISCONTINUED] ondansetron (ZOFRAN ODT) 4 mg disintegrating tablet Take 1 Tab by mouth every eight (8) hours as needed for Nausea. 10 Tab 0    [DISCONTINUED] TOPIRAMATE (TOPAMAX PO) Take  by mouth.       [DISCONTINUED] LIRAGLUTIDE (VICTOZA 2-MARILIN SC) by SubCUTAneous route. 1.8 MG QD      metFORMIN ER (GLUCOPHAGE XR) 500 mg tablet   0    CONTOUR NEXT STRIPS strip   1    alcohol swabs padm   1    [DISCONTINUED] lisinopril (PRINIVIL, ZESTRIL) 20 mg tablet   1    ANSHUL PEN NEEDLE 32 x 5/ \" ndle daily.  [DISCONTINUED] insulin glargine (LANTUS) 100 unit/mL injection 30 Units by SubCUTAneous route nightly. Indications: HYPERGLYCEMIA, TYPE 2 DIABETES MELLITUS      [DISCONTINUED] citalopram (CELEXA) 40 mg tablet Take 40 mg by mouth daily. Past History     Past Medical History:  Past Medical History:   Diagnosis Date    Chronic pelvic pain in female 2014    Depression     Diabetes ([facility]     Hypertension     Obesity (BMI 35.0-39.9 without comorbidity)     SHAGUFTA on CPAP 3/9/2017    Posttraumatic stress disorder     Psychiatric disorder     depression    Recurrent major depression ([facility] 2010    S/P ventral herniorrhaphy 2018    Suicidal thoughts     Thyroid nodule 10/1/2015    Unspecified sleep apnea     uses cpap       Past Surgical History:  Past Surgical History:   Procedure Laterality Date    ABDOMEN SURGERY PROC UNLISTED      cholecystectomy    HX  SECTION      HX  SECTION      HX HEENT      head and neck surgery     HX HERNIA REPAIR  2018    incarcerated ventral hernia repair with mesh    HX HYSTERECTOMY      HX ORTHOPAEDIC      L ankle surgery     HX ORTHOPAEDIC Left     TOTAL HIP REPLACEMENT    HX TUBAL LIGATION      PPBTL       Family History:  Family History   Problem Relation Age of Onset    Cancer Mother 61        Breast cancer   [de-identified] Breast Cancer Mother 48    Heart Disease Father 50        M. Krishna Tidwell Asthma Sister     Heart Disease Brother     Hypertension Brother     Hypertension Brother     Diabetes Brother     Lung Disease Brother         COPD       Social History:  Social History     Tobacco Use    Smoking status: Former Smoker     Packs/day: 2.00     Years: 30.00 Pack years: 60.00     Types: Cigarettes     Last attempt to quit: 2019     Years since quittin.6    Smokeless tobacco: Never Used   Substance Use Topics    Alcohol use: Never     Frequency: Never     Binge frequency: Never    Drug use: Never       Allergies: Allergies   Allergen Reactions    Zantac [Ranitidine Hcl] Hives    Zantac [Ranitidine Hcl] Hives         Review of Systems   Review of Systems   Constitutional: Negative for chills, diaphoresis and fever. HENT: Negative for congestion, ear pain, rhinorrhea and sore throat. Respiratory: Negative for cough and shortness of breath. Cardiovascular: Negative for chest pain. Gastrointestinal: Negative for abdominal pain, constipation, diarrhea, nausea and vomiting. Genitourinary: Negative for difficulty urinating, dysuria, frequency and hematuria. Musculoskeletal: Positive for arthralgias and gait problem. Negative for myalgias. Neurological: Negative for headaches. All other systems reviewed and are negative. Physical Exam   Physical Exam  Vitals signs and nursing note reviewed. Constitutional:       General: She is not in acute distress. Appearance: She is well-developed. She is obese. She is not diaphoretic. Comments: 47 y.o. -American female    HENT:      Head: Normocephalic and atraumatic. Eyes:      General:         Right eye: No discharge. Left eye: No discharge. Conjunctiva/sclera: Conjunctivae normal.   Neck:      Musculoskeletal: Normal range of motion and neck supple. Cardiovascular:      Rate and Rhythm: Normal rate and regular rhythm. Heart sounds: Normal heart sounds. No murmur. Pulmonary:      Effort: Pulmonary effort is normal. No respiratory distress. Breath sounds: Normal breath sounds. Musculoskeletal:      Comments: LEFT 4th TOE: + swelling and ecchymosis with tenderness to palpation. No deformity. FROM with pain. Distal NV intact. Cap refill < 2 sec. Ambulatory with limp and the use of a cane. Skin:     General: Skin is warm and dry. Neurological:      Mental Status: She is alert and oriented to person, place, and time. Psychiatric:         Behavior: Behavior normal.         Diagnostic Study Results     Labs - none    Radiologic Studies -   XR 4TH TOE LT MIN 2 V   Final Result   IMPRESSION: No acute fracture. The above xray(s) have been interpreted independently by me and I agree with the radiologists findings above. Medical Decision Making   I am the first provider for this patient. I reviewed the vital signs, available nursing notes, past medical history, past surgical history, family history and social history. Vital Signs-Reviewed the patient's vital signs. Patient Vitals for the past 12 hrs:   Temp Pulse Resp BP SpO2   03/12/20 2105 97.8 °F (36.6 °C) 91 18 120/73 100 %       Records Reviewed: Nursing Notes    Provider Notes (Medical Decision Making):   Patient with an isolated injury to a single toe from an injury that occurred occurred this morning. X-rays ordered and show no fracture. Will treat with a postop shoe and follow-up with orthopedics in 1 to 2 weeks if not improved. ED Course:   Initial assessment performed. The patients presenting problems have been discussed, and they are in agreement with the care plan formulated and outlined with them. I have encouraged them to ask questions as they arise throughout their visit. Tobacco Counseling  Discussed the risks of smoking and the benefits of smoking cessation as well as the long term sequelae of smoking with the patient. The patient verbalized their understanding. Counseled patient for approximately 3 minutes. Critical Care Time: None    Disposition:  DISCHARGE NOTE:  10:06 PM  The pt is ready for discharge. The pt's signs, symptoms, diagnosis, and discharge instructions have been discussed and pt has conveyed their understanding.  The pt is to follow up as recommended or return to ER should their symptoms worsen. Plan has been discussed and pt is in agreement. PLAN:  1. Current Discharge Medication List      CONTINUE these medications which have NOT CHANGED    Details   phentermine 37.5 mg capsule take 1 capsule by mouth every morning      pramipexole (MIRAPEX) 0.25 mg tablet       VICTOZA 3-MARILIN 0.6 mg/0.1 mL (18 mg/3 mL) pnij       BREO ELLIPTA 200-25 mcg/dose inhaler       ADVAIR DISKUS 250-50 mcg/dose diskus inhaler       buPROPion XL (WELLBUTRIN XL) 150 mg tablet       !! VENTOLIN HFA 90 mcg/actuation inhaler       cyclobenzaprine (FLEXERIL) 10 mg tablet Take 1 Tab by mouth three (3) times daily as needed for Muscle Spasm(s). Qty: 15 Tab, Refills: 0      mirabegron ER (MYRBETRIQ) 25 mg ER tablet Take 25 mg by mouth daily. metoprolol succinate (TOPROL-XL) 25 mg XL tablet Take 1 Tab by mouth daily. Qty: 30 Tab, Refills: 3    Associated Diagnoses: Hypertrophic cardiomyopathy (HCC)      atorvastatin (LIPITOR) 20 mg tablet  20 mg = 1 tab each dose, PO, bedtime, 0 Refills      omeprazole (PRILOSEC) 40 mg capsule Take 1 capsule by mouth two (2) times a day. Qty: 60 capsule, Refills: 2      !! albuterol (PROVENTIL HFA, VENTOLIN HFA) 90 mcg/actuation inhaler Take 2 Puffs by inhalation as needed. ARIPiprazole (ABILIFY) 10 mg tablet Take 10 mg by mouth daily. topiramate (TOPAMAX) 50 mg tablet       lisinopril (PRINIVIL, ZESTRIL) 10 mg tablet       !! CONTOUR NEXT TEST STRIPS strip       !! alcohol swabs padm       iron,carbonyl-vitamin C (VITRON-C) 65 mg iron- 125 mg TbEC Take 1 Tab by mouth daily. Associated Diagnoses: Anemia, unspecified type      metFORMIN ER (GLUCOPHAGE XR) 500 mg tablet Refills: 0      !! CONTOUR NEXT STRIPS strip Refills: 1      !! alcohol swabs padm Refills: 1      ANSHUL PEN NEEDLE 32 x 5/32 \" ndle daily. !! - Potential duplicate medications found. Please discuss with provider.         2.   Follow-up Information Follow up With Specialties Details Why Contact Info    Lowell Pierson MD Family Practice In 1 week As needed [facility] [address]      Reese Pate MD Orthopedic Surgery, Hand Surgery In 1 week As needed [address]  [place] [facility].  [phone]        3. Use cast shoe and continue to use cane. 4. Rest, ice and elevate  5. Take current medications or over the counter medications for pain. Return to ED if worse     Diagnosis     Clinical Impression:   1. Strain of fourth toe of left foot, initial encounter          Please note that this dictation was completed with [employer], the computer voice recognition software. Quite often unanticipated grammatical, syntax, homophones, and other interpretive errors are inadvertently transcribed by the computer software. Please disregards these errors. Please excuse any errors that have escaped final proofreading. This note will not be viewable in [address]

## 2020-03-13 NOTE — ED NOTES
Pt received durable medical hard shoe, form printed, signed, sticker placed on form from product and placed in green folder at unit [place], [place] reviewed discharge instructions with the patient. The patient verbalized understanding. All questions and concerns were addressed. The patient declined a wheelchair and is discharged ambulatory  with instructions and prescriptions in hand. Pt is alert and oriented x 4. Respirations are clear and unlabored.

## 2020-03-13 NOTE — DISCHARGE INSTRUCTIONS
Patient Education        Foot Sprain: Care Instructions  Your Care Instructions    A foot sprain occurs when you stretch or tear the ligaments around your foot. Ligaments are the tough tissues that connect one bone to another. A sprain can happen when you run, fall, or hit your toe against something. Sprains often happen when you jump or change direction quickly. This may occur when you play basketball, soccer, or other sports. Most foot sprains will get better with treatment at home. Follow-up care is a key part of your treatment and safety. Be sure to make and go to all appointments, and call your doctor if you are having problems. It's also a good idea to know your test results and keep a list of the medicines you take. How can you care for yourself at home? · Walk or put weight on your sprained foot as long as it does not hurt. · If your doctor gave you a splint or immobilizer, wear it as directed. If you were given crutches, use them as directed. · For the first 2 days after your injury, avoid hot showers, hot tubs, or hot packs. They may increase swelling. · Put ice or a cold pack on your foot for 10 to 20 minutes at a time to stop swelling. Try this every 1 to 2 hours for 3 days (when you are awake) or until the swelling goes down. Put a thin cloth between the ice pack and your skin. Keep your splint dry. · After 2 or 3 days, if your swelling is gone, put a heating pad (set on low) or a warm cloth on your foot. Some doctors suggest that you go back and forth between hot and cold treatments. · Prop up your foot on a pillow when you ice it or anytime you sit or lie down. Try to keep it above the level of your heart. This will help reduce swelling. · Take pain medicines exactly as directed. ? If the doctor gave you a prescription medicine for pain, take it as prescribed. ? If you are not taking a prescription pain medicine, ask your doctor if you can take an over-the-counter medicine.   · Do any exercises that your doctor or physical therapist suggests. · Return to your usual exercise gradually as you feel better. When should you call for help? Call your doctor now or seek immediate medical care if:    · You have increased or severe pain.     · Your toes are cool or pale or change color.     · Your wrap or splint feels too tight.     · You have signs of a blood clot, such as:  ? Pain in your calf, back of the knee, thigh, or groin. ? Redness and swelling in your leg or groin.     · You have tingling, weakness, or numbness in your leg or foot.    Watch closely for changes in your health, and be sure to contact your doctor if:    · You cannot put any weight on your foot.     · You get a fever.     · You do not get better as expected. Where can you learn more? Go to http://tin-viv.info/  Enter L876 in the search box to learn more about \"Foot Sprain: Care Instructions. \"  Current as of: June 26, 2019Content Version: 12.4  © 2196-1112 [employer]. Care instructions adapted under license by [employer] (which disclaims liability or warranty for this information). If you have questions about a medical condition or this instruction, always ask your healthcare professional. [address] any warranty or liability for your use of this information.

## 2020-04-20 ENCOUNTER — ANESTHESIA (OUTPATIENT)
Dept: ENDOSCOPY | Age: 55
End: 2020-04-20
Payer: MEDICARE

## 2020-04-20 ENCOUNTER — HOSPITAL ENCOUNTER (OUTPATIENT)
Age: 55
Setting detail: OUTPATIENT SURGERY
Discharge: HOME | End: 2020-04-20
Attending: SPECIALIST | Admitting: SPECIALIST
Payer: MEDICARE

## 2020-04-20 ENCOUNTER — ANESTHESIA EVENT (OUTPATIENT)
Dept: ENDOSCOPY | Age: 55
End: 2020-04-20
Payer: MEDICARE

## 2020-04-20 VITALS
HEART RATE: 112 BPM | SYSTOLIC BLOOD PRESSURE: 114 MMHG | OXYGEN SATURATION: 96 % | BODY MASS INDEX: 42.18 KG/M2 | TEMPERATURE: 97.9 F | WEIGHT: 216 LBS | DIASTOLIC BLOOD PRESSURE: 54 MMHG

## 2020-04-20 LAB
H PYLORI FROM TISSUE: NEGATIVE
KIT LOT NO., HCLOLOT: NORMAL
NEGATIVE CONTROL: NEGATIVE
POSITIVE CONTROL: POSITIVE

## 2020-04-20 PROCEDURE — 77030021593 HC FCPS BIOP ENDOSC BSC -A: Performed by: SPECIALIST

## 2020-04-20 PROCEDURE — 74011250637 HC RX REV CODE- 250/637: Performed by: NURSE ANESTHETIST, CERTIFIED REGISTERED

## 2020-04-20 PROCEDURE — 76060000031 HC ANESTHESIA FIRST 0.5 HR: Performed by: SPECIALIST

## 2020-04-20 PROCEDURE — 77030020268 HC MISC GENERAL SUPPLY: Performed by: SPECIALIST

## 2020-04-20 PROCEDURE — 74011250636 HC RX REV CODE- 250/636: Performed by: NURSE ANESTHETIST, CERTIFIED REGISTERED

## 2020-04-20 PROCEDURE — 77030039825 HC MSK NSL PAP SUPERNO2VA VYRM -B: Performed by: ANESTHESIOLOGY

## 2020-04-20 PROCEDURE — 76040000019: Performed by: SPECIALIST

## 2020-04-20 PROCEDURE — 87077 CULTURE AEROBIC IDENTIFY: CPT | Performed by: SPECIALIST

## 2020-04-20 PROCEDURE — 74011000250 HC RX REV CODE- 250: Performed by: NURSE ANESTHETIST, CERTIFIED REGISTERED

## 2020-04-20 RX ORDER — PROPOFOL 10 MG/ML
INJECTION, EMULSION INTRAVENOUS AS NEEDED
Status: DISCONTINUED | OUTPATIENT
Start: 2020-04-20 | End: 2020-04-20 | Stop reason: HOSPADM

## 2020-04-20 RX ORDER — FENTANYL CITRATE 50 UG/ML
12.5-5 INJECTION, SOLUTION INTRAMUSCULAR; INTRAVENOUS
Status: DISCONTINUED | OUTPATIENT
Start: 2020-04-20 | End: 2020-04-20 | Stop reason: HOSPADM

## 2020-04-20 RX ORDER — MIDAZOLAM HYDROCHLORIDE 1 MG/ML
.25-1 INJECTION, SOLUTION INTRAMUSCULAR; INTRAVENOUS
Status: DISCONTINUED | OUTPATIENT
Start: 2020-04-20 | End: 2020-04-20 | Stop reason: HOSPADM

## 2020-04-20 RX ORDER — GLYCOPYRROLATE 0.2 MG/ML
INJECTION INTRAMUSCULAR; INTRAVENOUS AS NEEDED
Status: DISCONTINUED | OUTPATIENT
Start: 2020-04-20 | End: 2020-04-20 | Stop reason: HOSPADM

## 2020-04-20 RX ORDER — LORAZEPAM 2 MG/ML
2 INJECTION INTRAMUSCULAR AS NEEDED
Status: DISCONTINUED | OUTPATIENT
Start: 2020-04-20 | End: 2020-04-20 | Stop reason: HOSPADM

## 2020-04-20 RX ORDER — ALBUTEROL SULFATE 90 UG/1
AEROSOL, METERED RESPIRATORY (INHALATION) AS NEEDED
Status: DISCONTINUED | OUTPATIENT
Start: 2020-04-20 | End: 2020-04-20 | Stop reason: HOSPADM

## 2020-04-20 RX ORDER — LIDOCAINE HYDROCHLORIDE 20 MG/ML
INJECTION, SOLUTION EPIDURAL; INFILTRATION; INTRACAUDAL; PERINEURAL AS NEEDED
Status: DISCONTINUED | OUTPATIENT
Start: 2020-04-20 | End: 2020-04-20 | Stop reason: HOSPADM

## 2020-04-20 RX ORDER — ATROPINE SULFATE 0.1 MG/ML
0.5 INJECTION INTRAVENOUS
Status: DISCONTINUED | OUTPATIENT
Start: 2020-04-20 | End: 2020-04-20 | Stop reason: HOSPADM

## 2020-04-20 RX ORDER — SODIUM CHLORIDE 9 MG/ML
50 INJECTION, SOLUTION INTRAVENOUS CONTINUOUS
Status: DISCONTINUED | OUTPATIENT
Start: 2020-04-20 | End: 2020-04-20 | Stop reason: HOSPADM

## 2020-04-20 RX ORDER — SODIUM CHLORIDE 0.9 % (FLUSH) 0.9 %
5-40 SYRINGE (ML) INJECTION AS NEEDED
Status: DISCONTINUED | OUTPATIENT
Start: 2020-04-20 | End: 2020-04-20 | Stop reason: HOSPADM

## 2020-04-20 RX ORDER — FLUMAZENIL 0.1 MG/ML
0.2 INJECTION INTRAVENOUS
Status: DISCONTINUED | OUTPATIENT
Start: 2020-04-20 | End: 2020-04-20 | Stop reason: HOSPADM

## 2020-04-20 RX ORDER — SODIUM CHLORIDE 0.9 % (FLUSH) 0.9 %
5-40 SYRINGE (ML) INJECTION EVERY 8 HOURS
Status: DISCONTINUED | OUTPATIENT
Start: 2020-04-20 | End: 2020-04-20 | Stop reason: HOSPADM

## 2020-04-20 RX ORDER — DEXTROMETHORPHAN/PSEUDOEPHED 2.5-7.5/.8
1.2 DROPS ORAL
Status: DISCONTINUED | OUTPATIENT
Start: 2020-04-20 | End: 2020-04-20 | Stop reason: HOSPADM

## 2020-04-20 RX ORDER — EPINEPHRINE 0.1 MG/ML
1 INJECTION INTRACARDIAC; INTRAVENOUS
Status: DISCONTINUED | OUTPATIENT
Start: 2020-04-20 | End: 2020-04-20 | Stop reason: HOSPADM

## 2020-04-20 RX ORDER — SODIUM CHLORIDE 9 MG/ML
INJECTION, SOLUTION INTRAVENOUS
Status: DISCONTINUED | OUTPATIENT
Start: 2020-04-20 | End: 2020-04-20 | Stop reason: HOSPADM

## 2020-04-20 RX ORDER — NALOXONE HYDROCHLORIDE 0.4 MG/ML
0.4 INJECTION, SOLUTION INTRAMUSCULAR; INTRAVENOUS; SUBCUTANEOUS
Status: DISCONTINUED | OUTPATIENT
Start: 2020-04-20 | End: 2020-04-20 | Stop reason: HOSPADM

## 2020-04-20 RX ADMIN — PROPOFOL 50 MG: 10 INJECTION, EMULSION INTRAVENOUS at 12:55

## 2020-04-20 RX ADMIN — LIDOCAINE HYDROCHLORIDE 40 MG: 20 INJECTION, SOLUTION EPIDURAL; INFILTRATION; INTRACAUDAL; PERINEURAL at 12:48

## 2020-04-20 RX ADMIN — SODIUM CHLORIDE: 900 INJECTION, SOLUTION INTRAVENOUS at 12:46

## 2020-04-20 RX ADMIN — PROPOFOL 100 MG: 10 INJECTION, EMULSION INTRAVENOUS at 12:47

## 2020-04-20 RX ADMIN — GLYCOPYRROLATE 0.2 MG: 0.2 INJECTION, SOLUTION INTRAMUSCULAR; INTRAVENOUS at 12:50

## 2020-04-20 RX ADMIN — LIDOCAINE HYDROCHLORIDE 60 MG: 20 INJECTION, SOLUTION EPIDURAL; INFILTRATION; INTRACAUDAL; PERINEURAL at 12:53

## 2020-04-20 RX ADMIN — ALBUTEROL SULFATE 6 PUFF: 90 AEROSOL, METERED RESPIRATORY (INHALATION) at 12:58

## 2020-04-20 RX ADMIN — PROPOFOL 50 MG: 10 INJECTION, EMULSION INTRAVENOUS at 12:50

## 2020-04-20 NOTE — H&P
Pre-endoscopy H and P    The patient was seen and examined. The airway was assessed and documented. The problem list, past medical history, and medications were reviewed.      Patient Active Problem List   Diagnosis Code    Recurrent major depression ([facility]) F33.9    Obesity (BMI 35.0-39.9 without comorbidity) E66.9    Posttraumatic stress disorder F43.10    H/O:  Z98.891    H/O tubal ligation Z98.51    Granulation tissue of vaginal cuff N89.8    Thyroid nodule E04.1    SHAGUFTA on CPAP G47.33, Z99.89    Essential hypertension I10    Smoker F17.200    Obesity, morbid ([facility]) E66.01    S/P ventral herniorrhaphy Z98.890, Z87.19    Class 3 severe obesity with body mass index (BMI) of 40.0 to 44.9 in adult ([facility]) E66.01, Z68.41     Social History     Socioeconomic History    Marital status: SINGLE     Spouse name: Not on file    Number of children: Not on file    Years of education: Not on file    Highest education level: Not on file   Occupational History    Not on file   Social Needs    Financial resource strain: Not on file    Food insecurity     Worry: Not on file     Inability: Not on file    Transportation needs     Medical: Not on file     Non-medical: Not on file   Tobacco Use    Smoking status: Former Smoker     Packs/day: 2.00     Years: 30.00     Pack years: 60.00     Types: Cigarettes     Last attempt to quit: 2019     Years since quittin.7    Smokeless tobacco: Never Used   Substance and Sexual Activity    Alcohol use: Never     Frequency: Never     Binge frequency: Never    Drug use: Never    Sexual activity: Not Currently     Partners: Male     Birth control/protection: Surgical   Lifestyle    Physical activity     Days per week: Not on file     Minutes per session: Not on file    Stress: Not on file   Relationships    Social connections     Talks on phone: Not on file     Gets together: Not on file     Attends Nondenominational service: Not on file     Active member of club or organization: Not on file     Attends meetings of clubs or organizations: Not on file     Relationship status: Not on file    Intimate partner violence     Fear of current or ex partner: Not on file     Emotionally abused: Not on file     Physically abused: Not on file     Forced sexual activity: Not on file   Other Topics Concern    Not on file   Social History Narrative    ** Merged History Encounter **          Past Medical History:   Diagnosis Date    Chronic pelvic pain in female 2014    Depression     [facility] ([facility]     Hypertension     Obesity (BMI 35.0-39.9 without comorbidity)     SHAGUFTA on CPAP 3/9/2017    Posttraumatic stress disorder     Psychiatric disorder     depression    Recurrent major depression ([facility] 2010    S/P ventral herniorrhaphy 2018    Suicidal thoughts     Thyroid nodule 10/1/2015    Unspecified sleep apnea     uses cpap     The patient has a family history of na    Prior to Admission Medications   Prescriptions Last Dose Informant Patient Reported? Taking? ADVAIR DISKUS 250-50 mcg/dose diskus inhaler 2020 at Unknown time  Yes Yes   ARIPiprazole (ABILIFY) 10 mg tablet 2020 at Unknown time  Yes Yes   Sig: Take 10 mg by mouth daily. BREO ELLIPTA 200-25 mcg/dose inhaler 2020 at Unknown time  Yes Yes   VICTOZA 3-MARILIN 0.6 mg/0.1 mL (18 mg/3 mL) pnij 2020 at Unknown time  Yes Yes   albuterol (PROVENTIL HFA, VENTOLIN HFA) 90 mcg/actuation inhaler 2020 at Unknown time  Yes Yes   Sig: Take 2 Puffs by inhalation as needed. atorvastatin (LIPITOR) 20 mg tablet 2020 at Unknown time  Yes Yes   Si mg = 1 tab each dose, PO, bedtime, 0 Refills   buPROPion XL (WELLBUTRIN XL) 150 mg tablet 2020 at Unknown time  Yes Yes   cyclobenzaprine (FLEXERIL) 10 mg tablet 2020 at Unknown time  No Yes   Sig: Take 1 Tab by mouth three (3) times daily as needed for Muscle Spasm(s).    lisinopril (PRINIVIL, ZESTRIL) 10 mg tablet 2020 at Unknown time  Yes Yes   metFORMIN ER (GLUCOPHAGE XR) 500 mg tablet 4/19/2020 at Unknown time  Yes Yes   metoprolol succinate (TOPROL-XL) 25 mg XL tablet 4/19/2020 at Unknown time  No Yes   Sig: Take 1 Tab by mouth daily. mirabegron ER (MYRBETRIQ) 25 mg ER tablet 4/19/2020 at Unknown time  Yes Yes   Sig: Take 25 mg by mouth daily. omeprazole (PRILOSEC) 40 mg capsule 4/19/2020 at Unknown time  No Yes   Sig: Take 1 capsule by mouth two (2) times a day. phentermine 37.5 mg capsule 4/19/2020 at Unknown time  Yes Yes   Sig: take 1 capsule by mouth every morning   pramipexole (MIRAPEX) 0.25 mg tablet 4/19/2020 at Unknown time  Yes Yes   topiramate (TOPAMAX) 50 mg tablet 4/19/2020 at Unknown time  Yes Yes      Facility-Administered Medications: None         The review of systems is:  negative for shortness of breath or chest pain      The heart, lungs and mental status were satisfactory for the administration of MAC sedation and for the procedure. Mallampati score: See Anesthesia. I discussed with the patient the objectives, risks, consequences and alternatives to the procedure. Plan: Endoscopic procedure with MAC sedation.     Sherry Parikh MD  4/20/2020  11:54 AM

## 2020-04-20 NOTE — PROCEDURES
EGD Procedure Note    Indications:  Dysphagia/odynophagia only recent development no hx of GERD  Referring Physician: Brayden Parisi MD   Anesthesia/Sedation:MAC  Endoscopist:  Dr. Virgilio Pisano  Assistant:  Endoscopy Technician-1: Butch Maria  Endoscopy RN-1: Vinny Nicole RN  Surgical Assistant: None  Implants: None      Preoperative diagnosis: GASTROESOPHAGEAL REFLUX DISEASE  DYSPHAGIA    Postoperative diagnosis: DYSPHAGIA, MILD GASTRITIS      Procedure in Detail:  Informed consent was obtained for the procedure, including sedation. Risks of perforation, hemorrhage, adverse drug reaction, and aspiration were discussed. The patient was placed in the left lateral decubitus position. Based on the pre-procedure assessment, including review of the patient's medical history, medications, allergies, and review of systems, she had been deemed to be an appropriate candidate for moderate sedation; she was therefore sedated with the medications listed above. The patient was monitored continuously with ECG tracing, pulse oximetry, blood pressure monitoring, and direct observations. An Olympus video endoscope was inserted into the mouth and advanced under direct vision to into the esophagus, then stomach and duodenum. A careful inspection was made as the gastroscope was withdrawn, including a retroflexed view of the proximal stomach; findings and interventions are described below. Findings: edematous oropharynx w/o inflammation  Esophagus:normal OTG [address]  Stomach: mild patchy erythema - Pyloritec  Duodenum/jejunum: normal    Therapies:  See above    Specimens: see above           EBL: None    Complications:   None; patient tolerated the procedure well. Recommendations:  -Acid suppression with a proton pump inhibitor. , -Await NAA test result and treat for Helicobacter pylori if positive. , -No NSAIDS, -see your primary care doctor for possible pharyngitis, call office to schedule esophagogram xray of your esophagus.  No definite blockage maybe throat infection    Monet Nino MD

## 2020-04-20 NOTE — ANESTHESIA PREPROCEDURE EVALUATION
Relevant Problems   No relevant active problems       Anesthetic History               Review of Systems / Medical History  Patient summary reviewed, nursing notes reviewed and pertinent labs reviewed    Pulmonary        Sleep apnea           Neuro/Psych         Psychiatric history     Cardiovascular    Hypertension                   GI/Hepatic/Renal                Endo/Other    Diabetes  Hypothyroidism  Morbid obesity     Other Findings            Physical Exam    Airway  Mallampati: III  TM Distance: 4 - 6 cm  Neck ROM: normal range of motion   Mouth opening: Normal     Cardiovascular    Rhythm: regular           Dental  No notable dental hx       Pulmonary  Breath sounds clear to auscultation               Abdominal         Other Findings            Anesthetic Plan    ASA: 3  Anesthesia type: MAC          Induction: Intravenous  Anesthetic plan and risks discussed with: Patient

## 2020-04-20 NOTE — PROGRESS NOTES
Endoscope was pre-cleaned at bedside immediately following procedure by Abdirashid Spear. Dhara Herman

## 2020-04-20 NOTE — ANESTHESIA POSTPROCEDURE EVALUATION
Post-Anesthesia Evaluation and Assessment    Patient: Suni Wayne MRN: [id number]  SSN: [id number]    YOB: 1965  Age: 47 y.o. Sex: female      I have evaluated the patient and they are stable and ready for discharge from the PACU. Cardiovascular Function/Vital Signs  Visit Vitals  /54   Pulse (!) 112   Temp 36.6 °C (97.9 °F)   Resp (!) 0   Wt 98 kg (216 lb)   SpO2 96%   Breastfeeding No   BMI 42.18 kg/m²       Patient is status post MAC anesthesia for Procedure(s) with comments:  EGD (ESSENTIAL)  ESOPHAGOGASTRODUODENAL (EGD) BIOPSY - H. PYLORI ONLY  ESOPHAGEAL DILATION. Nausea/Vomiting: None    Postoperative hydration reviewed and adequate. Pain:  Pain Scale 1: Numeric (0 - 10) (04/20/20 1324)  Pain Intensity 1: 0 (04/20/20 1324)   Managed    Neurological Status: At baseline    Mental Status, Level of Consciousness: Alert and  oriented to person, place, and time    Pulmonary Status:   O2 Device: Room air (04/20/20 1324)   Adequate oxygenation and airway patent    Complications related to anesthesia: None    Post-anesthesia assessment completed. No concerns    Signed By: Andrew Saenz MD     April 20, 2020              Procedure(s):  EGD (ESSENTIAL)  ESOPHAGOGASTRODUODENAL (EGD) BIOPSY  ESOPHAGEAL DILATION.     MAC    <BSHSIANPOST>    Vitals Value Taken Time   /54 4/20/2020  1:24 PM   Temp 36.6 °C (97.9 °F) 4/20/2020  1:14 PM   Pulse 112 4/20/2020  1:24 PM   Resp 0 4/20/2020  1:24 PM   SpO2 96 % 4/20/2020  1:24 PM

## 2020-04-20 NOTE — DISCHARGE INSTRUCTIONS
Belkis Campbell  [id number]  1965    EGD DISCHARGE INSTRUCTIONS  Discomfort:  Sore throat- throat lozenges or warm salt water gargle  redness at IV site- apply warm compress to area; if redness or soreness persist- contact your physician  Gaseous discomfort- walking, belching will help relieve any discomfort  You may not operate a vehicle for 12 hours  You may not engage in an occupation involving machinery or appliances for rest of today. You may not drink alcoholic beverages for at least 12 hours  Avoid making any critical decisions for at least 24 hour  DIET  You may resume your regular diet - however -  remember your colon is empty and a heavy meal will produce gas. Avoid these foods:  vegetables, fried / greasy foods, carbonated drinks  ACTIVITY  You may resume your normal daily activities. Spend the remainder of the day resting -  avoid any strenuous activity. CALL M.D. ANY SIGN OF   Increasing pain, nausea, vomiting  Abdominal distension (swelling)  New increased bleeding (oral or rectal)  Fever (chills)  Pain in chest area  Bloody discharge from nose or mouth  Shortness of breath    You may not take any Advil, Aspirin, Ibuprofen, Motrin, Aleve, or Goodys for 10 days, ONLY  Tylenol as needed for pain. Follow-up Instructions:   Call family doctor's office to make appointment possible pharyngitis and asthma  Office telephone for problems or questions [phone]  Results of procedure / biopsy in 10-14 days   -Acid suppression with a proton pump inhibitor. , -Await NAA test result and treat for Helicobacter pylori if positive. , -No NSAIDS, -see your primary care doctor for possible pharyngitis, call office to schedule esophagogram xray of your esophagus. No definite blockage maybe throat infection      Patient Education        Gastritis: Care Instructions  Your Care Instructions    Gastritis is a sore and upset stomach. It happens when something irritates the stomach lining.  Many things can cause it. These include an infection such as the flu or something you ate or drank. Medicines or a sore on the lining of the stomach (ulcer) also can cause it. Your belly may bloat and ache. You may belch, vomit, and feel sick to your stomach. You should be able to relieve the problem by taking medicine. And it may help to change your diet. If gastritis lasts, your doctor may prescribe medicine. Follow-up care is a key part of your treatment and safety. Be sure to make and go to all appointments, and call your doctor if you are having problems. It's also a good idea to know your test results and keep a list of the medicines you take. How can you care for yourself at home? · If your doctor prescribed antibiotics, take them as directed. Do not stop taking them just because you feel better. You need to take the full course of antibiotics. · Be safe with medicines. If your doctor prescribed medicine to decrease stomach acid, take it as directed. Call your doctor if you think you are having a problem with your medicine. · Do not take any other medicine, including over-the-counter pain relievers, without talking to your doctor first.  · If your doctor recommends over-the-counter medicine to reduce stomach acid, such as Pepcid AC, Prilosec, Tagamet HB, or Zantac 75, follow the directions on the label. · Drink plenty of fluids (enough so that your urine is light yellow or clear like water) to prevent dehydration. Choose water and other caffeine-free clear liquids. If you have kidney, heart, or liver disease and have to limit fluids, talk with your doctor before you increase the amount of fluids you drink. · Limit how much alcohol you drink. · Avoid coffee, tea, cola drinks, chocolate, and other foods with caffeine. They increase stomach acid. When should you call for help? Call 911 anytime you think you may need emergency care.  For example, call if:    · You vomit blood or what looks like coffee grounds.     · You pass maroon or very bloody stools.    Call your doctor now or seek immediate medical care if:    · You start breathing fast and have not produced urine in the last 8 hours.     · You cannot keep fluids down.    Watch closely for changes in your health, and be sure to contact your doctor if:    · You do not get better as expected. Where can you learn more? Go to http://tin-viv.info/  Enter Z536 in the search box to learn more about \"Gastritis: Care Instructions. \"  Current as of: August 11, 2019Content Version: 12.4  © 3128-3115 [employer]. Care instructions adapted under license by [employer] (which disclaims liability or warranty for this information). If you have questions about a medical condition or this instruction, always ask your healthcare professional. [address] any warranty or liability for your use of this information. Patient Education        Learning About Swallowing Problems  What are swallowing problems? Certain health problems that affect the nervous system can cause trouble swallowing. These conditions include stroke, ALS (also known as Erica Gehrig's disease), Parkinson's disease, and multiple sclerosis. The muscles and nerves that help move food through the throat and esophagus may not work right. Growths, such as cancer, and other problems with your esophagus can also make it hard to swallow. The esophagus is the tube that leads from your throat to your stomach. How are swallowing problems diagnosed? A doctor or speech therapist will examine you to check for swallowing problems. You may get swallowing tests to check how well your throat muscles work. For these tests, you swallow a special liquid that helps the doctor see your throat and esophagus on an X-ray or video screen. Other tests use a thin, flexible tube called a scope to check for problems with your esophagus.  The doctor puts the scope in your mouth and down your throat to look at your esophagus. What are the symptoms? Symptoms of swallowing problems may include:  · Trouble getting food or liquids to go down on the first try. · Gagging, choking, or coughing when you swallow. · Having food or liquids come back up through your throat, mouth, or nose after you swallow. · Feeling like foods or liquids are stuck in some part of your throat or chest.  · Pain when you swallow. How are swallowing problems treated? How swallowing problems are treated depends on the cause. The main goals of treatment will be to help you eat and swallow safely and get good nutrition. This is important for your health and quality of life. You may learn exercises to train your throat muscles to work together so you're able to swallow better. Learning certain ways to put food in your mouth or to position your head while eating may also help. Your doctor or a speech therapist may recommend changes to your diet to help make it easier to swallow. You may need to avoid certain foods or liquids. You also may need to change the thickness of foods or liquids in your diet. To eat and swallow safely, follow any instructions you get from your doctor or therapist. These ideas may help:  · Sit upright when eating, drinking, and taking pills. · Take small bites of food. Chew completely and swallow before taking another bite. · Take small sips of liquids. · If eating makes you tired, eat smaller but more frequent meals. · Tip your chin down when there is food in your mouth. Where can you learn more? Go to http://tin-viv.info/  Enter D018 in the search box to learn more about \"Learning About Swallowing Problems. \"  Current as of: June 26, 2019Content Version: 12.4  © 2171-3619 [employer]. Care instructions adapted under license by [employer] (which disclaims liability or warranty for this information).  If you have questions about a medical condition or this instruction, always ask your healthcare professional. [place] [address] any warranty or liability for your use of this information.

## 2020-05-26 ENCOUNTER — HOSPITAL ENCOUNTER (OUTPATIENT)
Dept: GENERAL RADIOLOGY | Age: 55
Discharge: HOME | End: 2020-05-26
Attending: SPECIALIST
Payer: MEDICARE

## 2020-05-26 DIAGNOSIS — R13.12 DYSPHAGIA, OROPHARYNGEAL PHASE: ICD-10-CM

## 2020-05-26 PROCEDURE — 74220 X-RAY XM ESOPHAGUS 1CNTRST: CPT

## 2020-06-15 ENCOUNTER — HOSPITAL ENCOUNTER (EMERGENCY)
Age: 55
Discharge: HOME | End: 2020-06-15
Attending: EMERGENCY MEDICINE
Payer: MEDICARE

## 2020-06-15 ENCOUNTER — APPOINTMENT (OUTPATIENT)
Dept: CT IMAGING | Age: 55
End: 2020-06-15
Attending: EMERGENCY MEDICINE
Payer: MEDICARE

## 2020-06-15 VITALS
OXYGEN SATURATION: 100 % | RESPIRATION RATE: 18 BRPM | WEIGHT: 223.55 LBS | HEART RATE: 82 BPM | BODY MASS INDEX: 43.89 KG/M2 | TEMPERATURE: 97.4 F | HEIGHT: 60 IN | DIASTOLIC BLOOD PRESSURE: 69 MMHG | SYSTOLIC BLOOD PRESSURE: 144 MMHG

## 2020-06-15 DIAGNOSIS — E87.6 ACUTE HYPOKALEMIA: ICD-10-CM

## 2020-06-15 DIAGNOSIS — R10.31 ABDOMINAL PAIN, RLQ: ICD-10-CM

## 2020-06-15 DIAGNOSIS — R11.0 NAUSEA WITHOUT VOMITING: ICD-10-CM

## 2020-06-15 DIAGNOSIS — R10.9 ACUTE ABDOMINAL PAIN: ICD-10-CM

## 2020-06-15 DIAGNOSIS — K63.89 EPIPLOIC APPENDAGITIS: Primary | ICD-10-CM

## 2020-06-15 LAB
ALBUMIN SERPL-MCNC: 3.7 G/DL (ref 3.5–5)
ALBUMIN/GLOB SERPL: 0.9 {RATIO} (ref 1.1–2.2)
ALP SERPL-CCNC: 101 U/L (ref 45–117)
ALT SERPL-CCNC: 49 U/L (ref 12–78)
AMORPH CRY URNS QL MICRO: ABNORMAL
ANION GAP SERPL CALC-SCNC: 7 MMOL/L (ref 5–15)
APPEARANCE UR: ABNORMAL
AST SERPL-CCNC: 27 U/L (ref 15–37)
BACTERIA URNS QL MICRO: NEGATIVE /HPF
BASOPHILS # BLD: 0 K/UL (ref 0–0.1)
BASOPHILS NFR BLD: 1 % (ref 0–1)
BILIRUB SERPL-MCNC: 0.3 MG/DL (ref 0.2–1)
BILIRUB UR QL: NEGATIVE
BUN SERPL-MCNC: 12 MG/DL (ref 6–20)
BUN/CREAT SERPL: 12 (ref 12–20)
CALCIUM SERPL-MCNC: 8.9 MG/DL (ref 8.5–10.1)
CAOX CRY URNS QL MICRO: ABNORMAL
CHLORIDE SERPL-SCNC: 110 MMOL/L (ref 97–108)
CO2 SERPL-SCNC: 23 MMOL/L (ref 21–32)
COLOR UR: ABNORMAL
CREAT SERPL-MCNC: 1 MG/DL (ref 0.55–1.02)
DIFFERENTIAL METHOD BLD: ABNORMAL
EOSINOPHIL # BLD: 0.1 K/UL (ref 0–0.4)
EOSINOPHIL NFR BLD: 2 % (ref 0–7)
EPITH CASTS URNS QL MICRO: ABNORMAL /LPF
ERYTHROCYTE [DISTWIDTH] IN BLOOD BY AUTOMATED COUNT: 19.1 % (ref 11.5–14.5)
GLOBULIN SER CALC-MCNC: 3.9 G/DL (ref 2–4)
GLUCOSE SERPL-MCNC: 127 MG/DL (ref 65–100)
GLUCOSE UR STRIP.AUTO-MCNC: NEGATIVE MG/DL
HCT VFR BLD AUTO: 38.1 % (ref 35–47)
HGB BLD-MCNC: 12.7 G/DL (ref 11.5–16)
HGB UR QL STRIP: NEGATIVE
IMM GRANULOCYTES # BLD AUTO: 0 K/UL (ref 0–0.04)
IMM GRANULOCYTES NFR BLD AUTO: 0 % (ref 0–0.5)
KETONES UR QL STRIP.AUTO: NEGATIVE MG/DL
LEUKOCYTE ESTERASE UR QL STRIP.AUTO: ABNORMAL
LIPASE SERPL-CCNC: 222 U/L (ref 73–393)
LYMPHOCYTES # BLD: 2.2 K/UL (ref 0.8–3.5)
LYMPHOCYTES NFR BLD: 39 % (ref 12–49)
MCH RBC QN AUTO: 26.8 PG (ref 26–34)
MCHC RBC AUTO-ENTMCNC: 33.3 G/DL (ref 30–36.5)
MCV RBC AUTO: 80.5 FL (ref 80–99)
MONOCYTES # BLD: 0.4 K/UL (ref 0–1)
MONOCYTES NFR BLD: 7 % (ref 5–13)
NEUTS SEG # BLD: 2.9 K/UL (ref 1.8–8)
NEUTS SEG NFR BLD: 51 % (ref 32–75)
NITRITE UR QL STRIP.AUTO: NEGATIVE
NRBC # BLD: 0 K/UL (ref 0–0.01)
NRBC BLD-RTO: 0 PER 100 WBC
PH UR STRIP: 6.5 [PH] (ref 5–8)
PLATELET # BLD AUTO: 228 K/UL (ref 150–400)
PMV BLD AUTO: 10 FL (ref 8.9–12.9)
POTASSIUM SERPL-SCNC: 3.2 MMOL/L (ref 3.5–5.1)
PROT SERPL-MCNC: 7.6 G/DL (ref 6.4–8.2)
PROT UR STRIP-MCNC: NEGATIVE MG/DL
RBC # BLD AUTO: 4.73 M/UL (ref 3.8–5.2)
RBC #/AREA URNS HPF: ABNORMAL /HPF (ref 0–5)
SODIUM SERPL-SCNC: 140 MMOL/L (ref 136–145)
SP GR UR REFRACTOMETRY: 1.02 (ref 1–1.03)
UA: UC IF INDICATED,UAUC: ABNORMAL
UROBILINOGEN UR QL STRIP.AUTO: 1 EU/DL (ref 0.2–1)
WBC # BLD AUTO: 5.7 K/UL (ref 3.6–11)
WBC URNS QL MICRO: ABNORMAL /HPF (ref 0–4)

## 2020-06-15 PROCEDURE — 96374 THER/PROPH/DIAG INJ IV PUSH: CPT

## 2020-06-15 PROCEDURE — 85025 COMPLETE CBC W/AUTO DIFF WBC: CPT

## 2020-06-15 PROCEDURE — 83690 ASSAY OF LIPASE: CPT

## 2020-06-15 PROCEDURE — 74177 CT ABD & PELVIS W/CONTRAST: CPT

## 2020-06-15 PROCEDURE — 80053 COMPREHEN METABOLIC PANEL: CPT

## 2020-06-15 PROCEDURE — 96375 TX/PRO/DX INJ NEW DRUG ADDON: CPT

## 2020-06-15 PROCEDURE — 36415 COLL VENOUS BLD VENIPUNCTURE: CPT

## 2020-06-15 PROCEDURE — 74011250636 HC RX REV CODE- 250/636: Performed by: EMERGENCY MEDICINE

## 2020-06-15 PROCEDURE — 99283 EMERGENCY DEPT VISIT LOW MDM: CPT

## 2020-06-15 PROCEDURE — 81001 URINALYSIS AUTO W/SCOPE: CPT

## 2020-06-15 PROCEDURE — 74011636320 HC RX REV CODE- 636/320: Performed by: EMERGENCY MEDICINE

## 2020-06-15 RX ORDER — ONDANSETRON 2 MG/ML
4 INJECTION INTRAMUSCULAR; INTRAVENOUS
Status: COMPLETED | OUTPATIENT
Start: 2020-06-15 | End: 2020-06-15

## 2020-06-15 RX ORDER — POTASSIUM CHLORIDE 750 MG/1
40 TABLET, FILM COATED, EXTENDED RELEASE ORAL
Status: DISCONTINUED | OUTPATIENT
Start: 2020-06-15 | End: 2020-06-15 | Stop reason: HOSPADM

## 2020-06-15 RX ORDER — NAPROXEN 500 MG/1
500 TABLET ORAL 2 TIMES DAILY WITH MEALS
Qty: 20 TAB | Refills: 0 | Status: SHIPPED | OUTPATIENT
Start: 2020-06-15 | End: 2021-01-25 | Stop reason: CLARIF

## 2020-06-15 RX ORDER — KETOROLAC TROMETHAMINE 30 MG/ML
30 INJECTION, SOLUTION INTRAMUSCULAR; INTRAVENOUS
Status: DISCONTINUED | OUTPATIENT
Start: 2020-06-15 | End: 2020-06-15 | Stop reason: HOSPADM

## 2020-06-15 RX ORDER — DICYCLOMINE HYDROCHLORIDE 10 MG/1
10 CAPSULE ORAL 4 TIMES DAILY
Qty: 20 CAP | Refills: 0 | Status: SHIPPED | OUTPATIENT
Start: 2020-06-15 | End: 2020-06-20

## 2020-06-15 RX ORDER — SODIUM CHLORIDE 0.9 % (FLUSH) 0.9 %
10 SYRINGE (ML) INJECTION
Status: COMPLETED | OUTPATIENT
Start: 2020-06-15 | End: 2020-06-15

## 2020-06-15 RX ORDER — MORPHINE SULFATE 2 MG/ML
4 INJECTION, SOLUTION INTRAMUSCULAR; INTRAVENOUS
Status: COMPLETED | OUTPATIENT
Start: 2020-06-15 | End: 2020-06-15

## 2020-06-15 RX ADMIN — MORPHINE SULFATE 4 MG: 2 INJECTION, SOLUTION INTRAMUSCULAR; INTRAVENOUS at 04:59

## 2020-06-15 RX ADMIN — IOPAMIDOL 100 ML: 755 INJECTION, SOLUTION INTRAVENOUS at 05:17

## 2020-06-15 RX ADMIN — ONDANSETRON 4 MG: 2 INJECTION INTRAMUSCULAR; INTRAVENOUS at 04:59

## 2020-06-15 RX ADMIN — Medication 10 ML: at 05:17

## 2020-06-15 NOTE — DISCHARGE INSTRUCTIONS

## 2020-06-15 NOTE — LETTER
[address] 
[facility] EMERGENCY DEPT 
[address] [place] [address] [place]ina [id number] 
[phone] Work/School Note Date: 6/15/2020 To Whom It May concern: 
 
Shayna Ramirez was seen and treated today in the emergency room by the following provider(s): 
Attending Provider: Marco Floyd MD. Shayna Ramirez may return to work on 6/17/20. Sincerely, Ursula Valerio MD

## 2020-06-15 NOTE — ED PROVIDER NOTES
EMERGENCY DEPARTMENT HISTORY AND PHYSICAL EXAM      Please note that this dictation was completed with [employer], the computer voice recognition software. Quite often unanticipated grammatical, syntax, homophones, and other interpretive errors are inadvertently transcribed by the computer software. Please disregard these errors and any errors that have escaped final proofreading. Thank you. Date: 6/15/2020  Patient Name: Del Constantino  Patient Age and Sex: 47 y.o. female    History of Presenting Illness     Chief Complaint   Patient presents with    Abdominal Pain     Abdominal pain since about 1600 yesterday - right lower quadrant. History Provided By: Patient    HPI: Del Constantino, 47 y.o. female with past medical history as documented below presents to the ED with c/o of acute onset of moderate to severe right lower quadrant pain onset around 4 PM yesterday afternoon or approximately 12 hours ago. Patient states that the pain has been intermittent, throbbing in quality. Patient states that she took over-the-counter BC powder without significant relief of symptoms. She notes associated nausea but no emesis. She denies any diarrhea, fever, anorexia. She denies any history of ovarian cyst or gynecologic pathology. She denies any urinary symptoms. She reports pain is worse with movement and palpation. Pt denies any other alleviating or exacerbating factors. Additionally, pt specifically denies any recent fever, chills, headache, CP, SOB, lightheadedness, dizziness, numbness, weakness, lower extremity swelling, heart palpitations, urinary sxs, diarrhea, constipation, melena, hematochezia, cough, or congestion. There are no other complaints, changes or physical findings at this time.      PCP: Kemar Schafer MD    Past History   Past Medical History:  Past Medical History:   Diagnosis Date    Chronic pelvic pain in female 7/9/2014    Depression     Diabetes ([facility] [address]     Hypertension     Obesity (BMI 35.0-39.9 without comorbidity)     SHAGUFTA on CPAP 3/9/2017    Posttraumatic stress disorder     Psychiatric disorder     depression    Recurrent major depression ([facility] [address] 2010    S/P ventral herniorrhaphy 2018    Suicidal thoughts     Thyroid nodule 10/1/2015    Unspecified sleep apnea     uses cpap       Past Surgical History:  Past Surgical History:   Procedure Laterality Date    ABDOMEN SURGERY PROC UNLISTED      cholecystectomy    HX  SECTION      HX  SECTION      HX HEENT      head and neck surgery     HX HERNIA REPAIR  2018    incarcerated ventral hernia repair with mesh    HX HYSTERECTOMY      HX ORTHOPAEDIC      L ankle surgery     HX ORTHOPAEDIC Left     TOTAL HIP REPLACEMENT    HX TUBAL LIGATION      PPBTL       Family History:  Family History   Problem Relation Age of Onset    Cancer Mother 61        Breast cancer   [facility] Breast Cancer Mother 48    Heart Disease Father 50        M. I.   [facility] Asthma Sister     Heart Disease Brother     Hypertension Brother     Hypertension Brother     Diabetes Brother     Lung Disease Brother         COPD       Social History:  Social History     Tobacco Use    Smoking status: Former Smoker     Packs/day: 2.00     Years: 30.00     Pack years: 60.00     Types: Cigarettes     Last attempt to quit: 2019     Years since quittin.8    Smokeless tobacco: Never Used   Substance Use Topics    Alcohol use: Never     Frequency: Never     Binge frequency: Never    Drug use: Never       Allergies: Allergies   Allergen Reactions    Zantac [Ranitidine Hcl] Hives    Zantac [Ranitidine Hcl] Hives       Current Medications:  No current facility-administered medications on file prior to encounter.       Current Outpatient Medications on File Prior to Encounter   Medication Sig Dispense Refill    topiramate (TOPAMAX) 50 mg tablet       phentermine 37.5 mg capsule take 1 capsule by mouth every morning      pramipexole (MIRAPEX) 0.25 mg tablet       lisinopril (PRINIVIL, ZESTRIL) 10 mg tablet       VICTOZA 3-MARILIN 0.6 mg/0.1 mL (18 mg/3 mL) pnij       BREO ELLIPTA 200-25 mcg/dose inhaler       ADVAIR DISKUS 250-50 mcg/dose diskus inhaler       buPROPion XL (WELLBUTRIN XL) 150 mg tablet       cyclobenzaprine (FLEXERIL) 10 mg tablet Take 1 Tab by mouth three (3) times daily as needed for Muscle Spasm(s). 15 Tab 0    mirabegron ER (MYRBETRIQ) 25 mg ER tablet Take 25 mg by mouth daily.  metoprolol succinate (TOPROL-XL) 25 mg XL tablet Take 1 Tab by mouth daily. 30 Tab 3    atorvastatin (LIPITOR) 20 mg tablet  20 mg = 1 tab each dose, PO, bedtime, 0 Refills      metFORMIN ER (GLUCOPHAGE XR) 500 mg tablet   0    omeprazole (PRILOSEC) 40 mg capsule Take 1 capsule by mouth two (2) times a day. 60 capsule 2    albuterol (PROVENTIL HFA, VENTOLIN HFA) 90 mcg/actuation inhaler Take 2 Puffs by inhalation as needed.  ARIPiprazole (ABILIFY) 10 mg tablet Take 10 mg by mouth daily. Review of Systems   Review of Systems   Constitutional: Negative. Negative for chills and fever. HENT: Negative. Negative for congestion, facial swelling, rhinorrhea, sore throat, trouble swallowing and voice change. Eyes: Negative. Respiratory: Negative. Negative for apnea, cough, chest tightness, shortness of breath and wheezing. Cardiovascular: Negative. Negative for chest pain, palpitations and leg swelling. Gastrointestinal: Positive for abdominal pain and nausea. Negative for abdominal distention, blood in stool, constipation, diarrhea and vomiting. Endocrine: Negative. Negative for cold intolerance, heat intolerance and polyuria. Genitourinary: Negative. Negative for difficulty urinating, dysuria, flank pain, frequency, hematuria and urgency. Musculoskeletal: Negative. Negative for arthralgias, back pain, myalgias, neck pain and neck stiffness. Skin: Negative. Negative for color change and rash. Neurological: Negative. Negative for dizziness, syncope, facial asymmetry, speech difficulty, weakness, light-headedness, numbness and headaches. Hematological: Negative. Does not bruise/bleed easily. Psychiatric/Behavioral: Negative. Negative for confusion and self-injury. The patient is not nervous/anxious. Physical Exam   Physical Exam  Vitals signs and nursing note reviewed. Constitutional:       General: She is not in acute distress. Appearance: She is well-developed. She is not diaphoretic. HENT:      Head: Normocephalic and atraumatic. Mouth/Throat:      Pharynx: No oropharyngeal exudate. Eyes:      Conjunctiva/sclera: Conjunctivae normal.      Pupils: Pupils are equal, round, and reactive to light. Neck:      Musculoskeletal: Normal range of motion. Cardiovascular:      Rate and Rhythm: Normal rate and regular rhythm. Heart sounds: Normal heart sounds. No murmur. No friction rub. No gallop. Pulmonary:      Effort: Pulmonary effort is normal. No respiratory distress. Breath sounds: Normal breath sounds. No wheezing or rales. Chest:      Chest wall: No tenderness. Abdominal:      General: Bowel sounds are normal. There is no distension. Palpations: Abdomen is soft. There is no mass. Tenderness: There is abdominal tenderness in the right lower quadrant. There is no right CVA tenderness, left CVA tenderness, guarding or rebound. Negative signs include Song's sign, Rovsing's sign, McBurney's sign, psoas sign and obturator sign. Musculoskeletal: Normal range of motion. General: No tenderness or deformity. Skin:     General: Skin is warm. Findings: No rash. Neurological:      Mental Status: She is alert and oriented to person, place, and time. Cranial Nerves: No cranial nerve deficit. Motor: No abnormal muscle tone.       Coordination: Coordination normal.      Deep Tendon Reflexes: Reflexes normal.         Diagnostic Study Results     Labs -  Recent Results (from the past 24 hour(s))   METABOLIC PANEL, COMPREHENSIVE    Collection Time: 06/15/20  3:55 AM   Result Value Ref Range    Sodium 140 136 - 145 mmol/L    Potassium 3.2 (L) 3.5 - 5.1 mmol/L    Chloride 110 (H) 97 - 108 mmol/L    CO2 23 21 - 32 mmol/L    Anion gap 7 5 - 15 mmol/L    Glucose 127 (H) 65 - 100 mg/dL    BUN 12 6 - 20 MG/DL    Creatinine 1.00 0.55 - 1.02 MG/DL    BUN/Creatinine ratio 12 12 - 20      GFR est AA >60 >60 ml/min/1.73m2    GFR est non-AA 58 (L) >60 ml/min/1.73m2    Calcium 8.9 8.5 - 10.1 MG/DL    Bilirubin, total 0.3 0.2 - 1.0 MG/DL    ALT (SGPT) 49 12 - 78 U/L    AST (SGOT) 27 15 - 37 U/L    Alk.  phosphatase 101 45 - 117 U/L    Protein, total 7.6 6.4 - 8.2 g/dL    Albumin 3.7 3.5 - 5.0 g/dL    Globulin 3.9 2.0 - 4.0 g/dL    A-G Ratio 0.9 (L) 1.1 - 2.2     URINALYSIS W/ REFLEX CULTURE    Collection Time: 06/15/20  3:55 AM   Result Value Ref Range    Color YELLOW/STRAW      Appearance CLOUDY (A) CLEAR      Specific gravity 1.021 1.003 - 1.030      pH (UA) 6.5 5.0 - 8.0      Protein Negative NEG mg/dL    Glucose Negative NEG mg/dL    Ketone Negative NEG mg/dL    Bilirubin Negative NEG      Blood Negative NEG      Urobilinogen 1.0 0.2 - 1.0 EU/dL    Nitrites Negative NEG      Leukocyte Esterase TRACE (A) NEG      WBC 5-10 0 - 4 /hpf    RBC 0-5 0 - 5 /hpf    Epithelial cells FEW FEW /lpf    Bacteria Negative NEG /hpf    UA:UC IF INDICATED CULTURE NOT INDICATED BY UA RESULT CNI      Amorphous Crystals 1+ (A) NEG    CA Oxalate crystals 2+ (A) NEG   CBC WITH AUTOMATED DIFF    Collection Time: 06/15/20  3:55 AM   Result Value Ref Range    WBC 5.7 3.6 - 11.0 K/uL    RBC 4.73 3.80 - 5.20 M/uL    HGB 12.7 11.5 - 16.0 g/dL    HCT 38.1 35.0 - 47.0 %    MCV 80.5 80.0 - 99.0 FL    MCH 26.8 26.0 - 34.0 PG    MCHC 33.3 30.0 - 36.5 g/dL    RDW 19.1 (H) 11.5 - 14.5 %    PLATELET 623 476 - 499 K/uL    MPV 10.0 8.9 - 12.9 FL    NRBC 0.0 0  WBC    ABSOLUTE NRBC 0.00 0.00 - 0.01 K/uL    NEUTROPHILS 51 32 - 75 %    LYMPHOCYTES 39 12 - 49 %    MONOCYTES 7 5 - 13 %    EOSINOPHILS 2 0 - 7 %    BASOPHILS 1 0 - 1 %    IMMATURE GRANULOCYTES 0 0.0 - 0.5 %    ABS. NEUTROPHILS 2.9 1.8 - 8.0 K/UL    ABS. LYMPHOCYTES 2.2 0.8 - 3.5 K/UL    ABS. MONOCYTES 0.4 0.0 - 1.0 K/UL    ABS. EOSINOPHILS 0.1 0.0 - 0.4 K/UL    ABS. BASOPHILS 0.0 0.0 - 0.1 K/UL    ABS. IMM. GRANS. 0.0 0.00 - 0.04 K/UL    DF AUTOMATED     LIPASE    Collection Time: 06/15/20  3:55 AM   Result Value Ref Range    Lipase 222 73 - 393 U/L       Radiologic Studies -   CT ABD PELV W CONT   Final Result   IMPRESSION: Cecal epiploic appendagitis. CT Results  (Last 48 hours)               06/15/20 0518  CT ABD PELV W CONT Final result    Impression:  IMPRESSION: Cecal epiploic appendagitis. Narrative:  INDICATION: Acute right lower quadrant abdominal pain, evaluate for   appendicitis. CT of the abdomen and pelvis is performed with 5 mm collimation. Study is   performed with 100 cc of nonionic Isovue 370. Sagittal and coronal reformatted   images were also performed. CT dose reduction was achieved with the use of the standardized protocol   tailored for this examination and automatic exposure control for dose   modulation. Direct comparison is made to prior CT dated October 2019. Findings:       Lung bases: The visualized lung bases are clear. Liver: The liver is diffusely echogenic. Adrenals: Adrenal glands are normal.       Pancreas: The pancreas is normal.       Gallbladder: The gallbladder is surgically absent. Kidneys: There is a 1 mm nonobstructing left renal calculus. There is a 6 mm   nonobstructing right renal calculus. Kidneys otherwise enhance promptly and   symmetrically. There is no hydronephrosis. Spleen: The spleen is normal.       Lymph nodes. There is no alena hepatitis, mesenteric, retroperitoneal or pelvic   lymphadenopathy.        Bowel: No thickened or dilated loop of large or small bowel is visualized. There   is mild stranding adjacent to a cecal epiploic appendage. Appendix: There is no acute appendicitis. Urinary bladder: Urinary bladder is partially filled and grossly normal.       Miscellaneous: There is no free intraperitoneal fluid or gas. There is no focal   fluid collection to suggest abscess. There is regarding artifact in the pelvis   secondary to left hip prosthesis. There are severe right hip osteoarthritic   degenerative changes. The uterus is absent. CXR Results  (Last 48 hours)    None          Medical Decision Making   I am the first provider for this patient. I reviewed the vital signs, available nursing notes, past medical history, past surgical history, family history and social history. Vital Signs-Reviewed the patient's vital signs. Patient Vitals for the past 24 hrs:   Temp Pulse Resp BP SpO2   06/15/20 0346 97.4 °F (36.3 °C) 82 18 144/69 100 %       Pulse Oximetry Analysis - 100% on RA    Cardiac Monitor:   Rate: 82 bpm  Rhythm: Normal Sinus Rhythm      Records Reviewed: Nursing Notes, Old Medical Records, Previous electrocardiograms, Previous Radiology Studies and Previous Laboratory Studies    Provider Notes (Medical Decision Making):   Pt presents with acute abdominal pain; vital signs stable with currently a non-peritoneal exam; DDx includes: Gastroenteritis, hepatitis, pancreatitis, obstruction, appendicitis, viral illness, IBD, diverticulitis, mesenteric ischemia, AAA or descending dissection, ACS, kidney stone. Will get labs, treat symptomatically and obtain serial abdominal exams to determine if additional imaging is indicated. Will reassess and monitor closely. ED Course:   Initial assessment performed. The patients presenting problems have been discussed, and they are in agreement with the care plan formulated and outlined with them.   I have encouraged them to ask questions as they arise throughout their visit. HYPERTENSION COUNSELING  For 10 minutes, education was provided to the patient today regarding their hypertension. Patient is made aware of their elevated blood pressure and is instructed to follow up this week with their Primary Care for a recheck. Patient is counseled regarding consequences of chronic, uncontrolled hypertension including kidney disease, heart disease, stroke or even death. Patient states their understanding and agrees to follow up this week. Additionally, during their visit, I discussed sodium restriction, maintaining ideal body weight and regular exercise program as physiologic means to achieve blood pressure control. The patient will strive towards this. I reviewed our electronic medical record system for any past medical records that were available that may contribute to the patient's current condition, the nursing notes and vital signs from today's visit.   Cassius David MD    ED Orders Placed :  Orders Placed This Encounter    CT ABD PELV W CONT    METABOLIC PANEL, COMPREHENSIVE    URINALYSIS W/ REFLEX CULTURE    DIET NPO    ABD PAIN BELOW PANEL TRACKING (DO NOT DESELECT)    PO CHALLENGE    INSERT PERIPHERAL IV ONE TIME STAT    morphine injection 4 mg    DISCONTD: sodium chloride 0.9 % bolus infusion 1,000 mL    ondansetron (ZOFRAN) injection 4 mg    sodium chloride (NS) flush 10 mL    iopamidoL (ISOVUE-370) 76 % injection 100 mL    ketorolac (TORADOL) injection 30 mg    potassium chloride SR (KLOR-CON 10) tablet 40 mEq    naproxen (NAPROSYN) 500 mg tablet    dicyclomine (BentyL) 10 mg capsule     ED Medications Administered:  Medications   ketorolac (TORADOL) injection 30 mg (has no administration in time range)   potassium chloride SR (KLOR-CON 10) tablet 40 mEq (has no administration in time range)   morphine injection 4 mg (4 mg IntraVENous Given 6/15/20 0459)   ondansetron (ZOFRAN) injection 4 mg (4 mg IntraVENous Given 6/15/20 0459)   sodium chloride (NS) flush 10 mL (10 mL IntraVENous Given 6/15/20 0517)   iopamidoL (ISOVUE-370) 76 % injection 100 mL (100 mL IntraVENous Given 6/15/20 0517)         Progress Note:  I have re-examined the patient. Pt states she feels much better and symptoms improved. Tolerating oral intake. Abdomen is soft and without guarding, rebound or other peritoneal signs. I have discussed with patient the importance of close f/u and to return to the ED if symptoms don't improve or worsen. Progress Note:  Patient has been reassessed and reports feeling better and symptoms have improved significantly after ED treatment. Patient feels comfortable going home with close follow-up. Kishore Babin's final labs and imaging have been reviewed with her and available family and/or caregiver. They have been counseled regarding her diagnosis. She verbally conveys understanding and agreement of the signs, symptoms, diagnosis, treatment and prognosis and additionally agrees to follow up as recommended with Dr. Angel Heard MD and/or specialist in 24 - 48 hours. She also agrees with the care-plan we created together and conveys that all of her questions have been answered. I have also put together some discharge instructions for her that include: 1) educational information regarding their diagnosis, 2) how to care for their diagnosis at home, as well a 3) list of reasons why they would want to return to the ED prior to their follow-up appointment should the patient's condition change or symptoms worsen. I have answered all questions to the patient's satisfaction. Strict return precautions given. She both understood and agreed with plan as discussed. Vital signs stable for discharge. Pt very appreciative of care today. Disposition: Discharge  The pt is ready for discharge. The pt's signs, symptoms, diagnosis, and discharge instructions have been discussed and pt has conveyed their understanding.  The pt is to follow up as recommended or return to ER should their symptoms worsen. Plan has been discussed and pt is in full agreement. Plan:  1. Return precautions as discussed. 2.   Discharge Medication List as of 6/15/2020  5:57 AM      START taking these medications    Details   naproxen (NAPROSYN) 500 mg tablet Take 1 Tab by mouth two (2) times daily (with meals). , Print, Disp-20 Tab, R-0      dicyclomine (BentyL) 10 mg capsule Take 1 Cap by mouth four (4) times daily for 5 days. , Print, Disp-20 Cap, R-0         CONTINUE these medications which have NOT CHANGED    Details   topiramate (TOPAMAX) 50 mg tablet Historical Med      phentermine 37.5 mg capsule take 1 capsule by mouth every morning, Historical Med      pramipexole (MIRAPEX) 0.25 mg tablet Historical Med      lisinopril (PRINIVIL, ZESTRIL) 10 mg tablet Historical Med      VICTOZA 3-MARILIN 0.6 mg/0.1 mL (18 mg/3 mL) pnij Historical Med, [place]      BREO ELLIPTA 200-25 mcg/dose inhaler Historical Med, [place]      ADVAIR DISKUS 250-50 mcg/dose diskus inhaler Historical Med, [place]      buPROPion XL (WELLBUTRIN XL) 150 mg tablet Historical Med      cyclobenzaprine (FLEXERIL) 10 mg tablet Take 1 Tab by mouth three (3) times daily as needed for Muscle Spasm(s). , Normal, Disp-15 Tab, R-0      mirabegron ER (MYRBETRIQ) 25 mg ER tablet Take 25 mg by mouth daily. , Historical Med      metoprolol succinate (TOPROL-XL) 25 mg XL tablet Take 1 Tab by mouth daily. , Normal, Disp-30 Tab, R-3      atorvastatin (LIPITOR) 20 mg tablet  20 mg = 1 tab each dose, PO, bedtime, 0 Refills, Historical Med      metFORMIN ER (GLUCOPHAGE XR) 500 mg tablet Historical Med, R-0      omeprazole (PRILOSEC) 40 mg capsule Take 1 capsule by mouth two (2) times a day., Print, Disp-60 capsule, R-2      albuterol (PROVENTIL HFA, VENTOLIN HFA) 90 mcg/actuation inhaler Take 2 Puffs by inhalation as needed., Historical Med      ARIPiprazole (ABILIFY) 10 mg tablet Take 10 mg by mouth daily. , Historical Med           3. Follow-up Information     Follow up With Specialties Details Why Contact Info    Quita Tyson, [address]   [address]  [address] [place] [address] [phone]      [facility] EMERGENCY DEPT Emergency Medicine  As needed, If symptoms worsen [address] [facility]  [address] [facility]  [phone]          Instructed to return to ED if worse  Diagnosis     Clinical Impression:   1. Epiploic appendagitis    2. Acute abdominal pain    3. Abdominal pain, RLQ    4. Acute hypokalemia    5. Nausea without vomiting      Attestation:  Danette Nina MD, am the attending of record for this patient. I personally performed the services described in this documentation on this date, 6/15/2020 for patient, Elizabeth Lincoln. I have reviewed the chart and verified that the record is accurate and complete. This note will not be viewable in [address]

## 2020-06-15 NOTE — ED NOTES
Dr. Virginie Case reviewed discharge instructions with the patient. The patient verbalized understanding.

## 2020-09-05 ENCOUNTER — HOSPITAL ENCOUNTER (EMERGENCY)
Age: 55
Discharge: HOME | End: 2020-09-05
Attending: EMERGENCY MEDICINE | Admitting: EMERGENCY MEDICINE
Payer: MEDICARE

## 2020-09-05 VITALS
RESPIRATION RATE: 18 BRPM | DIASTOLIC BLOOD PRESSURE: 77 MMHG | HEART RATE: 77 BPM | WEIGHT: 218.92 LBS | SYSTOLIC BLOOD PRESSURE: 145 MMHG | OXYGEN SATURATION: 99 % | BODY MASS INDEX: 42.98 KG/M2 | HEIGHT: 60 IN | TEMPERATURE: 98 F

## 2020-09-05 DIAGNOSIS — S05.01XA ABRASION OF RIGHT CORNEA, INITIAL ENCOUNTER: Primary | ICD-10-CM

## 2020-09-05 PROCEDURE — 99283 EMERGENCY DEPT VISIT LOW MDM: CPT

## 2020-09-05 PROCEDURE — 74011000250 HC RX REV CODE- 250: Performed by: EMERGENCY MEDICINE

## 2020-09-05 RX ORDER — ERYTHROMYCIN 5 MG/G
OINTMENT OPHTHALMIC
Qty: 1 TUBE | Refills: 0 | Status: SHIPPED | OUTPATIENT
Start: 2020-09-05 | End: 2020-09-12

## 2020-09-05 RX ORDER — TETRACAINE HYDROCHLORIDE 5 MG/ML
2 SOLUTION OPHTHALMIC
Status: COMPLETED | OUTPATIENT
Start: 2020-09-05 | End: 2020-09-05

## 2020-09-05 RX ADMIN — TETRACAINE HYDROCHLORIDE 2 DROP: 5 SOLUTION OPHTHALMIC at 22:50

## 2020-09-05 RX ADMIN — FLUORESCEIN SODIUM 1 STRIP: 1 STRIP OPHTHALMIC at 22:50

## 2020-09-06 NOTE — DISCHARGE INSTRUCTIONS
Patient Education        Corneal Scratches: Care Instructions  Your Care Instructions     The cornea is the clear surface that covers the front of the eye. When a speck of dirt, a wood chip, an insect, or another object flies into your eye, it can cause a painful scratch on the cornea. Wearing contact lenses too long or rubbing your eyes can also scratch the cornea. Small scratches usually heal in a day or two. Deeper scratches may take longer. If you have had a foreign object removed from your eye or you have a corneal scratch, you will need to watch for infection and vision problems while your eye heals. Follow-up care is a key part of your treatment and safety. Be sure to make and go to all appointments, and call your doctor if you are having problems. It's also a good idea to know your test results and keep a list of the medicines you take. How can you care for yourself at home? · The doctor probably used a medicine during your exam to numb your eye. When it wears off in 30 to 60 minutes, your eye pain may come back. Take pain medicines exactly as directed. ? If the doctor gave you a prescription medicine for pain, take it as prescribed. ? If you are not taking a prescription pain medicine, ask your doctor if you can take an over-the-counter medicine. ? Do not take two or more pain medicines at the same time unless the doctor told you to. Many pain medicines have acetaminophen, which is Tylenol. Too much acetaminophen (Tylenol) can be harmful. · Do not rub your injured eye. Rubbing can make it worse. · Use the prescribed eyedrops or ointment as directed. Be sure the dropper or bottle tip is clean. To put in eyedrops or ointment:  ? Tilt your head back, and pull your lower eyelid down with one finger. ? Drop or squirt the medicine inside the lower lid. ? Close your eye for 30 to 60 seconds to let the drops or ointment move around.   ? Do not touch the ointment or dropper tip to your eyelashes or any other surface. · Do not use your contact lens in your hurt eye until your doctor says you can. Also, do not wear eye makeup until your eye has healed. · Do not drive if you have blurred vision. · Bright light may hurt. Sunglasses can help. · To prevent eye injuries in the future, wear safety glasses or goggles when you work with machines or tools, mow the lawn, or ride a bike or motorcycle. When should you call for help? Call your doctor now or seek immediate medical care if:    · You have signs of an eye infection, such as:  ? Pus or thick discharge coming from the eye.  ? Redness or swelling around the eye.  ? A fever.     · You have new or worse eye pain.     · You have vision changes.     · It feels like there is something in your eye.     · Light hurts your eye. Watch closely for changes in your health, and be sure to contact your doctor if:    · You do not get better as expected. Where can you learn more? Go to http://www.gray.com/  Enter G403 in the search box to learn more about \"Corneal Scratches: Care Instructions. \"  Current as of: December 18, 2019               Content Version: 12.6  © 8261-4739 [employer]. Care instructions adapted under license by [employer] (which disclaims liability or warranty for this information). If you have questions about a medical condition or this instruction, always ask your healthcare professional. [place] [address] any warranty or liability for your use of this information.

## 2020-09-06 NOTE — ED PROVIDER NOTES
EMERGENCY DEPARTMENT HISTORY AND PHYSICAL EXAM      Date: 9/5/2020  Patient Name: Pam Fournier    History of Presenting Illness     Chief Complaint   Patient presents with    Eye Pain     Pt states she has rt eye pain and thinks there is something in it.  Headache       History Provided By: Patient    HPI: Pam Fournier, 47 y.o. female with PMHx significant for hypertension, depression, who presents with a chief complaint of right eye pain that began yesterday. Patient reports it feels as if there is something in her eye though she denies any recollection of a foreign body actually getting in her eye. She states she is having trouble opening her eye because it hurts. Also noted some drainage this morning. No fever, chest pain, shortness of breath. PCP: Esther Blackburn MD    There are no other complaints, changes, or physical findings at this time. Current Outpatient Medications   Medication Sig Dispense Refill    erythromycin (ILOTYCIN) ophthalmic ointment Apply 1/2 inch to the right eye four (4) times daily for 5 days 1 Tube 0    naproxen (NAPROSYN) 500 mg tablet Take 1 Tab by mouth two (2) times daily (with meals). 20 Tab 0    topiramate (TOPAMAX) 50 mg tablet       phentermine 37.5 mg capsule take 1 capsule by mouth every morning      pramipexole (MIRAPEX) 0.25 mg tablet       lisinopril (PRINIVIL, ZESTRIL) 10 mg tablet       VICTOZA 3-MARILIN 0.6 mg/0.1 mL (18 mg/3 mL) pnij       BREO ELLIPTA 200-25 mcg/dose inhaler       ADVAIR DISKUS 250-50 mcg/dose diskus inhaler       buPROPion XL (WELLBUTRIN XL) 150 mg tablet       cyclobenzaprine (FLEXERIL) 10 mg tablet Take 1 Tab by mouth three (3) times daily as needed for Muscle Spasm(s). 15 Tab 0    mirabegron ER (MYRBETRIQ) 25 mg ER tablet Take 25 mg by mouth daily.  metoprolol succinate (TOPROL-XL) 25 mg XL tablet Take 1 Tab by mouth daily.  30 Tab 3    atorvastatin (LIPITOR) 20 mg tablet  20 mg = 1 tab each dose, PO, bedtime, 0 Refills      metFORMIN ER (GLUCOPHAGE XR) 500 mg tablet   0    omeprazole (PRILOSEC) 40 mg capsule Take 1 capsule by mouth two (2) times a day. 60 capsule 2    albuterol (PROVENTIL HFA, VENTOLIN HFA) 90 mcg/actuation inhaler Take 2 Puffs by inhalation as needed.  ARIPiprazole (ABILIFY) 10 mg tablet Take 10 mg by mouth daily. Past History     Past Medical History:  Past Medical History:   Diagnosis Date    Chronic pelvic pain in female 2014    Depression     Diabetes ([facility] [address]     Hypertension     Obesity (BMI 35.0-39.9 without comorbidity)     SHAGUFTA on CPAP 3/9/2017    Posttraumatic stress disorder     Psychiatric disorder     depression    Recurrent major depression ([facility] [address] 2010    S/P ventral herniorrhaphy 2018    Suicidal thoughts     Thyroid nodule 10/1/2015    Unspecified sleep apnea     uses cpap     Past Surgical History:  Past Surgical History:   Procedure Laterality Date    ABDOMEN SURGERY PROC UNLISTED      cholecystectomy    HX  SECTION      HX  SECTION      HX HEENT      head and neck surgery     HX HERNIA REPAIR  2018    incarcerated ventral hernia repair with mesh    HX HYSTERECTOMY      HX ORTHOPAEDIC      L ankle surgery     HX ORTHOPAEDIC Left     TOTAL HIP REPLACEMENT    HX TUBAL LIGATION      PPBTL     Family History:  Family History   Problem Relation Age of Onset    Cancer Mother 61        Breast cancer   [facility] Breast Cancer Mother 48    Heart Disease Father 50        M. I.   [facility] Asthma Sister     Heart Disease Brother     Hypertension Brother     Hypertension Brother     Diabetes Brother     Lung Disease Brother         COPD     Social History:  Social History     Tobacco Use    Smoking status: Current Every Day Smoker     Packs/day: 2.00     Years: 30.00     Pack years: 60.00     Types: Cigarettes     Last attempt to quit: 2019     Years since quittin.1    Smokeless tobacco: Never Used Substance Use Topics    Alcohol use: Never     Frequency: Never     Binge frequency: Never    Drug use: Never     Allergies: Allergies   Allergen Reactions    Zantac [Ranitidine Hcl] Hives    Zantac [Ranitidine Hcl] Hives     Review of Systems   Review of Systems   Constitutional: Negative for fever. Eyes: Positive for pain, discharge and redness. Respiratory: Negative for shortness of breath. Cardiovascular: Negative for chest pain. Gastrointestinal: Negative for abdominal pain, nausea and vomiting. Physical Exam   Physical Exam  Vitals signs and nursing note reviewed. Constitutional:       General: She is not in acute distress. Appearance: She is well-developed. HENT:      Head: Normocephalic and atraumatic. Eyes:      General:         Right eye: Discharge present. No foreign body. Extraocular Movements: Extraocular movements intact. Conjunctiva/sclera:      Right eye: Right conjunctiva is injected. Pupils: Pupils are equal, round, and reactive to light. Neck:      Musculoskeletal: Normal range of motion. Cardiovascular:      Rate and Rhythm: Normal rate and regular rhythm. Pulmonary:      Effort: Pulmonary effort is normal. No respiratory distress. Breath sounds: Normal breath sounds. No stridor. Abdominal:      General: There is no distension. Musculoskeletal: Normal range of motion. Skin:     General: Skin is warm and dry. Neurological:      Mental Status: She is alert and oriented to person, place, and time. Diagnostic Study Results   Labs -   No results found for this or any previous visit (from the past 12 hour(s)). Radiologic Studies -   No orders to display     No results found. Medical Decision Making   I am the first provider for this patient. I reviewed the vital signs, available nursing notes, past medical history, past surgical history, family history and social history.     Vital Signs-Reviewed the patient's vital signs. Patient Vitals for the past 12 hrs:   Temp Pulse Resp BP SpO2   09/05/20 2316 98 °F (36.7 °C) 77 18 145/77 99 %   09/05/20 2100 98.4 °F (36.9 °C) 71 17 131/75 100 %       Pulse Oximetry Analysis - 99% on ra      Records Reviewed: Nursing Notes and Old Medical Records    Provider Notes (Medical Decision Making):   Ddx: corneal abrasion, conjunctivitis, foreign body    ED Course:   Initial assessment performed. The patients presenting problems have been discussed, and they are in agreement with the care plan formulated and outlined with them. I have encouraged them to ask questions as they arise throughout their visit. Procedure Note - Wood's lamp exam:  Performed by: Adalgisa Vidal MD  Pts R eye was anesthetized with tetracaine, stained with fluorescein, and examined with a Wood's lamp, using lid eversion. Foreign body: no  Fluorescein uptake: yes, showing corneal abrasion  The procedure took 1-15 minutes, and pt tolerated well. Procedures:  Procedures    Critical Care:  none    Disposition:  Discharge Note:  11:03 PM  The patient has been re-evaluated and is ready for discharge. Reviewed available results with patient. Counseled patient on diagnosis and care plan. Patient has expressed understanding, and all questions have been answered. Patient agrees with plan and agrees to follow up as recommended, or to return to the ED if their symptoms worsen. Discharge instructions have been provided and explained to the patient, along with reasons to return to the ED. PLAN:  1. Discharge Medication List as of 9/5/2020 11:03 PM      START taking these medications    Details   erythromycin (ILOTYCIN) ophthalmic ointment Apply 1/2 inch to the right eye four (4) times daily for 5 days, Normal, Disp-1 Tube,R-0         CONTINUE these medications which have NOT CHANGED    Details   naproxen (NAPROSYN) 500 mg tablet Take 1 Tab by mouth two (2) times daily (with meals). , Print, Disp-20 Tab, R-0 topiramate (TOPAMAX) 50 mg tablet Historical Med      phentermine 37.5 mg capsule take 1 capsule by mouth every morning, Historical Med      pramipexole (MIRAPEX) 0.25 mg tablet Historical Med      lisinopril (PRINIVIL, ZESTRIL) 10 mg tablet Historical Med      VICTOZA 3-MARILIN 0.6 mg/0.1 mL (18 mg/3 mL) pnij Historical Med, [place]      BREO ELLIPTA 200-25 mcg/dose inhaler Historical Med, [place]      ADVAIR DISKUS 250-50 mcg/dose diskus inhaler Historical Med, [place]      buPROPion XL (WELLBUTRIN XL) 150 mg tablet Historical Med      cyclobenzaprine (FLEXERIL) 10 mg tablet Take 1 Tab by mouth three (3) times daily as needed for Muscle Spasm(s). , Normal, Disp-15 Tab, R-0      mirabegron ER (MYRBETRIQ) 25 mg ER tablet Take 25 mg by mouth daily. , Historical Med      metoprolol succinate (TOPROL-XL) 25 mg XL tablet Take 1 Tab by mouth daily. , Normal, Disp-30 Tab, R-3      atorvastatin (LIPITOR) 20 mg tablet  20 mg = 1 tab each dose, PO, bedtime, 0 Refills, Historical Med      metFORMIN ER (GLUCOPHAGE XR) 500 mg tablet Historical Med, R-0      omeprazole (PRILOSEC) 40 mg capsule Take 1 capsule by mouth two (2) times a day., Print, Disp-60 capsule, R-2      albuterol (PROVENTIL HFA, VENTOLIN HFA) 90 mcg/actuation inhaler Take 2 Puffs by inhalation as needed., Historical Med      ARIPiprazole (ABILIFY) 10 mg tablet Take 10 mg by mouth daily. , Historical Med           2. Follow-up Information     Follow up With Specialties Details Why Contact Info    Hal Camarillo MD [facility] Medicine Schedule an appointment as soon as possible for a visit  [facility]  [phone]      your eye doctor  Schedule an appointment as soon as possible for a visit in 2 days      [facility] EMERGENCY DEPT Emergency Medicine  As needed, If symptoms worsen [address] [place] [address] Drive  [address] [facility]  [phone]        Return to ED if worse     Diagnosis     Clinical Impression:   1.  Abrasion of right cornea, initial encounter        This note will not be viewable in [address] Please note that this dictation was completed with [email or web address], the computer voice recognition software. Quite often unanticipated grammatical, syntax, homophones, and other interpretive errors are inadvertently transcribed by the computer software. Please disregard these errors.   Please excuse any errors that have escaped final proofreading

## 2020-09-06 NOTE — ED NOTES
Pt arrives to the ED with c/o right eye pain since last night after sitting in the chair watching tv, pt reports her eye started burning without injury or trauma    Pt noted to have crust around right eye, with redness

## 2021-01-24 ENCOUNTER — APPOINTMENT (OUTPATIENT)
Dept: GENERAL RADIOLOGY | Age: 56
DRG: 177 | End: 2021-01-24
Attending: EMERGENCY MEDICINE
Payer: MEDICARE

## 2021-01-24 ENCOUNTER — HOSPITAL ENCOUNTER (INPATIENT)
Age: 56
LOS: 4 days | Discharge: HOME | DRG: 177 | End: 2021-01-28
Attending: EMERGENCY MEDICINE | Admitting: HOSPITALIST
Payer: MEDICARE

## 2021-01-24 DIAGNOSIS — T78.2XXA ANAPHYLAXIS, INITIAL ENCOUNTER: ICD-10-CM

## 2021-01-24 DIAGNOSIS — J12.82 PNEUMONIA DUE TO COVID-19 VIRUS: ICD-10-CM

## 2021-01-24 DIAGNOSIS — J96.01 ACUTE HYPOXEMIC RESPIRATORY FAILURE (HCC): Primary | ICD-10-CM

## 2021-01-24 DIAGNOSIS — U07.1 PNEUMONIA DUE TO COVID-19 VIRUS: ICD-10-CM

## 2021-01-24 DIAGNOSIS — R13.10 DYSPHAGIA, UNSPECIFIED TYPE: ICD-10-CM

## 2021-01-24 PROBLEM — R06.82 TACHYPNEA: Status: ACTIVE | Noted: 2021-01-24

## 2021-01-24 PROBLEM — E87.6 HYPOKALEMIA: Status: ACTIVE | Noted: 2021-01-24

## 2021-01-24 LAB
ALBUMIN SERPL-MCNC: 2.9 G/DL (ref 3.5–5)
ALBUMIN/GLOB SERPL: 0.6 {RATIO} (ref 1.1–2.2)
ALP SERPL-CCNC: 113 U/L (ref 45–117)
ALT SERPL-CCNC: 52 U/L (ref 12–78)
ANION GAP SERPL CALC-SCNC: 8 MMOL/L (ref 5–15)
APTT PPP: 27.8 SEC (ref 22.1–31)
AST SERPL-CCNC: 78 U/L (ref 15–37)
ATRIAL RATE: 112 BPM
BASOPHILS # BLD: 0 K/UL (ref 0–0.1)
BASOPHILS NFR BLD: 0 % (ref 0–1)
BILIRUB SERPL-MCNC: 0.8 MG/DL (ref 0.2–1)
BNP SERPL-MCNC: 14 PG/ML
BUN SERPL-MCNC: 9 MG/DL (ref 6–20)
BUN/CREAT SERPL: 9 (ref 12–20)
CALCIUM SERPL-MCNC: 8 MG/DL (ref 8.5–10.1)
CALCULATED P AXIS, ECG09: 13 DEGREES
CALCULATED R AXIS, ECG10: 47 DEGREES
CALCULATED T AXIS, ECG11: 45 DEGREES
CHLORIDE SERPL-SCNC: 98 MMOL/L (ref 97–108)
CO2 SERPL-SCNC: 26 MMOL/L (ref 21–32)
COMMENT, HOLDF: NORMAL
CREAT SERPL-MCNC: 1 MG/DL (ref 0.55–1.02)
DIAGNOSIS, 93000: NORMAL
DIFFERENTIAL METHOD BLD: ABNORMAL
EOSINOPHIL # BLD: 0 K/UL (ref 0–0.4)
EOSINOPHIL NFR BLD: 0 % (ref 0–7)
ERYTHROCYTE [DISTWIDTH] IN BLOOD BY AUTOMATED COUNT: 22.4 % (ref 11.5–14.5)
EST. AVERAGE GLUCOSE BLD GHB EST-MCNC: 206 MG/DL
FERRITIN SERPL-MCNC: 149 NG/ML (ref 26–388)
FIBRINOGEN PPP-MCNC: 611 MG/DL (ref 200–475)
GLOBULIN SER CALC-MCNC: 4.6 G/DL (ref 2–4)
GLUCOSE BLD STRIP.AUTO-MCNC: 285 MG/DL (ref 65–100)
GLUCOSE BLD STRIP.AUTO-MCNC: 317 MG/DL (ref 65–100)
GLUCOSE BLD STRIP.AUTO-MCNC: 321 MG/DL (ref 65–100)
GLUCOSE BLD STRIP.AUTO-MCNC: 339 MG/DL (ref 65–100)
GLUCOSE SERPL-MCNC: 217 MG/DL (ref 65–100)
HBA1C MFR BLD: 8.8 % (ref 4–5.6)
HCT VFR BLD AUTO: 34.4 % (ref 35–47)
HGB BLD-MCNC: 10.5 G/DL (ref 11.5–16)
IMM GRANULOCYTES # BLD AUTO: 0 K/UL (ref 0–0.04)
IMM GRANULOCYTES NFR BLD AUTO: 0 % (ref 0–0.5)
IRON SATN MFR SERPL: 9 % (ref 20–50)
IRON SERPL-MCNC: 26 UG/DL (ref 35–150)
LDH SERPL L TO P-CCNC: 757 U/L (ref 81–246)
LYMPHOCYTES # BLD: 1.4 K/UL (ref 0.8–3.5)
LYMPHOCYTES NFR BLD: 25 % (ref 12–49)
MAGNESIUM SERPL-MCNC: 1.9 MG/DL (ref 1.6–2.4)
MCH RBC QN AUTO: 20.8 PG (ref 26–34)
MCHC RBC AUTO-ENTMCNC: 30.5 G/DL (ref 30–36.5)
MCV RBC AUTO: 68.3 FL (ref 80–99)
MONOCYTES # BLD: 0.3 K/UL (ref 0–1)
MONOCYTES NFR BLD: 5 % (ref 5–13)
NEUTS SEG # BLD: 4 K/UL (ref 1.8–8)
NEUTS SEG NFR BLD: 70 % (ref 32–75)
NRBC # BLD: 0 K/UL (ref 0–0.01)
NRBC BLD-RTO: 0 PER 100 WBC
P-R INTERVAL, ECG05: 152 MS
PLATELET # BLD AUTO: 210 K/UL (ref 150–400)
PMV BLD AUTO: 10.4 FL (ref 8.9–12.9)
POTASSIUM SERPL-SCNC: 3.4 MMOL/L (ref 3.5–5.1)
PROT SERPL-MCNC: 7.5 G/DL (ref 6.4–8.2)
Q-T INTERVAL, ECG07: 338 MS
QRS DURATION, ECG06: 90 MS
QTC CALCULATION (BEZET), ECG08: 461 MS
RBC # BLD AUTO: 5.04 M/UL (ref 3.8–5.2)
RBC MORPH BLD: ABNORMAL
SAMPLES BEING HELD,HOLD: NORMAL
SERVICE CMNT-IMP: ABNORMAL
SODIUM SERPL-SCNC: 132 MMOL/L (ref 136–145)
THERAPEUTIC RANGE,PTTT: NORMAL SECS (ref 58–77)
TIBC SERPL-MCNC: 302 UG/DL (ref 250–450)
TROPONIN I SERPL-MCNC: <0.05 NG/ML
VENTRICULAR RATE, ECG03: 112 BPM
WBC # BLD AUTO: 5.7 K/UL (ref 3.6–11)

## 2021-01-24 PROCEDURE — 74011000250 HC RX REV CODE- 250: Performed by: INTERNAL MEDICINE

## 2021-01-24 PROCEDURE — 83735 ASSAY OF MAGNESIUM: CPT

## 2021-01-24 PROCEDURE — XW033E5 INTRODUCTION OF REMDESIVIR ANTI-INFECTIVE INTO PERIPHERAL VEIN, PERCUTANEOUS APPROACH, NEW TECHNOLOGY GROUP 5: ICD-10-PCS | Performed by: STUDENT IN AN ORGANIZED HEALTH CARE EDUCATION/TRAINING PROGRAM

## 2021-01-24 PROCEDURE — 94640 AIRWAY INHALATION TREATMENT: CPT

## 2021-01-24 PROCEDURE — 74011000250 HC RX REV CODE- 250: Performed by: HOSPITALIST

## 2021-01-24 PROCEDURE — 83036 HEMOGLOBIN GLYCOSYLATED A1C: CPT

## 2021-01-24 PROCEDURE — 99284 EMERGENCY DEPT VISIT MOD MDM: CPT

## 2021-01-24 PROCEDURE — 74011000258 HC RX REV CODE- 258: Performed by: HOSPITALIST

## 2021-01-24 PROCEDURE — 65270000029 HC RM PRIVATE

## 2021-01-24 PROCEDURE — 71045 X-RAY EXAM CHEST 1 VIEW: CPT

## 2021-01-24 PROCEDURE — 85730 THROMBOPLASTIN TIME PARTIAL: CPT

## 2021-01-24 PROCEDURE — 83880 ASSAY OF NATRIURETIC PEPTIDE: CPT

## 2021-01-24 PROCEDURE — 74011250636 HC RX REV CODE- 250/636: Performed by: HOSPITALIST

## 2021-01-24 PROCEDURE — 96375 TX/PRO/DX INJ NEW DRUG ADDON: CPT

## 2021-01-24 PROCEDURE — 87449 NOS EACH ORGANISM AG IA: CPT

## 2021-01-24 PROCEDURE — 74011250636 HC RX REV CODE- 250/636: Performed by: EMERGENCY MEDICINE

## 2021-01-24 PROCEDURE — 93005 ELECTROCARDIOGRAM TRACING: CPT

## 2021-01-24 PROCEDURE — 74011636637 HC RX REV CODE- 636/637: Performed by: HOSPITALIST

## 2021-01-24 PROCEDURE — 74011000258 HC RX REV CODE- 258: Performed by: INTERNAL MEDICINE

## 2021-01-24 PROCEDURE — 85384 FIBRINOGEN ACTIVITY: CPT

## 2021-01-24 PROCEDURE — 83615 LACTATE (LD) (LDH) ENZYME: CPT

## 2021-01-24 PROCEDURE — 74011250636 HC RX REV CODE- 250/636: Performed by: NURSE PRACTITIONER

## 2021-01-24 PROCEDURE — 36415 COLL VENOUS BLD VENIPUNCTURE: CPT

## 2021-01-24 PROCEDURE — 84484 ASSAY OF TROPONIN QUANT: CPT

## 2021-01-24 PROCEDURE — 74011250637 HC RX REV CODE- 250/637: Performed by: INTERNAL MEDICINE

## 2021-01-24 PROCEDURE — C9113 INJ PANTOPRAZOLE SODIUM, VIA: HCPCS | Performed by: HOSPITALIST

## 2021-01-24 PROCEDURE — 96374 THER/PROPH/DIAG INJ IV PUSH: CPT

## 2021-01-24 PROCEDURE — 82962 GLUCOSE BLOOD TEST: CPT

## 2021-01-24 PROCEDURE — 85025 COMPLETE CBC W/AUTO DIFF WBC: CPT

## 2021-01-24 PROCEDURE — 87899 AGENT NOS ASSAY W/OPTIC: CPT

## 2021-01-24 PROCEDURE — 83540 ASSAY OF IRON: CPT

## 2021-01-24 PROCEDURE — 74011250637 HC RX REV CODE- 250/637: Performed by: HOSPITALIST

## 2021-01-24 PROCEDURE — 82728 ASSAY OF FERRITIN: CPT

## 2021-01-24 PROCEDURE — 74011250637 HC RX REV CODE- 250/637: Performed by: NURSE PRACTITIONER

## 2021-01-24 PROCEDURE — 80053 COMPREHEN METABOLIC PANEL: CPT

## 2021-01-24 PROCEDURE — 74011250637 HC RX REV CODE- 250/637: Performed by: EMERGENCY MEDICINE

## 2021-01-24 RX ORDER — SODIUM CHLORIDE 0.9 % (FLUSH) 0.9 %
5-40 SYRINGE (ML) INJECTION AS NEEDED
Status: DISCONTINUED | OUTPATIENT
Start: 2021-01-24 | End: 2021-01-29 | Stop reason: HOSPADM

## 2021-01-24 RX ORDER — PRAMIPEXOLE DIHYDROCHLORIDE 0.25 MG/1
0.25 TABLET ORAL
Status: DISCONTINUED | OUTPATIENT
Start: 2021-01-24 | End: 2021-01-24

## 2021-01-24 RX ORDER — INSULIN LISPRO 100 [IU]/ML
INJECTION, SOLUTION INTRAVENOUS; SUBCUTANEOUS
Status: DISCONTINUED | OUTPATIENT
Start: 2021-01-24 | End: 2021-01-29 | Stop reason: HOSPADM

## 2021-01-24 RX ORDER — METOPROLOL SUCCINATE 25 MG/1
25 TABLET, EXTENDED RELEASE ORAL DAILY
Status: DISCONTINUED | OUTPATIENT
Start: 2021-01-24 | End: 2021-01-29 | Stop reason: HOSPADM

## 2021-01-24 RX ORDER — MAGNESIUM SULFATE 100 %
4 CRYSTALS MISCELLANEOUS AS NEEDED
Status: DISCONTINUED | OUTPATIENT
Start: 2021-01-24 | End: 2021-01-29 | Stop reason: HOSPADM

## 2021-01-24 RX ORDER — ALBUTEROL SULFATE 90 UG/1
2 AEROSOL, METERED RESPIRATORY (INHALATION)
Status: DISCONTINUED | OUTPATIENT
Start: 2021-01-24 | End: 2021-01-29 | Stop reason: HOSPADM

## 2021-01-24 RX ORDER — BUPROPION HYDROCHLORIDE 150 MG/1
150 TABLET ORAL
Status: DISCONTINUED | OUTPATIENT
Start: 2021-01-24 | End: 2021-01-29 | Stop reason: HOSPADM

## 2021-01-24 RX ORDER — SODIUM CHLORIDE 0.9 % (FLUSH) 0.9 %
5-40 SYRINGE (ML) INJECTION EVERY 8 HOURS
Status: DISCONTINUED | OUTPATIENT
Start: 2021-01-24 | End: 2021-01-29 | Stop reason: HOSPADM

## 2021-01-24 RX ORDER — ACETAMINOPHEN 650 MG/1
650 SUPPOSITORY RECTAL
Status: DISCONTINUED | OUTPATIENT
Start: 2021-01-24 | End: 2021-01-29 | Stop reason: HOSPADM

## 2021-01-24 RX ORDER — ALBUTEROL SULFATE 90 UG/1
6 AEROSOL, METERED RESPIRATORY (INHALATION)
Status: COMPLETED | OUTPATIENT
Start: 2021-01-24 | End: 2021-01-24

## 2021-01-24 RX ORDER — ASCORBIC ACID 500 MG
500 TABLET ORAL DAILY
Status: DISCONTINUED | OUTPATIENT
Start: 2021-01-24 | End: 2021-01-29 | Stop reason: HOSPADM

## 2021-01-24 RX ORDER — DEXAMETHASONE 4 MG/1
6 TABLET ORAL DAILY
Status: DISCONTINUED | OUTPATIENT
Start: 2021-01-25 | End: 2021-01-29 | Stop reason: HOSPADM

## 2021-01-24 RX ORDER — TOPIRAMATE 25 MG/1
50 TABLET ORAL DAILY
Status: DISCONTINUED | OUTPATIENT
Start: 2021-01-24 | End: 2021-01-29 | Stop reason: HOSPADM

## 2021-01-24 RX ORDER — TROSPIUM CHLORIDE 20 MG/1
20 TABLET, FILM COATED ORAL
Status: DISCONTINUED | OUTPATIENT
Start: 2021-01-24 | End: 2021-01-29 | Stop reason: HOSPADM

## 2021-01-24 RX ORDER — GUAIFENESIN/DEXTROMETHORPHAN 100-10MG/5
5 SYRUP ORAL
Status: DISCONTINUED | OUTPATIENT
Start: 2021-01-24 | End: 2021-01-26

## 2021-01-24 RX ORDER — ARIPIPRAZOLE 5 MG/1
10 TABLET ORAL DAILY
Status: DISCONTINUED | OUTPATIENT
Start: 2021-01-24 | End: 2021-01-26

## 2021-01-24 RX ORDER — ATORVASTATIN CALCIUM 20 MG/1
20 TABLET, FILM COATED ORAL
Status: DISCONTINUED | OUTPATIENT
Start: 2021-01-24 | End: 2021-01-29 | Stop reason: HOSPADM

## 2021-01-24 RX ORDER — POTASSIUM CHLORIDE 7.45 MG/ML
10 INJECTION INTRAVENOUS ONCE
Status: DISCONTINUED | OUTPATIENT
Start: 2021-01-24 | End: 2021-01-24 | Stop reason: ALTCHOICE

## 2021-01-24 RX ORDER — ARFORMOTEROL TARTRATE 15 UG/2ML
15 SOLUTION RESPIRATORY (INHALATION)
Status: DISCONTINUED | OUTPATIENT
Start: 2021-01-24 | End: 2021-01-24 | Stop reason: ALTCHOICE

## 2021-01-24 RX ORDER — BUDESONIDE 0.5 MG/2ML
1000 INHALANT ORAL 2 TIMES DAILY
Status: DISCONTINUED | OUTPATIENT
Start: 2021-01-24 | End: 2021-01-24 | Stop reason: ALTCHOICE

## 2021-01-24 RX ORDER — POLYETHYLENE GLYCOL 3350 17 G/17G
17 POWDER, FOR SOLUTION ORAL DAILY PRN
Status: DISCONTINUED | OUTPATIENT
Start: 2021-01-24 | End: 2021-01-29 | Stop reason: HOSPADM

## 2021-01-24 RX ORDER — ONDANSETRON 2 MG/ML
4 INJECTION INTRAMUSCULAR; INTRAVENOUS
Status: DISCONTINUED | OUTPATIENT
Start: 2021-01-24 | End: 2021-01-29 | Stop reason: HOSPADM

## 2021-01-24 RX ORDER — ACETAMINOPHEN 325 MG/1
650 TABLET ORAL
Status: DISCONTINUED | OUTPATIENT
Start: 2021-01-24 | End: 2021-01-29 | Stop reason: HOSPADM

## 2021-01-24 RX ORDER — DEXTROSE 50 % IN WATER (D50W) INTRAVENOUS SYRINGE
12.5-25 AS NEEDED
Status: DISCONTINUED | OUTPATIENT
Start: 2021-01-24 | End: 2021-01-29 | Stop reason: HOSPADM

## 2021-01-24 RX ORDER — METFORMIN HYDROCHLORIDE 500 MG/1
1000 TABLET ORAL 2 TIMES DAILY WITH MEALS
Status: DISCONTINUED | OUTPATIENT
Start: 2021-01-24 | End: 2021-01-29 | Stop reason: HOSPADM

## 2021-01-24 RX ORDER — DEXAMETHASONE 4 MG/1
6 TABLET ORAL EVERY 12 HOURS
Status: DISCONTINUED | OUTPATIENT
Start: 2021-01-24 | End: 2021-01-24

## 2021-01-24 RX ORDER — ENOXAPARIN SODIUM 100 MG/ML
30 INJECTION SUBCUTANEOUS EVERY 12 HOURS
Status: DISCONTINUED | OUTPATIENT
Start: 2021-01-24 | End: 2021-01-27

## 2021-01-24 RX ORDER — POTASSIUM CHLORIDE 20 MEQ/1
20 TABLET, EXTENDED RELEASE ORAL
Status: COMPLETED | OUTPATIENT
Start: 2021-01-24 | End: 2021-01-24

## 2021-01-24 RX ORDER — PROMETHAZINE HYDROCHLORIDE 25 MG/1
12.5 TABLET ORAL
Status: DISCONTINUED | OUTPATIENT
Start: 2021-01-24 | End: 2021-01-29 | Stop reason: HOSPADM

## 2021-01-24 RX ORDER — BUDESONIDE AND FORMOTEROL FUMARATE DIHYDRATE 160; 4.5 UG/1; UG/1
2 AEROSOL RESPIRATORY (INHALATION)
Status: DISCONTINUED | OUTPATIENT
Start: 2021-01-24 | End: 2021-01-29 | Stop reason: HOSPADM

## 2021-01-24 RX ORDER — ZINC SULFATE 50(220)MG
1 CAPSULE ORAL DAILY
Status: DISCONTINUED | OUTPATIENT
Start: 2021-01-24 | End: 2021-01-29 | Stop reason: HOSPADM

## 2021-01-24 RX ADMIN — METOPROLOL SUCCINATE 25 MG: 50 TABLET, EXTENDED RELEASE ORAL at 10:08

## 2021-01-24 RX ADMIN — METFORMIN HYDROCHLORIDE 1000 MG: 500 TABLET ORAL at 17:16

## 2021-01-24 RX ADMIN — AZITHROMYCIN MONOHYDRATE 500 MG: 500 INJECTION, POWDER, LYOPHILIZED, FOR SOLUTION INTRAVENOUS at 06:01

## 2021-01-24 RX ADMIN — SODIUM CHLORIDE 40 MG: 9 INJECTION, SOLUTION INTRAMUSCULAR; INTRAVENOUS; SUBCUTANEOUS at 10:06

## 2021-01-24 RX ADMIN — BUPROPION HYDROCHLORIDE 150 MG: 150 TABLET, EXTENDED RELEASE ORAL at 10:08

## 2021-01-24 RX ADMIN — INSULIN LISPRO 7 UNITS: 100 INJECTION, SOLUTION INTRAVENOUS; SUBCUTANEOUS at 17:15

## 2021-01-24 RX ADMIN — ACETAMINOPHEN 650 MG: 325 TABLET ORAL at 10:04

## 2021-01-24 RX ADMIN — DEXAMETHASONE 6 MG: 4 TABLET ORAL at 10:05

## 2021-01-24 RX ADMIN — INSULIN LISPRO 7 UNITS: 100 INJECTION, SOLUTION INTRAVENOUS; SUBCUTANEOUS at 12:56

## 2021-01-24 RX ADMIN — ATORVASTATIN CALCIUM 20 MG: 20 TABLET, FILM COATED ORAL at 06:00

## 2021-01-24 RX ADMIN — Medication 10 ML: at 21:49

## 2021-01-24 RX ADMIN — Medication 1 CAPSULE: at 10:05

## 2021-01-24 RX ADMIN — CEFTRIAXONE 1 G: 1 INJECTION, POWDER, FOR SOLUTION INTRAMUSCULAR; INTRAVENOUS at 05:40

## 2021-01-24 RX ADMIN — TOPIRAMATE 50 MG: 25 TABLET, FILM COATED ORAL at 10:08

## 2021-01-24 RX ADMIN — METHYLPREDNISOLONE SODIUM SUCCINATE 125 MG: 125 INJECTION, POWDER, FOR SOLUTION INTRAMUSCULAR; INTRAVENOUS at 05:02

## 2021-01-24 RX ADMIN — INSULIN LISPRO 7 UNITS: 100 INJECTION, SOLUTION INTRAVENOUS; SUBCUTANEOUS at 10:02

## 2021-01-24 RX ADMIN — POTASSIUM CHLORIDE 20 MEQ: 20 TABLET, EXTENDED RELEASE ORAL at 10:05

## 2021-01-24 RX ADMIN — Medication 10 ML: at 10:01

## 2021-01-24 RX ADMIN — ARIPIPRAZOLE 10 MG: 5 TABLET ORAL at 10:09

## 2021-01-24 RX ADMIN — ENOXAPARIN SODIUM 30 MG: 30 INJECTION SUBCUTANEOUS at 06:00

## 2021-01-24 RX ADMIN — ALBUTEROL SULFATE 2 PUFF: 90 AEROSOL, METERED RESPIRATORY (INHALATION) at 11:40

## 2021-01-24 RX ADMIN — Medication 10 ML: at 16:48

## 2021-01-24 RX ADMIN — ALBUTEROL SULFATE 6 PUFF: 90 AEROSOL, METERED RESPIRATORY (INHALATION) at 05:41

## 2021-01-24 RX ADMIN — ALBUTEROL SULFATE 2 PUFF: 90 AEROSOL, METERED RESPIRATORY (INHALATION) at 16:14

## 2021-01-24 RX ADMIN — ATORVASTATIN CALCIUM 20 MG: 20 TABLET, FILM COATED ORAL at 21:46

## 2021-01-24 RX ADMIN — ALBUTEROL SULFATE 2 PUFF: 90 AEROSOL, METERED RESPIRATORY (INHALATION) at 10:02

## 2021-01-24 RX ADMIN — TROSPIUM CHLORIDE 20 MG: 20 TABLET, FILM COATED ORAL at 19:49

## 2021-01-24 RX ADMIN — OXYCODONE HYDROCHLORIDE AND ACETAMINOPHEN 500 MG: 500 TABLET ORAL at 10:05

## 2021-01-24 RX ADMIN — ENOXAPARIN SODIUM 30 MG: 30 INJECTION SUBCUTANEOUS at 17:15

## 2021-01-24 RX ADMIN — ALBUTEROL SULFATE 2 PUFF: 90 AEROSOL, METERED RESPIRATORY (INHALATION) at 21:40

## 2021-01-24 RX ADMIN — REMDESIVIR 200 MG: 100 INJECTION, POWDER, LYOPHILIZED, FOR SOLUTION INTRAVENOUS at 16:18

## 2021-01-24 RX ADMIN — INSULIN LISPRO 5 UNITS: 100 INJECTION, SOLUTION INTRAVENOUS; SUBCUTANEOUS at 21:46

## 2021-01-24 RX ADMIN — Medication 10 ML: at 16:18

## 2021-01-24 NOTE — ED NOTES
ED visit d/t SOB / difficulty swallowing / worsening coughing, non productive / lip swelling    Hx of COPD / smoker (+) tobacco - recently Covid (+) 18th of January 2021     Denies use of oxygen use at home     Onset of worsening sxs at 0000 1/24/21 leading to EMS call - on arrival EMS noted concern over lips swelling alongside with other chief complaints - due d/t lip swelling and dysphagia - EMS medicated with EPI IM / Benadryl IV - on arrival, pt with wheezing at bases / coarse lung sounds all throughout / tachypnea / increased work of breathing    0444 - Portable Chest XR at beside for imaging    0500 - Travis Sanchez MD at bedside for eval - pt reports having improve lip swelling and dysphagia     0520 - Travis Sanchez MD will order medications - MD is deferring from 2 sets of blood cultures at this time     1706 - Alma RAYMOND at bedside for admission eval     0720 - Bedside shift change report given to [address] [place] [address] (oncoming nurse) by Prakash Carrillo RN (offgoing nurse). Report included the following information SBAR, Kardex, ED Summary, Intake/Output and MAR.

## 2021-01-24 NOTE — ED NOTES
1130: Pt resting in position of comfort. Will continue to monitor    1245: Pt up to bedside commode. Urine output documented.      1318: Dietary states meal tray will be sent soon

## 2021-01-24 NOTE — H&P
Hospitalist Admission Note    NAME: Chaitanya Sellers   :  1965   MRN:  [id number]     Date/Time:  2021 5:44 AM    Patient PCP: Lucia Hobbs MD  _____________________________________________________________________  Given the patient's current clinical presentation, I have a high level of concern for decompensation if discharged from the emergency department. Complex decision making was performed, which includes reviewing the patient's available past medical records, laboratory results, and x-ray films. My assessment of this patient's clinical condition and my plan of care is as follows. Assessment / Plan:    Covid pneumonia positive on    CXR Bilateral lower lobe airspace opacification. COPD Exacerbation  Tachypnea Upto 40s/Increase Work of breathing  Sat. 98% on RA  Tobacco abuse Per pt. She quiet since diagnosed with covid  Dysphagia  Will monitor and if not improved will  Need SLP/GI/esophagogram xray   Angioedema? Lip swelling ? As per ems rxed  withe epi/benadryl ( Per pt. She had lip swelling which is resolved now)-No swelling of lips now  Pt. is on lisinopril so will Hold that for now. Hypokalemia 3.4 repleted  Monitor  Tachy  EKG Sinus tachy 112,no STEMI      -Admit to tele  -IV steroid methylpred 40mg  tid  -IV Ceftriaxone/azithro  -IS ,Proning as tolrated  Zinc/vit. c , moderate dose lovenox, symbicort/albuterol inh, oxygen as needed,check covid markers, mucinex      DM- Cont. ssi check a1c  HTN Controlled cont home meds except lisinopril/acei(discussed with pt.  Not to take  acei 2/2  Possible angioedema to lisinopril) , reason as  Stated above  Depression/HLD/GERD Cont home meds    Code Status:Full code  Surrogate Decision Maker:    DVT Prophylaxis: sq lovenox  GI Prophylaxis: not indicated    Baseline: independent       Previous GI Hx:NOTE Date of Service:  20  Niecy Mcgrath MD Physician Gastroenterology   [facility] [place] [facility] gastroenterology a[facility])  Indications:  Dysphagia/odynophagia only recent development no hx of GERD   Findings: edematous oropharynx w/o inflammation  Esophagus:normal OTG [address]  Stomach: mild patchy erythema - Pyloritec    Recommendations:  -Acid suppression with a proton pump inhibitor. , -Await NAA test result and treat for Helicobacter pylori if positive. , -No NSAIDS, -see your primary care doctor for possible pharyngitis, call office to schedule esophagogram xray of your esophagus. No definite blockage maybe throat infection             Subjective:   CHIEF COMPLAINT:  SOB/Difficulty in swallowing/lip swelling per pt. HISTORY OF PRESENT ILLNESS:     Geno Yanez is a 54 y.o. female  With h/o copd, tobacco abuse, depression  , recent covid positive  1/18 , not on home oxygen and  gastritis, who presents with acute sob since last night associatted with feeling of difficulty in swallowing  Per ems she was c/o of associatted dysphagia and lip swelling ? So they rxed her with epi im/benadryl  for possible angioedema. On arrival to ed pt. Does not have lip swelling. To me she satated that she  Still feeling difficulty in swallowing. Currently is tachypnic and increase work of breathing. We were asked to admit for work up and evaluation of the above problems. PCP: Clarisa Mendoza MD        Vital Signs-Reviewed the patient's vital signs.   Patient Vitals for the past 24 hrs:    Temp Pulse Resp BP SpO2   01/24/21 0420  (!) 112 (!) 45  95 %   01/24/21 0417 99.3 °F (37.4 °C) (!) 117 (!) 44 (!) 154/79 94 %   01/24/21 0415    (!) 154/79            Past Medical History:   Diagnosis Date    Chronic pelvic pain in female 7/9/2014    Depression     Diabetes ([facility] [address]     Hypertension     Obesity (BMI 35.0-39.9 without comorbidity)     SHAGUFTA on CPAP 3/9/2017    Posttraumatic stress disorder     Psychiatric disorder     depression    Recurrent major depression ([facility] [address] 7/27/2010    S/P ventral herniorrhaphy 2018    Suicidal thoughts     Thyroid nodule 10/1/2015    Unspecified sleep apnea     uses cpap        Past Surgical History:   Procedure Laterality Date    ABDOMEN SURGERY PROC UNLISTED      cholecystectomy    HX  SECTION      HX  SECTION      HX HEENT      head and neck surgery     HX HERNIA REPAIR  2018    incarcerated ventral hernia repair with mesh    HX HYSTERECTOMY      HX ORTHOPAEDIC      L ankle surgery     HX ORTHOPAEDIC Left     TOTAL HIP REPLACEMENT    HX TUBAL LIGATION      PPBTL       Social History     Tobacco Use    Smoking status: Current Every Day Smoker     Packs/day: 2.00     Years: 30.00     Pack years: 60.00     Types: Cigarettes     Last attempt to quit: 2019     Years since quittin.4    Smokeless tobacco: Never Used   Substance Use Topics    Alcohol use: Never     Frequency: Never     Binge frequency: Never        Family History   Problem Relation Age of Onset    Cancer Mother 61        Breast cancer    Breast Cancer Mother 48    Heart Disease Father 50        M. I.   Jovany Loser Asthma Sister     Heart Disease Brother     Hypertension Brother     Hypertension Brother     Diabetes Brother     Lung Disease Brother         COPD     Allergies   Allergen Reactions    Zantac [Ranitidine Hcl] Hives    Zantac [Ranitidine Hcl] Hives        Prior to Admission medications    Medication Sig Start Date End Date Taking? Authorizing Provider   ARIPiprazole (ABILIFY) 10 mg tablet Take 10 mg by mouth daily. Yes Other, MD Vanessa   naproxen (NAPROSYN) 500 mg tablet Take 1 Tab by mouth two (2) times daily (with meals).  6/15/20   Gabriela Ham MD   topiramate (TOPAMAX) 50 mg tablet  20   Provider, Historical   phentermine 37.5 mg capsule take 1 capsule by mouth every morning 19   Provider, Historical   pramipexole (MIRAPEX) 0.25 mg tablet  19   Provider, Historical   lisinopril (PRINIVIL, ZESTRIL) 10 mg tablet  20   Provider, Historical   VICTOZA 3-MARILIN 0.6 mg/0.1 mL (18 mg/3 mL) pnij  1/6/20   Provider, Historical   BREO ELLIPTA 200-25 mcg/dose inhaler  11/7/19   Provider, Historical   ADVAIR DISKUS 250-50 mcg/dose diskus inhaler  12/11/19   Provider, Historical   buPROPion XL (WELLBUTRIN XL) 150 mg tablet  12/31/19   Provider, Historical   cyclobenzaprine (FLEXERIL) 10 mg tablet Take 1 Tab by mouth three (3) times daily as needed for Muscle Spasm(s). 12/26/19   Reji Roberts PA-C   mirabegron ER (MYRBETRIQ) 25 mg ER tablet Take 25 mg by mouth daily. Vanessa López MD   metoprolol succinate (TOPROL-XL) 25 mg XL tablet Take 1 Tab by mouth daily. 3/9/16   Dwight Mcclellan MD   atorvastatin (LIPITOR) 20 mg tablet  20 mg = 1 tab each dose, PO, bedtime, 0 Refills 10/7/15   Provider, Historical   metFORMIN ER (GLUCOPHAGE XR) 500 mg tablet  12/19/15   Provider, Historical   omeprazole (PRILOSEC) 40 mg capsule Take 1 capsule by mouth two (2) times a day. 11/11/14   Inessa Cardenas MD   albuterol (PROVENTIL HFA, VENTOLIN HFA) 90 mcg/actuation inhaler Take 2 Puffs by inhalation as needed. Provider, Historical       REVIEW OF SYSTEMS:     I am not able to complete the review of systems because:    The patient is intubated and sedated    The patient has altered mental status due to his acute medical problems    The patient has baseline aphasia from prior stroke(s)    The patient has baseline dementia and is not reliable historian    The patient is in acute medical distress and unable to provide information           Total of 12 systems reviewed as follows:       POSITIVE= underlined text  Negative = text not underlined  General:  fever, chills, sweats, generalized weakness, weight loss/gain,      loss of appetite   Eyes:    blurred vision, eye pain, loss of vision, double vision  ENT:    rhinorrhea, pharyngitis   Respiratory:   cough, sputum production, SOB, BARNES, wheezing, pleuritic pain   Cardiology:   chest pain, palpitations, orthopnea, PND, edema, syncope   Gastrointestinal:  abdominal pain , N/V, diarrhea, dysphagia, constipation, bleeding   Genitourinary:  frequency, urgency, dysuria, hematuria, incontinence   Muskuloskeletal :  arthralgia, myalgia, back pain  Hematology:  easy bruising, nose or gum bleeding, lymphadenopathy   Dermatological: rash, ulceration, pruritis, color change / jaundice  Endocrine:   hot flashes or polydipsia   Neurological:  headache, dizziness, confusion, focal weakness, paresthesia,     Speech difficulties, memory loss, gait difficulty  Psychological: Feelings of anxiety, depression, agitation    Objective:   VITALS:    Visit Vitals  /78   Pulse (!) 108   Temp 99.8 °F (37.7 °C)   Resp (!) 37   Ht 5' (1.524 m)   Wt 99.8 kg (220 lb)   SpO2 92%   BMI 42.97 kg/m²       PHYSICAL EXAM:    General:    Alert, cooperative, mild distress, appears stated age. HEENT: Atraumatic, anicteric sclerae, pink conjunctivae     No oral ulcers, mucosa moist, throat clear, dentition fair  Neck:  Supple, symmetrical,  thyroid: non tender  Lungs: Tachypnic, Positive b/l Wheezing and Rhonchi. No rales. Chest wall:  No tenderness  No Accessory muscle use. Heart:   Regular  rhythm,  No  murmur   No edema  Abdomen:   Soft, non-tender. Not distended. Bowel sounds normal  Extremities: No cyanosis. No clubbing,      Skin turgor normal, Capillary refill normal, Radial dial pulse 2+  Skin:     Not pale. Not Jaundiced  No rashes   Psych:  Good insight. Not depressed. Not anxious or agitated. Neurologic: EOMs intact. No facial asymmetry. No aphasia or slurred speech. Symmetrical strength, Sensation grossly intact.  Alert and oriented X 4.     _______________________________________________________________________  Care Plan discussed with:    Comments   Patient y    Family      RN y    Care Manager                    Consultant:  deepti Reed md   _______________________________________________________________________  Expected  Disposition: Home with Family y   HH/PT/OT/RN    SNF/LTC    DENISE    ________________________________________________________________________  TOTAL TIME:65    Minutes    Critical Care Provided     Minutes non procedure based      Comments    y Reviewed previous records   >50% of visit spent in counseling and coordination of care y Discussion with patient and/or family and questions answered       Given the patient's current clinical presentation, I have a high level of concern for decompensation if discharged from the ED. Complex decision making was performed which includes reviewing the patient's available past medical records, laboratory results, and Xray films. I have also directly communicated my plan and discussed this case with the involved ED physician.     ____________________________________________________________________  Jacque Cruz MD    Procedures: see electronic medical records for all procedures/Xrays and details which were not copied into this note but were reviewed prior to creation of Plan.     LAB DATA REVIEWED:    Recent Results (from the past 24 hour(s))   EKG, 12 LEAD, INITIAL    Collection Time: 01/24/21  4:17 AM   Result Value Ref Range    Ventricular Rate 112 BPM    Atrial Rate 112 BPM    P-R Interval 152 ms    QRS Duration 90 ms    Q-T Interval 338 ms    QTC Calculation (Bezet) 461 ms    Calculated P Axis 13 degrees    Calculated R Axis 47 degrees    Calculated T Axis 45 degrees    Diagnosis       Sinus tachycardia  When compared with ECG of 04-JAN-2018 12:49,  ST no longer depressed in Anterior leads     CBC WITH AUTOMATED DIFF    Collection Time: 01/24/21  4:24 AM   Result Value Ref Range    WBC 5.7 3.6 - 11.0 K/uL    RBC 5.04 3.80 - 5.20 M/uL    HGB 10.5 (L) 11.5 - 16.0 g/dL    HCT 34.4 (L) 35.0 - 47.0 %    MCV 68.3 (L) 80.0 - 99.0 FL    MCH 20.8 (L) 26.0 - 34.0 PG    MCHC 30.5 30.0 - 36.5 g/dL    RDW 22.4 (H) 11.5 - 14.5 %    PLATELET 871 959 - 859 K/uL    MPV 10.4 8.9 - 12.9 FL    NRBC 0.0 0 PER 100 WBC    ABSOLUTE NRBC 0.00 0.00 - 0.01 K/uL    NEUTROPHILS 70 32 - 75 %    LYMPHOCYTES 25 12 - 49 %    MONOCYTES 5 5 - 13 %    EOSINOPHILS 0 0 - 7 %    BASOPHILS 0 0 - 1 %    IMMATURE GRANULOCYTES 0 0.0 - 0.5 %    ABS. NEUTROPHILS 4.0 1.8 - 8.0 K/UL    ABS. LYMPHOCYTES 1.4 0.8 - 3.5 K/UL    ABS. MONOCYTES 0.3 0.0 - 1.0 K/UL    ABS. EOSINOPHILS 0.0 0.0 - 0.4 K/UL    ABS. BASOPHILS 0.0 0.0 - 0.1 K/UL    ABS. IMM. GRANS. 0.0 0.00 - 0.04 K/UL    DF SMEAR SCANNED      RBC COMMENTS ANISOCYTOSIS  1+       METABOLIC PANEL, COMPREHENSIVE    Collection Time: 01/24/21  4:24 AM   Result Value Ref Range    Sodium 132 (L) 136 - 145 mmol/L    Potassium 3.4 (L) 3.5 - 5.1 mmol/L    Chloride 98 97 - 108 mmol/L    CO2 26 21 - 32 mmol/L    Anion gap 8 5 - 15 mmol/L    Glucose 217 (H) 65 - 100 mg/dL    BUN 9 6 - 20 MG/DL    Creatinine 1.00 0.55 - 1.02 MG/DL    BUN/Creatinine ratio 9 (L) 12 - 20      GFR est AA >60 >60 ml/min/1.73m2    GFR est non-AA 58 (L) >60 ml/min/1.73m2    Calcium 8.0 (L) 8.5 - 10.1 MG/DL    Bilirubin, total 0.8 0.2 - 1.0 MG/DL    ALT (SGPT) 52 12 - 78 U/L    AST (SGOT) 78 (H) 15 - 37 U/L    Alk.  phosphatase 113 45 - 117 U/L    Protein, total 7.5 6.4 - 8.2 g/dL    Albumin 2.9 (L) 3.5 - 5.0 g/dL    Globulin 4.6 (H) 2.0 - 4.0 g/dL    A-G Ratio 0.6 (L) 1.1 - 2.2     TROPONIN I    Collection Time: 01/24/21  4:24 AM   Result Value Ref Range    Troponin-I, Qt. <0.05 <0.05 ng/mL

## 2021-01-24 NOTE — ED NOTES
Pt updated on plan of care. Potassium IV infusion ordered during night shift but was missed, unable to initiate at this time as there are no IV pumps available to properly and safely administer to patient. Charge RN is aware of the pump shortage and is working on obtaining a pump. Pt resting in bed with no complaints at this time. Has eaten 50% of meal tray. Fresh ice water provided and pt texting on cell phone.

## 2021-01-24 NOTE — ED PROVIDER NOTES
EMERGENCY DEPARTMENT HISTORY AND PHYSICAL EXAM    Please note that this dictation was completed with [employer], the computer voice recognition software. Quite often unanticipated grammatical, syntax, homophones, and other interpretive errors are inadvertently transcribed by the computer software. Please disregard these errors. Please excuse any errors that have escaped final proofreading. Date: 1/24/2021  Patient Name: Clyde Cooper  Patient Age and Sex: 54 y.o. female    History of Presenting Illness     Chief Complaint   Patient presents with    Lip Swelling     EMS reports pt with lip swelling, was given 50mg Benadryl and 0.3mg epi PTA by EMS       History Provided By: Patient    HPI: Clyde Cooper, is a 54 y.o. female whose medical history is noted below and includes dysphagia, gastritis, COPD, smoked until her current dx of covid which was on the 18th, presents to the ED with increased dyspnea. She is not oxygen dependent at baseline but does have SHAGUFTA and wears CPAP at night. She called 911 because she developed worsening dyspnea this evening. In the ambulance, patient's lips appeared to be swollen and she reported difficulty with swallowing. No rashes, no vomiting, diarrhea or sob. EMS was concerned about possibility of anaphylaxis and administered benadryl 50, IM epi. Arrives in the Ed appearing to be in respiratory distress. Currently no lip or facial swelling appreciated. Still has dyspnea but this has improved with supplemental o2, currently on 4LNC. Pt denies any other alleviating or exacerbating factors. No other associated signs or symptoms. There are no other complaints, changes or physical findings at this time.      PCP: Ozzie Isaac MD    Past History   All documented elements of the [address] [employer]e reviewed and verified by me. -Elsa Moraes MD    Past Medical History:  Past Medical History:   Diagnosis Date    Chronic pelvic pain in female 7/9/2014    Depression     Diabetes ([facility]     Hypertension     Obesity (BMI 35.0-39.9 without comorbidity)     SHAGUFTA on CPAP 3/9/2017    Posttraumatic stress disorder     Psychiatric disorder     depression    Recurrent major depression ([facility] 2010    S/P ventral herniorrhaphy 2018    Suicidal thoughts     Thyroid nodule 10/1/2015    Unspecified sleep apnea     uses cpap       Past Surgical History:  Past Surgical History:   Procedure Laterality Date    ABDOMEN SURGERY PROC UNLISTED      cholecystectomy    HX  SECTION      HX  SECTION      HX HEENT      head and neck surgery     HX HERNIA REPAIR  2018    incarcerated ventral hernia repair with mesh    HX HYSTERECTOMY      HX ORTHOPAEDIC      L ankle surgery     HX ORTHOPAEDIC Left     TOTAL HIP REPLACEMENT    HX TUBAL LIGATION      PPBTL       Family History:  Family History   Problem Relation Age of Onset    Cancer Mother 61        Breast cancer   Alexi Breast Cancer Mother 48    Heart Disease Father 50        M. I.   Alexi Asthma Sister     Heart Disease Brother     Hypertension Brother     Hypertension Brother     Diabetes Brother     Lung Disease Brother         COPD       Social History:  Social History     Tobacco Use    Smoking status: Current Every Day Smoker     Packs/day: 2.00     Years: 30.00     Pack years: 60.00     Types: Cigarettes     Last attempt to quit: 2019     Years since quittin.4    Smokeless tobacco: Never Used   Substance Use Topics    Alcohol use: Never     Frequency: Never     Binge frequency: Never    Drug use: Never       Allergies: Allergies   Allergen Reactions    Zantac [Ranitidine Hcl] Hives    Zantac [Ranitidine Hcl] Hives       Review of Systems   All other systems reviewed and negative    Review of Systems   Constitutional: Negative for appetite change and fever. HENT: Positive for trouble swallowing (chronic). Negative for congestion, facial swelling and sore throat. Eyes: Negative. Negative for photophobia, discharge and itching. Respiratory: Positive for cough and shortness of breath. Negative for choking, wheezing and stridor. Cardiovascular: Positive for palpitations. Negative for chest pain. Gastrointestinal: Negative for abdominal pain, diarrhea, nausea and vomiting. Endocrine: Negative. Genitourinary: Negative for dysuria and flank pain. Musculoskeletal: Negative for back pain. Skin: Negative. Negative for rash. Neurological: Negative for dizziness and headaches. Hematological: Negative. All other systems reviewed and are negative. Physical Exam   Reviewed patients vital signs and nursing note    Physical Exam  Vitals signs and nursing note reviewed. HENT:      Head: Atraumatic. Nose: Nose normal. No congestion. Mouth/Throat:      Mouth: Mucous membranes are dry. Eyes:      General: No scleral icterus. Extraocular Movements: Extraocular movements intact. Conjunctiva/sclera: Conjunctivae normal.      Pupils: Pupils are equal, round, and reactive to light. Neck:      Musculoskeletal: Normal range of motion and neck supple. No muscular tenderness. Cardiovascular:      Rate and Rhythm: Regular rhythm. Tachycardia present. Pulses: Normal pulses. Heart sounds: Normal heart sounds. Pulmonary:      Effort: Tachypnea and accessory muscle usage present. Breath sounds: No stridor. Examination of the right-lower field reveals decreased breath sounds. Examination of the left-lower field reveals decreased breath sounds. Decreased breath sounds present. No wheezing. Abdominal:      Palpations: Abdomen is soft. Tenderness: There is no abdominal tenderness. Musculoskeletal: Normal range of motion. Lymphadenopathy:      Cervical: No cervical adenopathy. Skin:     General: Skin is warm and dry. Capillary Refill: Capillary refill takes less than 2 seconds. Neurological:      General: No focal deficit present. Mental Status: She is alert. Psychiatric:         Mood and Affect: Mood normal.         Behavior: Behavior normal.         Diagnostic Study Results     Labs - I have personally reviewed and interpreted all laboratory results. Denise Tobin MD, MSc  Recent Results (from the past 24 hour(s))   EKG, 12 LEAD, INITIAL    Collection Time: 01/24/21  4:17 AM   Result Value Ref Range    Ventricular Rate 112 BPM    Atrial Rate 112 BPM    P-R Interval 152 ms    QRS Duration 90 ms    Q-T Interval 338 ms    QTC Calculation (Bezet) 461 ms    Calculated P Axis 13 degrees    Calculated R Axis 47 degrees    Calculated T Axis 45 degrees    Diagnosis       Sinus tachycardia  When compared with ECG of 04-JAN-2018 12:49,  ST no longer depressed in Anterior leads     CBC WITH AUTOMATED DIFF    Collection Time: 01/24/21  4:24 AM   Result Value Ref Range    WBC 5.7 3.6 - 11.0 K/uL    RBC 5.04 3.80 - 5.20 M/uL    HGB 10.5 (L) 11.5 - 16.0 g/dL    HCT 34.4 (L) 35.0 - 47.0 %    MCV 68.3 (L) 80.0 - 99.0 FL    MCH 20.8 (L) 26.0 - 34.0 PG    MCHC 30.5 30.0 - 36.5 g/dL    RDW 22.4 (H) 11.5 - 14.5 %    PLATELET 423 718 - 196 K/uL    MPV 10.4 8.9 - 12.9 FL    NRBC 0.0 0  WBC    ABSOLUTE NRBC 0.00 0.00 - 0.01 K/uL    NEUTROPHILS PENDING %    LYMPHOCYTES PENDING %    MONOCYTES PENDING %    EOSINOPHILS PENDING %    BASOPHILS PENDING %    IMMATURE GRANULOCYTES PENDING %    ABS. NEUTROPHILS PENDING K/UL    ABS. LYMPHOCYTES PENDING K/UL    ABS. MONOCYTES PENDING K/UL    ABS. EOSINOPHILS PENDING K/UL    ABS. BASOPHILS PENDING K/UL    ABS. IMM. GRANS. PENDING K/UL    DF PENDING        Radiologic Studies - I have personally reviewed and interpreted all imaging studies and agree with radiology interpretation and report. - Denise Tobin MD, MSc  XR CHEST PORT    (Results Pending)         Medical Decision Making   I am the first provider for this patient.     Records Reviewed: I reviewed our electronic medical record system for any past medical records that were available that may contribute to the patient's current condition, including their PMH, surgical history, social and family history. Reviewed the nursing notes and vital signs from today's visit. Nursing notes will be reviewed as they become available in realtime while the pt has been in the ED. In addition, I read most recent discharge summaries, if available and reviewed prior ECGs or imaging studies for comparison purposes. Cassy Nelson MD Msc    Vital Signs-Reviewed the patient's vital signs. Patient Vitals for the past 24 hrs:   Temp Pulse Resp BP SpO2   01/24/21 0420  (!) 112 (!) 45  95 %   01/24/21 0417 99.3 °F (37.4 °C) (!) 117 (!) 44 (!) 154/79 94 %   01/24/21 0415    (!) 154/79        ECG interpretation: sinus rhythm with rate 112, normal intervals, no ST elevations, depressions or other changes concerning for acute ischemia. This ECG has been viewed and interpreted by me personally. Cassy Nelson MD, Msc    Provider Notes (Medical Decision Making):   Patient who is known covid positive presents to the ED with dyspnea, tachypnea, hypoxemic respiratory failure. Dyspnea began worsening this evening. Possible allergic reaction to unknown trigger as her lips initially appeared swollen, although no evidence of anaphylaxis on exam now. Im worried she will tire out due to her tachypnea with increased wob. Would therefore require admission for respiratory tx but also for observation should any signs of anaphylaxis return. - in ed CAP abx  - iv steroids      ED Course:   Initial assessment performed. The patients presenting problems have been discussed, and they are in agreement with the care plan formulated and outlined with them. I have encouraged them to ask questions as they arise throughout their visit. Consult Note:  Cassy Nelson MD spoke with  Dr Homero Fothergill, admitting hospitalist,   Discussed pt's hx, physical exam and available diagnostic and imaging results. Reviewed care plans.  Agree with management and plan thus far. DISPOSITION: ADMIT  Patient is being admitted to the hospital.  Their test results and reasons for admission have been discussed. The patient and/or available family express agreement with and understanding of the need to be admitted and their admission diagnosis. Thank you for resuming the care of this patient. Please don't hesitate to contact me in the emergency department if you  have any additional questions. Rajesh Balderas MD, MSc    CRITICAL CARE NOTE :    IMPENDING DETERIORATION -Airway and Respiratory  ASSOCIATED RISK FACTORS - Hypoxia  MANAGEMENT- Bedside Assessment  INTERPRETATION -  Xrays, ECG and Blood Pressure  INTERVENTIONS - hemodynamic mngmt and respiratory management  CASE REVIEW - Hospitalist and Nursing  TREATMENT RESPONSE -Improved  PERFORMED BY - Self    NOTES   :    I have spent 40 minutes of critical care time involved in lab review, consultations with specialist, family decision- making, bedside attention and documentation. During this entire length of time I was immediately available to the patient . Critical Care: The reason for providing this level of medical care for this critically ill patient was due to a critical illness that impaired one or more vital organ systems, such that there was a high probability of imminent or life threatening deterioration in the patient's condition. This care involved high complexity decision making to assess, manipulate, and support vital system functions, to treat this degree of vital organ system failure, and to prevent further life threatening deterioration of the patients condition. Rajesh Balderas MD      I, Kelly Smith MD, am the attending of record for this patient encounter. Diagnosis     Clinical Impression:   1. Acute hypoxemic respiratory failure (HCC)    2. Pneumonia due to COVID-19 virus    3. Anaphylaxis, initial encounter    4.  Dysphagia, unspecified type        Attestation:  I personally performed the services described in this documentation on this date 1/24/2021 for patient Ramiro Solis.   Eliza Gann MD

## 2021-01-24 NOTE — ED NOTES
TRANSFER - IN REPORT:    Verbal report received from Darell Duong on [employer]  being received     Report consisted of patients Situation, Background, Assessment and   Recommendations(SBAR). Information from the following report(s) SBAR was reviewed with the receiving nurse. Opportunity for questions and clarification was provided. Assessment completed upon patients arrival to unit and care assumed.

## 2021-01-25 LAB
ALBUMIN SERPL-MCNC: 2.7 G/DL (ref 3.5–5)
ALBUMIN/GLOB SERPL: 0.6 {RATIO} (ref 1.1–2.2)
ALP SERPL-CCNC: 103 U/L (ref 45–117)
ALT SERPL-CCNC: 44 U/L (ref 12–78)
ANION GAP SERPL CALC-SCNC: 7 MMOL/L (ref 5–15)
AST SERPL-CCNC: 54 U/L (ref 15–37)
BASOPHILS # BLD: 0 K/UL (ref 0–0.1)
BASOPHILS NFR BLD: 0 % (ref 0–1)
BILIRUB SERPL-MCNC: 0.5 MG/DL (ref 0.2–1)
BUN SERPL-MCNC: 11 MG/DL (ref 6–20)
BUN/CREAT SERPL: 13 (ref 12–20)
CALCIUM SERPL-MCNC: 8.7 MG/DL (ref 8.5–10.1)
CHLORIDE SERPL-SCNC: 105 MMOL/L (ref 97–108)
CO2 SERPL-SCNC: 24 MMOL/L (ref 21–32)
CREAT SERPL-MCNC: 0.84 MG/DL (ref 0.55–1.02)
CRP SERPL-MCNC: 11.4 MG/DL (ref 0–0.6)
D DIMER PPP FEU-MCNC: 0.71 MG/L FEU (ref 0–0.65)
DIFFERENTIAL METHOD BLD: ABNORMAL
EOSINOPHIL # BLD: 0 K/UL (ref 0–0.4)
EOSINOPHIL NFR BLD: 0 % (ref 0–7)
ERYTHROCYTE [DISTWIDTH] IN BLOOD BY AUTOMATED COUNT: 22.3 % (ref 11.5–14.5)
FIBRINOGEN PPP-MCNC: 611 MG/DL (ref 200–475)
FLUID CULTURE, SPNG2: NORMAL
GLOBULIN SER CALC-MCNC: 4.3 G/DL (ref 2–4)
GLUCOSE BLD STRIP.AUTO-MCNC: 237 MG/DL (ref 65–100)
GLUCOSE BLD STRIP.AUTO-MCNC: 315 MG/DL (ref 65–100)
GLUCOSE BLD STRIP.AUTO-MCNC: 326 MG/DL (ref 65–100)
GLUCOSE BLD STRIP.AUTO-MCNC: 331 MG/DL (ref 65–100)
GLUCOSE SERPL-MCNC: 227 MG/DL (ref 65–100)
HCT VFR BLD AUTO: 32.8 % (ref 35–47)
HGB BLD-MCNC: 9.9 G/DL (ref 11.5–16)
IMM GRANULOCYTES # BLD AUTO: 0 K/UL (ref 0–0.04)
IMM GRANULOCYTES NFR BLD AUTO: 0 % (ref 0–0.5)
INR PPP: 1 (ref 0.9–1.1)
L PNEUMO1 AG UR QL IA: NEGATIVE
LYMPHOCYTES # BLD: 1.2 K/UL (ref 0.8–3.5)
LYMPHOCYTES NFR BLD: 16 % (ref 12–49)
MAGNESIUM SERPL-MCNC: 2.2 MG/DL (ref 1.6–2.4)
MCH RBC QN AUTO: 20.5 PG (ref 26–34)
MCHC RBC AUTO-ENTMCNC: 30.2 G/DL (ref 30–36.5)
MCV RBC AUTO: 67.8 FL (ref 80–99)
MONOCYTES # BLD: 0.3 K/UL (ref 0–1)
MONOCYTES NFR BLD: 4 % (ref 5–13)
NEUTS SEG # BLD: 5.7 K/UL (ref 1.8–8)
NEUTS SEG NFR BLD: 80 % (ref 32–75)
NRBC # BLD: 0 K/UL (ref 0–0.01)
NRBC BLD-RTO: 0 PER 100 WBC
ORGANISM ID, SPNG3: NORMAL
PLATELET # BLD AUTO: 216 K/UL (ref 150–400)
PLEASE NOTE, SPNG4: NORMAL
PMV BLD AUTO: 10.3 FL (ref 8.9–12.9)
POTASSIUM SERPL-SCNC: 3.5 MMOL/L (ref 3.5–5.1)
PROT SERPL-MCNC: 7 G/DL (ref 6.4–8.2)
PROTHROMBIN TIME: 10.7 SEC (ref 9–11.1)
RBC # BLD AUTO: 4.84 M/UL (ref 3.8–5.2)
RBC MORPH BLD: ABNORMAL
RBC MORPH BLD: ABNORMAL
S PNEUM AG SPEC QL LA: NEGATIVE
SERVICE CMNT-IMP: ABNORMAL
SODIUM SERPL-SCNC: 136 MMOL/L (ref 136–145)
SPECIMEN SOURCE: NORMAL
SPECIMEN SOURCE: NORMAL
SPECIMEN, SPNG1: NORMAL
WBC # BLD AUTO: 7.2 K/UL (ref 3.6–11)

## 2021-01-25 PROCEDURE — 74011000258 HC RX REV CODE- 258: Performed by: INTERNAL MEDICINE

## 2021-01-25 PROCEDURE — 83735 ASSAY OF MAGNESIUM: CPT

## 2021-01-25 PROCEDURE — 74011000250 HC RX REV CODE- 250: Performed by: HOSPITALIST

## 2021-01-25 PROCEDURE — 74011250637 HC RX REV CODE- 250/637: Performed by: HOSPITALIST

## 2021-01-25 PROCEDURE — 80053 COMPREHEN METABOLIC PANEL: CPT

## 2021-01-25 PROCEDURE — 82962 GLUCOSE BLOOD TEST: CPT

## 2021-01-25 PROCEDURE — 74011250637 HC RX REV CODE- 250/637: Performed by: INTERNAL MEDICINE

## 2021-01-25 PROCEDURE — 96365 THER/PROPH/DIAG IV INF INIT: CPT

## 2021-01-25 PROCEDURE — 74011000250 HC RX REV CODE- 250: Performed by: INTERNAL MEDICINE

## 2021-01-25 PROCEDURE — 74011250636 HC RX REV CODE- 250/636: Performed by: NURSE PRACTITIONER

## 2021-01-25 PROCEDURE — 36415 COLL VENOUS BLD VENIPUNCTURE: CPT

## 2021-01-25 PROCEDURE — C9113 INJ PANTOPRAZOLE SODIUM, VIA: HCPCS | Performed by: HOSPITALIST

## 2021-01-25 PROCEDURE — 85025 COMPLETE CBC W/AUTO DIFF WBC: CPT

## 2021-01-25 PROCEDURE — 85384 FIBRINOGEN ACTIVITY: CPT

## 2021-01-25 PROCEDURE — 74011250637 HC RX REV CODE- 250/637: Performed by: NURSE PRACTITIONER

## 2021-01-25 PROCEDURE — 96372 THER/PROPH/DIAG INJ SC/IM: CPT

## 2021-01-25 PROCEDURE — 96375 TX/PRO/DX INJ NEW DRUG ADDON: CPT

## 2021-01-25 PROCEDURE — 74011250636 HC RX REV CODE- 250/636: Performed by: INTERNAL MEDICINE

## 2021-01-25 PROCEDURE — 85610 PROTHROMBIN TIME: CPT

## 2021-01-25 PROCEDURE — 85379 FIBRIN DEGRADATION QUANT: CPT

## 2021-01-25 PROCEDURE — 74011636637 HC RX REV CODE- 636/637: Performed by: HOSPITALIST

## 2021-01-25 PROCEDURE — 94640 AIRWAY INHALATION TREATMENT: CPT

## 2021-01-25 PROCEDURE — 86140 C-REACTIVE PROTEIN: CPT

## 2021-01-25 PROCEDURE — 74011250636 HC RX REV CODE- 250/636: Performed by: HOSPITALIST

## 2021-01-25 PROCEDURE — 65270000029 HC RM PRIVATE

## 2021-01-25 RX ORDER — OMEPRAZOLE 40 MG/1
40 CAPSULE, DELAYED RELEASE ORAL DAILY
COMMUNITY

## 2021-01-25 RX ORDER — ARIPIPRAZOLE 5 MG/1
10 TABLET ORAL DAILY
COMMUNITY

## 2021-01-25 RX ADMIN — DEXAMETHASONE 6 MG: 4 TABLET ORAL at 08:14

## 2021-01-25 RX ADMIN — ALBUTEROL SULFATE 2 PUFF: 90 AEROSOL, METERED RESPIRATORY (INHALATION) at 08:21

## 2021-01-25 RX ADMIN — ALBUTEROL SULFATE 2 PUFF: 90 AEROSOL, METERED RESPIRATORY (INHALATION) at 16:26

## 2021-01-25 RX ADMIN — OXYCODONE HYDROCHLORIDE AND ACETAMINOPHEN 500 MG: 500 TABLET ORAL at 09:27

## 2021-01-25 RX ADMIN — Medication 10 ML: at 16:27

## 2021-01-25 RX ADMIN — Medication 1 CAPSULE: at 09:27

## 2021-01-25 RX ADMIN — INSULIN LISPRO 3 UNITS: 100 INJECTION, SOLUTION INTRAVENOUS; SUBCUTANEOUS at 07:30

## 2021-01-25 RX ADMIN — ATORVASTATIN CALCIUM 20 MG: 20 TABLET, FILM COATED ORAL at 22:01

## 2021-01-25 RX ADMIN — METFORMIN HYDROCHLORIDE 1000 MG: 500 TABLET ORAL at 08:14

## 2021-01-25 RX ADMIN — TROSPIUM CHLORIDE 20 MG: 20 TABLET, FILM COATED ORAL at 17:15

## 2021-01-25 RX ADMIN — ARIPIPRAZOLE 10 MG: 5 TABLET ORAL at 08:19

## 2021-01-25 RX ADMIN — BUDESONIDE AND FORMOTEROL FUMARATE DIHYDRATE 2 PUFF: 160; 4.5 AEROSOL RESPIRATORY (INHALATION) at 09:51

## 2021-01-25 RX ADMIN — ALBUTEROL SULFATE 2 PUFF: 90 AEROSOL, METERED RESPIRATORY (INHALATION) at 12:38

## 2021-01-25 RX ADMIN — ENOXAPARIN SODIUM 30 MG: 30 INJECTION SUBCUTANEOUS at 05:15

## 2021-01-25 RX ADMIN — METFORMIN HYDROCHLORIDE 1000 MG: 500 TABLET ORAL at 16:25

## 2021-01-25 RX ADMIN — BUPROPION HYDROCHLORIDE 150 MG: 150 TABLET, EXTENDED RELEASE ORAL at 08:19

## 2021-01-25 RX ADMIN — TROSPIUM CHLORIDE 20 MG: 20 TABLET, FILM COATED ORAL at 08:18

## 2021-01-25 RX ADMIN — METOPROLOL SUCCINATE 25 MG: 50 TABLET, EXTENDED RELEASE ORAL at 08:20

## 2021-01-25 RX ADMIN — INSULIN LISPRO 4 UNITS: 100 INJECTION, SOLUTION INTRAVENOUS; SUBCUTANEOUS at 22:01

## 2021-01-25 RX ADMIN — CEFTRIAXONE 1 G: 1 INJECTION, POWDER, FOR SOLUTION INTRAMUSCULAR; INTRAVENOUS at 05:15

## 2021-01-25 RX ADMIN — REMDESIVIR 100 MG: 100 INJECTION, POWDER, LYOPHILIZED, FOR SOLUTION INTRAVENOUS at 16:26

## 2021-01-25 RX ADMIN — ENOXAPARIN SODIUM 30 MG: 30 INJECTION SUBCUTANEOUS at 17:15

## 2021-01-25 RX ADMIN — TOPIRAMATE 50 MG: 25 TABLET, FILM COATED ORAL at 08:17

## 2021-01-25 RX ADMIN — INSULIN LISPRO 7 UNITS: 100 INJECTION, SOLUTION INTRAVENOUS; SUBCUTANEOUS at 16:25

## 2021-01-25 RX ADMIN — SODIUM CHLORIDE 40 MG: 9 INJECTION, SOLUTION INTRAMUSCULAR; INTRAVENOUS; SUBCUTANEOUS at 08:15

## 2021-01-25 RX ADMIN — INSULIN LISPRO 7 UNITS: 100 INJECTION, SOLUTION INTRAVENOUS; SUBCUTANEOUS at 11:30

## 2021-01-25 RX ADMIN — Medication 10 ML: at 08:22

## 2021-01-25 RX ADMIN — AZITHROMYCIN MONOHYDRATE 500 MG: 500 INJECTION, POWDER, LYOPHILIZED, FOR SOLUTION INTRAVENOUS at 05:15

## 2021-01-25 RX ADMIN — Medication 5 ML: at 22:02

## 2021-01-25 NOTE — PROGRESS NOTES
Speech path  Patient was referred for a swallowing evaluation. She has covid19 and nsg completed the STAND; patient tolerated all well. She is on a clear liquid diet and tolerating well per nsg. She has good dentition and we will advance her to [facility] soft. If the patient needs a downgrade, she could have a pureed diet. She has a small hiatal hernia and reflux. Mild dysmotility noted as well. Diet change to be made.    Marc Walsh, SLP

## 2021-01-25 NOTE — PROGRESS NOTES
Pharmacy Clarification of the Prior to Admission Medication Regimen Retrospective to the Admission Medication Reconciliation    The patient was not interviewed regarding clarification of the prior to admission medication regimen. Called and spoke to pharmacist at [facility] to verify current medications       Patient was not questioned regarding use of any other inhalers, topical products, over the counter medications, herbal medications, vitamin products or ophthalmic/nasal/otic medication use. Information Obtained From: [facility]    Recommendations/Findings: The following amendments were made to the patient's active medication list on file at [facility]:     1) Additions:       2) Removals:   Breo ellipta  Naproxen      3) Changes:  abilify (Old regimen: 10 mg /New regimen: 5 mg)      4) Pertinent Pharmacy Findings:  Updated patients preferred outpatient pharmacy to: [facility]       [facility] medication list was corrected to the following:     Prior to Admission Medications   Prescriptions Last Dose Informant Taking? ADVAIR DISKUS 250-50 mcg/dose diskus inhaler  Other Yes   Sig: Take 1 Puff by inhalation two (2) times a day. ARIPiprazole (Abilify) 5 mg tablet 2021 at Unknown time Other Yes   Sig: Take 5 mg by mouth daily. albuterol (PROVENTIL HFA, VENTOLIN HFA) 90 mcg/actuation inhaler  Other Yes   Sig: Take 2 Puffs by inhalation every four (4) hours as needed. atorvastatin (LIPITOR) 20 mg tablet  Other Yes   Si mg = 1 tab each dose, PO, bedtime, 0 Refills   buPROPion XL (WELLBUTRIN XL) 150 mg tablet  Other Yes   Sig: Take 150 mg by mouth daily. cyclobenzaprine (FLEXERIL) 10 mg tablet  Other Yes   Sig: Take 1 Tab by mouth three (3) times daily as needed for Muscle Spasm(s). lisinopril (PRINIVIL, ZESTRIL) 10 mg tablet  Other Yes   Sig: Take 10 mg by mouth daily. metoprolol succinate (TOPROL-XL) 25 mg XL tablet  Other Yes   Sig: Take 1 Tab by mouth daily.    mirabegron ER (MYRBETRIQ) 25 mg ER tablet Other Yes   Sig: Take 25 mg by mouth daily. omeprazole (PRILOSEC) 40 mg capsule  Other Yes   Sig: Take 40 mg by mouth daily. phentermine 37.5 mg capsule  Other Yes   Sig: take 1 capsule by mouth every morning   topiramate (TOPAMAX) 50 mg tablet  Other Yes   Sig: Take 50 mg by mouth nightly.       Facility-Administered Medications: None        Thank you,  [facility]  Medication History Pharmacy Technician

## 2021-01-25 NOTE — PROGRESS NOTES
Hospitalist Progress Note    NAME: Yuniel Teague   :  1965   MRN:  [id number]     I reviewed pertinent labs and imaging, and discussed /agreed on the plan of care with Dr. Trinidad Chiu. Assessment / Plan:  Acute Hypoxic Respiratory Failure with COVID-19 Pneumonia 92% on RA, RR 37  Positive COVID test   COPD Exacerbation   Tachycardia   · CXR  - Bilateral lower lobe airspace opacification   · Continue Azithromycin and Ceftriaxone - Day 2  · Continue Dexamethasone - Day 12  · Start Remdesivir - Day 2   · Follow LFTs   · Start Vitamin C and Zinc   · Follow inflammatory markers  · Continue lovenox   · Ddimer slightly elevated at 0.71 - no need for full dose lovenox currently   · Requiring 2 LPM NC - wean as tolerated   · Afebrile overnight     · Continue inhalers      Angioedema PTA  Dysphagia   · Angioedema reported by EMS and was given epi and benadryl in route to hospital  · Stopped ACE   · Angioedema now resolved  · Patient reports trouble swallowing - have Speech Evaluation   · Recent GI notes from 2020 - unremarkable EGD and suggested esophagogram XR of esophagus     Hypokalemia   · K 3.5   · Follow BMP     Type II Diabetes  Morbid Obesity   ·  HgbA1c 8.8  · Patient stated she takes Metformin BID - resume   · SSI   · Glucose elevated with steroid    Hypertension   Hyperlipidemia  · Stop Lisinopril with reported angioedema  · Continue metoprolol and atorvastatin   · Monitor      Patient reports taking topamax - Thinks it was for diet/weight loss, reported taking phentermine which has been taken off the market. Ask pharmacy to complete a medication reconciliation     Depression Continue aripiprazole, bupropion  GERD Continue protonix - allergy to H2 Blockers   Tobacco Abuse Stopped smoking when diagnosed with COVID      40 or above Morbid obesity / Body mass index is 42.97 kg/m².     Code status: Full  Prophylaxis: Lovenox  Recommended Disposition: Home w/Family     Subjective:     Chief Complaint / Reason for Physician Visit  Patient seen at bedside, has no complaints. Denies any further angioedema. Discussed plan of care, she is in agreement. Discussed with RN events overnight. Review of Systems:  Symptom Y/N Comments  Symptom Y/N Comments   Fever/Chills n   Chest Pain n    Poor Appetite n   Edema n    Cough y   Abdominal Pain n    Sputum n   Joint Pain n    SOB/BARNES y   Pruritis/Rash n    Nausea/vomit n   Tolerating PT/OT     Diarrhea n   Tolerating Diet y    Constipation n   Other       Could NOT obtain due to:      Objective:     VITALS:   Last 24hrs VS reviewed since prior progress note. Most recent are:  Patient Vitals for the past 24 hrs:   Temp Pulse Resp BP SpO2   01/25/21 0952     100 %   01/25/21 0809 98.1 °F (36.7 °C) 88 (!) 34 128/71 99 %   01/25/21 0600 98.6 °F (37 °C) 90 27 127/68 97 %   01/25/21 0431  95 20  100 %   01/25/21 0400 98.6 °F (37 °C) 92 (!) 37 (!) 140/37 98 %   01/25/21 0000 97.5 °F (36.4 °C) 89 (!) 33 (!) 146/78 100 %   01/24/21 2142     100 %   01/24/21 1930 98.4 °F (36.9 °C) 94 (!) 36 (!) 148/84 100 %   01/24/21 1630 98.1 °F (36.7 °C) 97 (!) 34 (!) 153/85 99 %   01/24/21 1614     91 %   01/24/21 1500  97 (!) 32 (!) 149/86 99 %   01/24/21 1415  100 26 133/76 96 %   01/24/21 1330  99 (!) 31 (!) 167/95 96 %   01/24/21 1237  (!) 105 (!) 38  96 %   01/24/21 1230  (!) 103 21 136/77 97 %   01/24/21 1145  (!) 103 27 138/80 95 %   01/24/21 1140 99.2 °F (37.3 °C) (!) 101   95 %   01/24/21 1102  (!) 122 (!) 32 (!) 154/78 94 %       Intake/Output Summary (Last 24 hours) at 1/25/2021 1036  Last data filed at 1/25/2021 0700  Gross per 24 hour   Intake 50 ml   Output 400 ml   Net -350 ml        I had a face to face encounter and independently examined this patient on 1/25/2021, as outlined below:  PHYSICAL EXAM:  General: WD, WN. Alert, cooperative, AAF in no acute distress wearing 2 LPM NC   EENT:  EOMI. Anicteric sclerae.  MMM  Resp:  LS coarse, no wheezing or rales. No accessory muscle use  CV:  Regular  rhythm,  No edema  GI:  Soft, obese, Non tender. +Bowel sounds  Neurologic:  Alert and oriented X 3, normal speech,   Psych:   Good insight. Not anxious nor agitated  Skin:  No rashes. No jaundice    Reviewed most current lab test results and cultures  YES  Reviewed most current radiology test results   YES  Review and summation of old records today    NO  Reviewed patient's current orders and MAR    YES  PMH/SH reviewed - no change compared to H&P  ________________________________________________________________________  Care Plan discussed with:    Comments   Patient x    Family      RN x    Care Manager     Consultant                        Multidiciplinary team rounds were held today with , nursing, pharmacist and clinical coordinator. Patient's plan of care was discussed; medications were reviewed and discharge planning was addressed. ________________________________________________________________________  Total NON critical care TIME:  25   Minutes    Total CRITICAL CARE TIME Spent:   Minutes non procedure based      Comments   >50% of visit spent in counseling and coordination of care x    ________________________________________________________________________  Svetlana Zuñiga NP     Procedures: see electronic medical records for all procedures/Xrays and details which were not copied into this note but were reviewed prior to creation of Plan. LABS:  I reviewed today's most current labs and imaging studies.   Pertinent labs include:  Recent Labs     01/25/21  0332 01/24/21  0424   WBC 7.2 5.7   HGB 9.9* 10.5*   HCT 32.8* 34.4*    210     Recent Labs     01/25/21  0332 01/24/21  0656 01/24/21  0424     --  132*   K 3.5  --  3.4*     --  98   CO2 24  --  26   *  --  217*   BUN 11  --  9   CREA 0.84  --  1.00   CA 8.7  --  8.0*   MG 2.2 1.9  --    ALB 2.7*  --  2.9*   TBILI 0.5  --  0.8   ALT 44  --  52 INR 1.0  --   --        Signed: Smooth Balderrama NP

## 2021-01-25 NOTE — ED NOTES
TRANSFER - IN REPORT:    Verbal report received from  Jacob (name) on [facility]. Report consisted of patients Situation, Background, Assessment and   Recommendations(SBAR). Information from the following report(s) SBAR and ED Summary was reviewed with the receiving nurse. Opportunity for questions and clarification was provided.       0810 Pt resting In stretcher in POC, No complaints at this time     0927 Provided pt w/ breakfast tray and warm wash cloths per requested     0931 NP at bedside evaluating pt     0941 RT at bedside     1020 Pt ambulate to [facility] W/o diffuclty    1220 Pt sitting up on the side of the bed, Provided pt with her lunch tray     1415 Pt sleeping in stretcher in POC, Pt remains on the monitor x 3    1523 Pt sitting on side of the bed w/ no complaints at this time     1640 Pt sitting on the side of the bed, talking on her cell phone no complaints at this time     1736 Provided dinner tray to pt, Pt sitting up on the side of bell tolerating soft diet well

## 2021-01-25 NOTE — ED NOTES
Bedside and Verbal shift change report given to Shalini Bartlett RN (oncoming nurse) by Rohini Hartley RN (offgoing nurse). Report included the following information SBAR, ED Summary, MAR and Recent Results.

## 2021-01-26 LAB
ALBUMIN SERPL-MCNC: 2.6 G/DL (ref 3.5–5)
ALBUMIN/GLOB SERPL: 0.6 {RATIO} (ref 1.1–2.2)
ALP SERPL-CCNC: 100 U/L (ref 45–117)
ALT SERPL-CCNC: 38 U/L (ref 12–78)
ANION GAP SERPL CALC-SCNC: 8 MMOL/L (ref 5–15)
AST SERPL-CCNC: 44 U/L (ref 15–37)
BASOPHILS # BLD: 0 K/UL (ref 0–0.1)
BASOPHILS NFR BLD: 0 % (ref 0–1)
BILIRUB SERPL-MCNC: 0.5 MG/DL (ref 0.2–1)
BUN SERPL-MCNC: 15 MG/DL (ref 6–20)
BUN/CREAT SERPL: 17 (ref 12–20)
CALCIUM SERPL-MCNC: 8.4 MG/DL (ref 8.5–10.1)
CHLORIDE SERPL-SCNC: 106 MMOL/L (ref 97–108)
CO2 SERPL-SCNC: 22 MMOL/L (ref 21–32)
CREAT SERPL-MCNC: 0.88 MG/DL (ref 0.55–1.02)
D DIMER PPP FEU-MCNC: 0.75 MG/L FEU (ref 0–0.65)
DIFFERENTIAL METHOD BLD: ABNORMAL
EOSINOPHIL # BLD: 0 K/UL (ref 0–0.4)
EOSINOPHIL NFR BLD: 0 % (ref 0–7)
ERYTHROCYTE [DISTWIDTH] IN BLOOD BY AUTOMATED COUNT: 22.4 % (ref 11.5–14.5)
FIBRINOGEN PPP-MCNC: 638 MG/DL (ref 200–475)
GLOBULIN SER CALC-MCNC: 4.4 G/DL (ref 2–4)
GLUCOSE BLD STRIP.AUTO-MCNC: 193 MG/DL (ref 65–100)
GLUCOSE BLD STRIP.AUTO-MCNC: 289 MG/DL (ref 65–100)
GLUCOSE BLD STRIP.AUTO-MCNC: 292 MG/DL (ref 65–100)
GLUCOSE BLD STRIP.AUTO-MCNC: 298 MG/DL (ref 65–100)
GLUCOSE SERPL-MCNC: 172 MG/DL (ref 65–100)
HCT VFR BLD AUTO: 34 % (ref 35–47)
HGB BLD-MCNC: 10.1 G/DL (ref 11.5–16)
IMM GRANULOCYTES # BLD AUTO: 0 K/UL (ref 0–0.04)
IMM GRANULOCYTES NFR BLD AUTO: 0 % (ref 0–0.5)
INR PPP: 1 (ref 0.9–1.1)
LYMPHOCYTES # BLD: 0.8 K/UL (ref 0.8–3.5)
LYMPHOCYTES NFR BLD: 8 % (ref 12–49)
MCH RBC QN AUTO: 20.5 PG (ref 26–34)
MCHC RBC AUTO-ENTMCNC: 29.7 G/DL (ref 30–36.5)
MCV RBC AUTO: 69 FL (ref 80–99)
MONOCYTES # BLD: 0 K/UL (ref 0–1)
MONOCYTES NFR BLD: 0 % (ref 5–13)
NEUTS SEG # BLD: 9.4 K/UL (ref 1.8–8)
NEUTS SEG NFR BLD: 92 % (ref 32–75)
NRBC # BLD: 0 K/UL (ref 0–0.01)
NRBC BLD-RTO: 0 PER 100 WBC
PLATELET # BLD AUTO: 252 K/UL (ref 150–400)
PMV BLD AUTO: 10 FL (ref 8.9–12.9)
POTASSIUM SERPL-SCNC: 3.4 MMOL/L (ref 3.5–5.1)
PROT SERPL-MCNC: 7 G/DL (ref 6.4–8.2)
PROTHROMBIN TIME: 10.8 SEC (ref 9–11.1)
RBC # BLD AUTO: 4.93 M/UL (ref 3.8–5.2)
RBC MORPH BLD: ABNORMAL
SERVICE CMNT-IMP: ABNORMAL
SODIUM SERPL-SCNC: 136 MMOL/L (ref 136–145)
WBC # BLD AUTO: 10.2 K/UL (ref 3.6–11)
WBC MORPH BLD: ABNORMAL

## 2021-01-26 PROCEDURE — 65270000029 HC RM PRIVATE

## 2021-01-26 PROCEDURE — 82962 GLUCOSE BLOOD TEST: CPT

## 2021-01-26 PROCEDURE — 74011636637 HC RX REV CODE- 636/637: Performed by: HOSPITALIST

## 2021-01-26 PROCEDURE — 99284 EMERGENCY DEPT VISIT MOD MDM: CPT

## 2021-01-26 PROCEDURE — 74011250637 HC RX REV CODE- 250/637: Performed by: INTERNAL MEDICINE

## 2021-01-26 PROCEDURE — 74011250636 HC RX REV CODE- 250/636: Performed by: INTERNAL MEDICINE

## 2021-01-26 PROCEDURE — 74011250637 HC RX REV CODE- 250/637: Performed by: NURSE PRACTITIONER

## 2021-01-26 PROCEDURE — 80053 COMPREHEN METABOLIC PANEL: CPT

## 2021-01-26 PROCEDURE — 85379 FIBRIN DEGRADATION QUANT: CPT

## 2021-01-26 PROCEDURE — 74011250637 HC RX REV CODE- 250/637: Performed by: HOSPITALIST

## 2021-01-26 PROCEDURE — 85610 PROTHROMBIN TIME: CPT

## 2021-01-26 PROCEDURE — 74011000250 HC RX REV CODE- 250: Performed by: INTERNAL MEDICINE

## 2021-01-26 PROCEDURE — 85384 FIBRINOGEN ACTIVITY: CPT

## 2021-01-26 PROCEDURE — 74011250637 HC RX REV CODE- 250/637: Performed by: STUDENT IN AN ORGANIZED HEALTH CARE EDUCATION/TRAINING PROGRAM

## 2021-01-26 PROCEDURE — 94640 AIRWAY INHALATION TREATMENT: CPT

## 2021-01-26 PROCEDURE — 74011000258 HC RX REV CODE- 258: Performed by: INTERNAL MEDICINE

## 2021-01-26 PROCEDURE — C9113 INJ PANTOPRAZOLE SODIUM, VIA: HCPCS | Performed by: HOSPITALIST

## 2021-01-26 PROCEDURE — 94760 N-INVAS EAR/PLS OXIMETRY 1: CPT

## 2021-01-26 PROCEDURE — 74011000250 HC RX REV CODE- 250: Performed by: HOSPITALIST

## 2021-01-26 PROCEDURE — 85025 COMPLETE CBC W/AUTO DIFF WBC: CPT

## 2021-01-26 PROCEDURE — 74011250636 HC RX REV CODE- 250/636: Performed by: HOSPITALIST

## 2021-01-26 RX ORDER — ARIPIPRAZOLE 5 MG/1
5 TABLET ORAL DAILY
Status: DISCONTINUED | OUTPATIENT
Start: 2021-01-27 | End: 2021-01-29 | Stop reason: HOSPADM

## 2021-01-26 RX ORDER — GUAIFENESIN/DEXTROMETHORPHAN 100-10MG/5
5 SYRUP ORAL 4 TIMES DAILY
Status: DISCONTINUED | OUTPATIENT
Start: 2021-01-26 | End: 2021-01-29 | Stop reason: HOSPADM

## 2021-01-26 RX ORDER — POTASSIUM CHLORIDE 750 MG/1
20 TABLET, FILM COATED, EXTENDED RELEASE ORAL
Status: COMPLETED | OUTPATIENT
Start: 2021-01-26 | End: 2021-01-26

## 2021-01-26 RX ADMIN — ARIPIPRAZOLE 10 MG: 5 TABLET ORAL at 08:15

## 2021-01-26 RX ADMIN — OXYCODONE HYDROCHLORIDE AND ACETAMINOPHEN 500 MG: 500 TABLET ORAL at 08:06

## 2021-01-26 RX ADMIN — Medication 5 ML: at 06:53

## 2021-01-26 RX ADMIN — REMDESIVIR 100 MG: 100 INJECTION, POWDER, LYOPHILIZED, FOR SOLUTION INTRAVENOUS at 17:23

## 2021-01-26 RX ADMIN — ALBUTEROL SULFATE 2 PUFF: 90 AEROSOL, METERED RESPIRATORY (INHALATION) at 09:01

## 2021-01-26 RX ADMIN — INSULIN LISPRO 5 UNITS: 100 INJECTION, SOLUTION INTRAVENOUS; SUBCUTANEOUS at 11:30

## 2021-01-26 RX ADMIN — TROSPIUM CHLORIDE 20 MG: 20 TABLET, FILM COATED ORAL at 17:52

## 2021-01-26 RX ADMIN — DEXAMETHASONE 6 MG: 4 TABLET ORAL at 08:06

## 2021-01-26 RX ADMIN — Medication 1 CAPSULE: at 08:06

## 2021-01-26 RX ADMIN — ALBUTEROL SULFATE 2 PUFF: 90 AEROSOL, METERED RESPIRATORY (INHALATION) at 11:58

## 2021-01-26 RX ADMIN — METFORMIN HYDROCHLORIDE 1000 MG: 500 TABLET ORAL at 08:06

## 2021-01-26 RX ADMIN — GUAIFENESIN AND DEXTROMETHORPHAN 5 ML: 100; 10 SYRUP ORAL at 12:19

## 2021-01-26 RX ADMIN — BUPROPION HYDROCHLORIDE 150 MG: 150 TABLET, EXTENDED RELEASE ORAL at 08:06

## 2021-01-26 RX ADMIN — TOPIRAMATE 50 MG: 25 TABLET, FILM COATED ORAL at 08:06

## 2021-01-26 RX ADMIN — SODIUM CHLORIDE 40 MG: 9 INJECTION, SOLUTION INTRAMUSCULAR; INTRAVENOUS; SUBCUTANEOUS at 08:08

## 2021-01-26 RX ADMIN — BUDESONIDE AND FORMOTEROL FUMARATE DIHYDRATE 2 PUFF: 160; 4.5 AEROSOL RESPIRATORY (INHALATION) at 09:02

## 2021-01-26 RX ADMIN — BUDESONIDE AND FORMOTEROL FUMARATE DIHYDRATE 2 PUFF: 160; 4.5 AEROSOL RESPIRATORY (INHALATION) at 20:38

## 2021-01-26 RX ADMIN — INSULIN LISPRO 3 UNITS: 100 INJECTION, SOLUTION INTRAVENOUS; SUBCUTANEOUS at 22:14

## 2021-01-26 RX ADMIN — GUAIFENESIN AND DEXTROMETHORPHAN 5 ML: 100; 10 SYRUP ORAL at 17:25

## 2021-01-26 RX ADMIN — CEFTRIAXONE 1 G: 1 INJECTION, POWDER, FOR SOLUTION INTRAMUSCULAR; INTRAVENOUS at 06:53

## 2021-01-26 RX ADMIN — ATORVASTATIN CALCIUM 20 MG: 20 TABLET, FILM COATED ORAL at 22:14

## 2021-01-26 RX ADMIN — AZITHROMYCIN MONOHYDRATE 500 MG: 500 INJECTION, POWDER, LYOPHILIZED, FOR SOLUTION INTRAVENOUS at 06:53

## 2021-01-26 RX ADMIN — METFORMIN HYDROCHLORIDE 1000 MG: 500 TABLET ORAL at 17:28

## 2021-01-26 RX ADMIN — ENOXAPARIN SODIUM 30 MG: 30 INJECTION SUBCUTANEOUS at 17:23

## 2021-01-26 RX ADMIN — TROSPIUM CHLORIDE 20 MG: 20 TABLET, FILM COATED ORAL at 08:15

## 2021-01-26 RX ADMIN — ENOXAPARIN SODIUM 30 MG: 30 INJECTION SUBCUTANEOUS at 06:52

## 2021-01-26 RX ADMIN — GUAIFENESIN AND DEXTROMETHORPHAN 5 ML: 100; 10 SYRUP ORAL at 22:14

## 2021-01-26 RX ADMIN — ONDANSETRON 4 MG: 2 INJECTION INTRAMUSCULAR; INTRAVENOUS at 11:24

## 2021-01-26 RX ADMIN — INSULIN LISPRO 2 UNITS: 100 INJECTION, SOLUTION INTRAVENOUS; SUBCUTANEOUS at 08:10

## 2021-01-26 RX ADMIN — ALBUTEROL SULFATE 2 PUFF: 90 AEROSOL, METERED RESPIRATORY (INHALATION) at 16:12

## 2021-01-26 RX ADMIN — ALBUTEROL SULFATE 2 PUFF: 90 AEROSOL, METERED RESPIRATORY (INHALATION) at 20:38

## 2021-01-26 RX ADMIN — POTASSIUM CHLORIDE 20 MEQ: 750 TABLET, FILM COATED, EXTENDED RELEASE ORAL at 17:25

## 2021-01-26 RX ADMIN — METOPROLOL SUCCINATE 25 MG: 50 TABLET, EXTENDED RELEASE ORAL at 08:10

## 2021-01-26 RX ADMIN — INSULIN LISPRO 5 UNITS: 100 INJECTION, SOLUTION INTRAVENOUS; SUBCUTANEOUS at 17:24

## 2021-01-26 NOTE — ED NOTES
Bedside shift change report given to Pilar  (oncoming nurse) by Isidro Mancuso  (offgoing nurse). Report included the following information SBAR, Kardex, ED Summary, [facility] and Recent Results.

## 2021-01-26 NOTE — ED NOTES
TRANSFER - OUT REPORT:    Verbal report given to [address] [place] [address] Street, RN(name) on Mirella Jiang  being transferred to Ortho Room 3242(unit) for routine progression of care       Report consisted of patients Situation, Background, Assessment and   Recommendations(SBAR). Information from the following report(s) ED Summary was reviewed with the receiving nurse. Lines:   Peripheral IV 01/24/21 Left Antecubital (Active)   Site Assessment Clean, dry, & intact 01/25/21 0000   Phlebitis Assessment 0 01/25/21 0000   Infiltration Assessment 0 01/25/21 0000   Dressing Status Clean, dry, & intact 01/25/21 0000   Dressing Type Transparent;Tape 01/25/21 0000   Hub Color/Line Status Green;Patent;Capped 01/25/21 0000        Opportunity for questions and clarification was provided.       Patient transported with:  Medications from pharmacy  Transport

## 2021-01-26 NOTE — PROGRESS NOTES
Reason for Admission:  COVID pneumonia; COPD exacerbation                   RUR Score:    21%              PCP: First and Last name:  Nia Steele    Name of Practice:    Are you a current patient: Yes/No:  Yes    Approximate date of last visit: about 6 months ago    Can you participate in a virtual visit if needed: Yes     Do you (patient/family) have any concerns for transition/discharge? Not indicated at this time               Plan for utilizing home health:   Not indicated at this time     Current Advanced Directive/Advance Care Plan:              Transition of Care Plan:       Pt is a 54year old female admitted to [facility] on 1/24/2021. Pt is alert and oriented x4 during initial assessment. Pt reports to live with her daughter in an apartment home. Pt is independent at baseline. Pt still drives. Pt has access to a cane, walker and CPAP at home. Pt's preferred pharmacy is  [address] [place] [address]. Pt anticipates her daughter transporting her home at discharge. Care Management Interventions  PCP Verified by CM:  Yes  Transition of Care Consult (CM Consult): Discharge Planning  Current Support Network: Relative's Home  Confirm Follow Up Transport: Self  Discharge Location  Discharge Placement: [address], [facility], [facility] [address] [facility],[address]

## 2021-01-26 NOTE — PROGRESS NOTES
End of Shift Note    Bedside shift change report given to Mario FRANKEL(oncoming nurse) by Antonio Alarcon RN (offgoing nurse). Report included the following information SBAR, Kardex, Intake/Output and Recent Results    Shift worked: night     Shift summary and any significant changes:    Uneventful night     Concerns for physician to address: none     Zone phone for oncoming shift:  [phone]       Activity:  Activity Level: Up with Assistance  Number times ambulated in hallways past shift: 0  Number of times OOB to chair past shift: 0    Cardiac:   Cardiac Monitoring: No          Access:   Current line(s): PIV     Genitourinary:   Urinary status: voiding    Respiratory:   O2 Device: Room air  Chronic home O2 use?: NO  Incentive spirometer at bedside: NO     GI:     Current diet:  DIET DIABETIC WITH OPTIONS Consistent Carb 1800kcal; Mechanical Soft; AHA-LOW-CHOL FAT  Passing flatus: YES  Tolerating current diet: YES       Pain Management:   Patient states pain is manageable on current regimen: N/A    Skin:  Sukumar Score: 23  Interventions: nutritional support     Patient Safety:  Fall Score:  Total Score: 2  Interventions: pt to call before getting OOB       Length of Stay:  Expected LOS: 5d 12h  Actual LOS: [facility], RN

## 2021-01-26 NOTE — PROGRESS NOTES
Speech path  nsg reported the patient is swallowing her [facility] soft /thins diet well. No overt s/s of aspiration with her diet. We will sign off.   Karen Lopez, SLP

## 2021-01-26 NOTE — PROGRESS NOTES
Hospitalist Progress Note    NAME: Gelacio Kenney   :  1965   MRN:  [id number]     I reviewed pertinent labs and imaging, and discussed /agreed on the plan of care with Dr. South Tiwari. Assessment / Plan:  Acute Hypoxic Respiratory Failure with COVID-19 Pneumonia 92% on RA, RR 37  Positive COVID test   COPD Exacerbation   Tachycardia   · CXR  - Bilateral lower lobe airspace opacification   · Continue Azithromycin and Ceftriaxone - Day 2  · Mometasone daily. · Start Remdesivir - Day 3, day on   · On Vitamin C and Zinc   · D-dimer 0.73, nearly stable, continue Lovenox. · Continue lovenox   · Afebrile overnight     · Continue inhalers    · Robitussin 5 mg 4 times daily. Angioedema PTA  Dysphagia   · Angioedema reported by EMS and was given epi and benadryl in route to hospital  · Stopped ACE   · Angioedema now resolved  · Speech therapy evaluated today tolerating diet well. · Recent GI notes from 2020 - unremarkable EGD and suggested esophagogram XR of esophagus will be done outpatient help patient is tolerating diet well. Hypokalemia   · K 3.4, KCL 20 meq today. · Follow BMP     Type II Diabetes  Morbid Obesity   Severe protein energy malnutrition  ·  HgbA1c 8.8  · Patient stated she takes Metformin BID - resume   · SSI   · Glucose elevated with steroid    Hypertension   Hyperlipidemia  · Stop Lisinopril with reported angioedema  · Continue metoprolol and atorvastatin   · Monitor        Depression Continue aripiprazole, bupropion  GERD Continue protonix - allergy to H2 Blockers   Tobacco Abuse Stopped smoking when diagnosed with COVID      40 or above Morbid obesity / Body mass index is 42.97 kg/m². Code status: Full  Prophylaxis: Lovenox  Recommended Disposition: Home w/Family     Subjective:     Chief Complaint / Reason for Physician Visit  Patient seen at bedside, has no complaints.   Denies any complaints, still having a lot of cough, not comfortable yet to go home.    Review of Systems:  Symptom Y/N Comments  Symptom Y/N Comments   Fever/Chills n   Chest Pain n    Poor Appetite n   Edema n    Cough y   Abdominal Pain n    Sputum n   Joint Pain n    SOB/BARNES y   Pruritis/Rash n    Nausea/vomit n   Tolerating PT/OT     Diarrhea n   Tolerating Diet y    Constipation n   Other             Objective:     VITALS:   Last 24hrs VS reviewed since prior progress note. Most recent are:  Patient Vitals for the past 24 hrs:   Temp Pulse Resp BP SpO2   01/26/21 1639 98.2 °F (36.8 °C) 89 18 136/71 95 %   01/26/21 1128 97.9 °F (36.6 °C) 90 18 (!) 141/76 97 %   01/26/21 0802 98.3 °F (36.8 °C) 90 20 132/77 95 %   01/25/21 2340 98.6 °F (37 °C) 94 20 128/76 95 %   01/25/21 2159 98.2 °F (36.8 °C) 100 20 126/74 95 %   01/25/21 2018 97.9 °F (36.6 °C) (!) 103 22 136/82 94 %       Intake/Output Summary (Last 24 hours) at 1/26/2021 1644  Last data filed at 1/26/2021 2033  Gross per 24 hour   Intake 300 ml   Output    Net 300 ml        I had a face to face encounter and independently examined this patient on 1/26/2021, as outlined below:  PHYSICAL EXAM:  General: WD, WN. Alert, cooperative, AAF in no acute distress wearing 2 LPM NC   EENT:  EOMI. Anicteric sclerae. MMM  Resp:  LS coarse, no wheezing or rales. No accessory muscle use  CV:  Regular  rhythm,  No edema  GI:  Soft, obese, Non tender. +Bowel sounds  Neurologic:  Alert and oriented X 3, normal speech,   Psych:   Good insight. Not anxious nor agitated  Skin:  No rashes.   No jaundice    Reviewed most current lab test results and cultures  YES  Reviewed most current radiology test results   YES  Review and summation of old records today    NO  Reviewed patient's current orders and MAR    YES  PMH/SH reviewed - no change compared to H&P  ________________________________________________________________________  Care Plan discussed with:    Comments   Patient x    Family      RN x    Care Manager     Consultant                       x Multidiciplinary team rounds were held today with , nursing, pharmacist and clinical coordinator. Patient's plan of care was discussed; medications were reviewed and discharge planning was addressed. ________________________________________________________________________  Total NON critical care TIME:  35   Minutes    Total CRITICAL CARE TIME Spent:   Minutes non procedure based      Comments   >50% of visit spent in counseling and coordination of care x    ________________________________________________________________________  Savannah Alvarado MD     Procedures: see electronic medical records for all procedures/Xrays and details which were not copied into this note but were reviewed prior to creation of Plan. LABS:  I reviewed today's most current labs and imaging studies.   Pertinent labs include:  Recent Labs     01/26/21  0651 01/25/21  0332 01/24/21 0424   WBC 10.2 7.2 5.7   HGB 10.1* 9.9* 10.5*   HCT 34.0* 32.8* 34.4*    216 210     Recent Labs     01/26/21  0651 01/25/21  0332 01/24/21  0656 01/24/21  0424    136  --  132*   K 3.4* 3.5  --  3.4*    105  --  98   CO2 22 24  --  26   * 227*  --  217*   BUN 15 11  --  9   CREA 0.88 0.84  --  1.00   CA 8.4* 8.7  --  8.0*   MG  --  2.2 1.9  --    ALB 2.6* 2.7*  --  2.9*   TBILI 0.5 0.5  --  0.8   ALT 38 44  --  52   INR 1.0 1.0  --   --        Signed: Savannah Alvarado MD

## 2021-01-27 LAB
ALBUMIN SERPL-MCNC: 2.7 G/DL (ref 3.5–5)
ALBUMIN/GLOB SERPL: 0.6 {RATIO} (ref 1.1–2.2)
ALP SERPL-CCNC: 108 U/L (ref 45–117)
ALT SERPL-CCNC: 39 U/L (ref 12–78)
ANION GAP SERPL CALC-SCNC: 8 MMOL/L (ref 5–15)
AST SERPL-CCNC: 44 U/L (ref 15–37)
BASOPHILS # BLD: 0 K/UL (ref 0–0.1)
BASOPHILS NFR BLD: 0 % (ref 0–1)
BILIRUB SERPL-MCNC: 0.5 MG/DL (ref 0.2–1)
BUN SERPL-MCNC: 14 MG/DL (ref 6–20)
BUN/CREAT SERPL: 16 (ref 12–20)
CALCIUM SERPL-MCNC: 8.7 MG/DL (ref 8.5–10.1)
CHLORIDE SERPL-SCNC: 104 MMOL/L (ref 97–108)
CO2 SERPL-SCNC: 23 MMOL/L (ref 21–32)
CREAT SERPL-MCNC: 0.9 MG/DL (ref 0.55–1.02)
CRP SERPL-MCNC: 8.93 MG/DL (ref 0–0.6)
D DIMER PPP FEU-MCNC: 0.66 MG/L FEU (ref 0–0.65)
DIFFERENTIAL METHOD BLD: ABNORMAL
EOSINOPHIL # BLD: 0 K/UL (ref 0–0.4)
EOSINOPHIL NFR BLD: 0 % (ref 0–7)
ERYTHROCYTE [DISTWIDTH] IN BLOOD BY AUTOMATED COUNT: 22.1 % (ref 11.5–14.5)
FIBRINOGEN PPP-MCNC: 611 MG/DL (ref 200–475)
GLOBULIN SER CALC-MCNC: 4.5 G/DL (ref 2–4)
GLUCOSE BLD STRIP.AUTO-MCNC: 169 MG/DL (ref 65–100)
GLUCOSE BLD STRIP.AUTO-MCNC: 194 MG/DL (ref 65–100)
GLUCOSE BLD STRIP.AUTO-MCNC: 241 MG/DL (ref 65–100)
GLUCOSE BLD STRIP.AUTO-MCNC: 285 MG/DL (ref 65–100)
GLUCOSE BLD STRIP.AUTO-MCNC: 289 MG/DL (ref 65–100)
GLUCOSE SERPL-MCNC: 170 MG/DL (ref 65–100)
HCT VFR BLD AUTO: 34.8 % (ref 35–47)
HGB BLD-MCNC: 10.3 G/DL (ref 11.5–16)
IMM GRANULOCYTES # BLD AUTO: 0 K/UL (ref 0–0.04)
IMM GRANULOCYTES NFR BLD AUTO: 0 % (ref 0–0.5)
INR PPP: 1 (ref 0.9–1.1)
LYMPHOCYTES # BLD: 2.1 K/UL (ref 0.8–3.5)
LYMPHOCYTES NFR BLD: 23 % (ref 12–49)
MCH RBC QN AUTO: 20.1 PG (ref 26–34)
MCHC RBC AUTO-ENTMCNC: 29.6 G/DL (ref 30–36.5)
MCV RBC AUTO: 68 FL (ref 80–99)
MONOCYTES # BLD: 0.6 K/UL (ref 0–1)
MONOCYTES NFR BLD: 7 % (ref 5–13)
NEUTS BAND NFR BLD MANUAL: 1 %
NEUTS SEG # BLD: 6.5 K/UL (ref 1.8–8)
NEUTS SEG NFR BLD: 69 % (ref 32–75)
NRBC # BLD: 0.02 K/UL (ref 0–0.01)
NRBC BLD-RTO: 0.2 PER 100 WBC
PLATELET # BLD AUTO: 304 K/UL (ref 150–400)
PMV BLD AUTO: 10.2 FL (ref 8.9–12.9)
POTASSIUM SERPL-SCNC: 3.5 MMOL/L (ref 3.5–5.1)
PROT SERPL-MCNC: 7.2 G/DL (ref 6.4–8.2)
PROTHROMBIN TIME: 10.9 SEC (ref 9–11.1)
RBC # BLD AUTO: 5.12 M/UL (ref 3.8–5.2)
RBC MORPH BLD: ABNORMAL
SERVICE CMNT-IMP: ABNORMAL
SODIUM SERPL-SCNC: 135 MMOL/L (ref 136–145)
WBC # BLD AUTO: 9.2 K/UL (ref 3.6–11)

## 2021-01-27 PROCEDURE — 74011000250 HC RX REV CODE- 250: Performed by: HOSPITALIST

## 2021-01-27 PROCEDURE — 85610 PROTHROMBIN TIME: CPT

## 2021-01-27 PROCEDURE — 74011250637 HC RX REV CODE- 250/637: Performed by: NURSE PRACTITIONER

## 2021-01-27 PROCEDURE — 94640 AIRWAY INHALATION TREATMENT: CPT

## 2021-01-27 PROCEDURE — 82962 GLUCOSE BLOOD TEST: CPT

## 2021-01-27 PROCEDURE — 65270000029 HC RM PRIVATE

## 2021-01-27 PROCEDURE — 74011250637 HC RX REV CODE- 250/637: Performed by: STUDENT IN AN ORGANIZED HEALTH CARE EDUCATION/TRAINING PROGRAM

## 2021-01-27 PROCEDURE — 74011250636 HC RX REV CODE- 250/636: Performed by: INTERNAL MEDICINE

## 2021-01-27 PROCEDURE — 74011250636 HC RX REV CODE- 250/636: Performed by: HOSPITALIST

## 2021-01-27 PROCEDURE — 74011636637 HC RX REV CODE- 636/637: Performed by: HOSPITALIST

## 2021-01-27 PROCEDURE — 74011000258 HC RX REV CODE- 258: Performed by: INTERNAL MEDICINE

## 2021-01-27 PROCEDURE — 85379 FIBRIN DEGRADATION QUANT: CPT

## 2021-01-27 PROCEDURE — 94760 N-INVAS EAR/PLS OXIMETRY 1: CPT

## 2021-01-27 PROCEDURE — 74011250637 HC RX REV CODE- 250/637: Performed by: INTERNAL MEDICINE

## 2021-01-27 PROCEDURE — 85025 COMPLETE CBC W/AUTO DIFF WBC: CPT

## 2021-01-27 PROCEDURE — 77010033678 HC OXYGEN DAILY

## 2021-01-27 PROCEDURE — C9113 INJ PANTOPRAZOLE SODIUM, VIA: HCPCS | Performed by: HOSPITALIST

## 2021-01-27 PROCEDURE — 36415 COLL VENOUS BLD VENIPUNCTURE: CPT

## 2021-01-27 PROCEDURE — 74011250636 HC RX REV CODE- 250/636: Performed by: STUDENT IN AN ORGANIZED HEALTH CARE EDUCATION/TRAINING PROGRAM

## 2021-01-27 PROCEDURE — 80053 COMPREHEN METABOLIC PANEL: CPT

## 2021-01-27 PROCEDURE — 86140 C-REACTIVE PROTEIN: CPT

## 2021-01-27 PROCEDURE — 85384 FIBRINOGEN ACTIVITY: CPT

## 2021-01-27 PROCEDURE — 74011250637 HC RX REV CODE- 250/637: Performed by: HOSPITALIST

## 2021-01-27 PROCEDURE — 74011000250 HC RX REV CODE- 250: Performed by: INTERNAL MEDICINE

## 2021-01-27 RX ORDER — ENOXAPARIN SODIUM 100 MG/ML
40 INJECTION SUBCUTANEOUS EVERY 12 HOURS
Status: DISCONTINUED | OUTPATIENT
Start: 2021-01-27 | End: 2021-01-29 | Stop reason: HOSPADM

## 2021-01-27 RX ADMIN — AZITHROMYCIN MONOHYDRATE 500 MG: 500 INJECTION, POWDER, LYOPHILIZED, FOR SOLUTION INTRAVENOUS at 05:14

## 2021-01-27 RX ADMIN — METFORMIN HYDROCHLORIDE 1000 MG: 500 TABLET ORAL at 09:21

## 2021-01-27 RX ADMIN — OXYCODONE HYDROCHLORIDE AND ACETAMINOPHEN 500 MG: 500 TABLET ORAL at 09:21

## 2021-01-27 RX ADMIN — TOPIRAMATE 50 MG: 25 TABLET, FILM COATED ORAL at 09:21

## 2021-01-27 RX ADMIN — TROSPIUM CHLORIDE 20 MG: 20 TABLET, FILM COATED ORAL at 17:32

## 2021-01-27 RX ADMIN — ALBUTEROL SULFATE 2 PUFF: 90 AEROSOL, METERED RESPIRATORY (INHALATION) at 08:55

## 2021-01-27 RX ADMIN — ALBUTEROL SULFATE 2 PUFF: 90 AEROSOL, METERED RESPIRATORY (INHALATION) at 16:09

## 2021-01-27 RX ADMIN — METOPROLOL SUCCINATE 25 MG: 50 TABLET, EXTENDED RELEASE ORAL at 09:21

## 2021-01-27 RX ADMIN — Medication 10 ML: at 09:33

## 2021-01-27 RX ADMIN — INSULIN LISPRO 3 UNITS: 100 INJECTION, SOLUTION INTRAVENOUS; SUBCUTANEOUS at 22:15

## 2021-01-27 RX ADMIN — METFORMIN HYDROCHLORIDE 1000 MG: 500 TABLET ORAL at 17:32

## 2021-01-27 RX ADMIN — BUDESONIDE AND FORMOTEROL FUMARATE DIHYDRATE 2 PUFF: 160; 4.5 AEROSOL RESPIRATORY (INHALATION) at 08:55

## 2021-01-27 RX ADMIN — GUAIFENESIN AND DEXTROMETHORPHAN 5 ML: 100; 10 SYRUP ORAL at 12:59

## 2021-01-27 RX ADMIN — ARIPIPRAZOLE 5 MG: 5 TABLET ORAL at 09:32

## 2021-01-27 RX ADMIN — ENOXAPARIN SODIUM 30 MG: 30 INJECTION SUBCUTANEOUS at 03:42

## 2021-01-27 RX ADMIN — GUAIFENESIN AND DEXTROMETHORPHAN 5 ML: 100; 10 SYRUP ORAL at 09:22

## 2021-01-27 RX ADMIN — Medication 10 ML: at 22:15

## 2021-01-27 RX ADMIN — BUPROPION HYDROCHLORIDE 150 MG: 150 TABLET, EXTENDED RELEASE ORAL at 09:21

## 2021-01-27 RX ADMIN — Medication 10 ML: at 12:59

## 2021-01-27 RX ADMIN — BUDESONIDE AND FORMOTEROL FUMARATE DIHYDRATE 2 PUFF: 160; 4.5 AEROSOL RESPIRATORY (INHALATION) at 21:09

## 2021-01-27 RX ADMIN — CEFTRIAXONE 1 G: 1 INJECTION, POWDER, FOR SOLUTION INTRAMUSCULAR; INTRAVENOUS at 03:43

## 2021-01-27 RX ADMIN — REMDESIVIR 100 MG: 100 INJECTION, POWDER, LYOPHILIZED, FOR SOLUTION INTRAVENOUS at 17:31

## 2021-01-27 RX ADMIN — Medication 10 ML: at 13:02

## 2021-01-27 RX ADMIN — ALBUTEROL SULFATE 2 PUFF: 90 AEROSOL, METERED RESPIRATORY (INHALATION) at 21:08

## 2021-01-27 RX ADMIN — INSULIN LISPRO 2 UNITS: 100 INJECTION, SOLUTION INTRAVENOUS; SUBCUTANEOUS at 07:30

## 2021-01-27 RX ADMIN — INSULIN LISPRO 3 UNITS: 100 INJECTION, SOLUTION INTRAVENOUS; SUBCUTANEOUS at 12:59

## 2021-01-27 RX ADMIN — SODIUM CHLORIDE 40 MG: 9 INJECTION, SOLUTION INTRAMUSCULAR; INTRAVENOUS; SUBCUTANEOUS at 09:23

## 2021-01-27 RX ADMIN — DEXAMETHASONE 6 MG: 4 TABLET ORAL at 09:22

## 2021-01-27 RX ADMIN — Medication 1 CAPSULE: at 09:21

## 2021-01-27 RX ADMIN — ENOXAPARIN SODIUM 40 MG: 40 INJECTION SUBCUTANEOUS at 17:32

## 2021-01-27 RX ADMIN — GUAIFENESIN AND DEXTROMETHORPHAN 5 ML: 100; 10 SYRUP ORAL at 22:15

## 2021-01-27 RX ADMIN — TROSPIUM CHLORIDE 20 MG: 20 TABLET, FILM COATED ORAL at 09:32

## 2021-01-27 RX ADMIN — ATORVASTATIN CALCIUM 20 MG: 20 TABLET, FILM COATED ORAL at 22:16

## 2021-01-27 RX ADMIN — GUAIFENESIN AND DEXTROMETHORPHAN 5 ML: 100; 10 SYRUP ORAL at 17:32

## 2021-01-27 RX ADMIN — INSULIN LISPRO 5 UNITS: 100 INJECTION, SOLUTION INTRAVENOUS; SUBCUTANEOUS at 17:32

## 2021-01-27 NOTE — PROGRESS NOTES
Upon patient standing up and walking around on room air she dropped to 89 percent and her pulse went up to 117 and she felt very SOB she stated

## 2021-01-27 NOTE — PROGRESS NOTES
Pulse oximetry assessment   92% at rest on room air (if 88% or less, skip next steps)  89 % while ambulating on room air  95% at rest on 2LPM  93% while ambulating on 3LPM

## 2021-01-27 NOTE — PROGRESS NOTES
End of Shift Note    Bedside shift change report given to Meghna Lipscomb rn (oncoming nurse) by Chuyita Rhodes (offgoing nurse). Report included the following information SBAR, Kardex, OR Summary, Procedure Summary, Intake/Output, MAR, Accordion and Recent Results    Shift worked:  7p-7a     Shift summary and any significant changes:     aao x4 w/o c/o. No tele. sp02 97-98% on ra while awake. 90-91% when sleeping. Pt ordered cpap but no equipment is currently available. Placed on 2lnc when sleeping as per rrt recommendations. Tolerating po. Concerns for physician to address:  none     Zone phone for oncoming shift:   none       Activity:  Activity Level: Up with Assistance  Number times ambulated in hallways past shift: 0  Number of times OOB to chair past shift: 1    Cardiac:   Cardiac Monitoring: No      Cardiac Rhythm: Normal sinus rhythm    Access:   Current line(s): PIV     Genitourinary:   Urinary status: voiding    Respiratory:   O2 Device: Room air  Chronic home O2 use?: NO  Incentive spirometer at bedside: YES     GI:     Current diet:  DIET DIABETIC WITH OPTIONS Consistent Carb 1800kcal; Mechanical Soft; AHA-LOW-CHOL FAT  Passing flatus: YES  Tolerating current diet: YES       Pain Management:   Patient states pain is manageable on current regimen: YES    Skin:  Sukumar Score: 21  Interventions: float heels and nutritional support     Patient Safety:  Fall Score:  Total Score: 2  Interventions: assistive device (walker, cane, etc), gripper socks, pt to call before getting OOB and stay with me (per policy)       Length of Stay:  Expected LOS: 5d 12h  Actual LOS: 3      Chuyita Rhodes

## 2021-01-27 NOTE — PROGRESS NOTES
Hospitalist Progress Note    NAME: Marisela Riley   :  1965   MRN:  [id number]     I reviewed pertinent labs and imaging, and discussed /agreed on the plan of care with Dr. Junior Haile. Assessment / Plan:  Acute Hypoxic Respiratory Failure with COVID-19 Pneumonia 92% on RA, RR 37  Positive COVID test   COPD Exacerbation   Tachycardia   · CXR  - Bilateral lower lobe airspace opacification   · Continue Azithromycin and Ceftriaxone - Day 3  · Patient still needing 2 L nasal cannula on ambulation, if remains stable then will think about discharging home on nasal cannula. · Mometasone daily. · Start Remdesivir - Day 4, day on   · On Vitamin C and Zinc   · D-dimer 0.66, nearly stable, continue Lovenox. · Continue lovenox   · Afebrile overnight     · Continue inhalers    · Robitussin 5 mg 4 times daily. Angioedema PTA- resolved  Dysphagia   · Angioedema reported by EMS and was given epi and benadryl in route to hospital  · Stopped ACE   · Angioedema now resolved. · Speech therapy evaluated today tolerating diet well. · Recent GI notes from 2020 - unremarkable EGD and suggested esophagogram XR of esophagus will be done outpatient help patient is tolerating diet well. Hypokalemia   · K 3.5, improved  · Follow BMP in AM    Type II Diabetes  Morbid Obesity   Severe protein energy malnutrition  ·  HgbA1c 8.8  · Patient stated she takes Metformin BID - resume   · SSI   · Glucose elevated with steroid    Hypertension   Hyperlipidemia  · Stop Lisinopril with reported angioedema  · Continue metoprolol and atorvastatin   · Monitor        Depression Continue aripiprazole, bupropion  GERD Continue protonix - allergy to H2 Blockers   Tobacco Abuse Stopped smoking when diagnosed with COVID      40 or above Morbid obesity / Body mass index is 42.97 kg/m².     Code status: Full  Prophylaxis: Lovenox  Recommended Disposition: Home w/Family     Subjective:     Chief Complaint / Reason for Physician Visit  Patient seen at bedside, has no complaints. States that her shortness of breath is better, cough is also improved. No fever overnight. Review of Systems:  Symptom Y/N Comments  Symptom Y/N Comments   Fever/Chills n   Chest Pain n    Poor Appetite n   Edema n    Cough y   Abdominal Pain n    Sputum n   Joint Pain n    SOB/BARNES y   Pruritis/Rash n    Nausea/vomit n   Tolerating PT/OT     Diarrhea n   Tolerating Diet y    Constipation n   Other             Objective:     VITALS:   Last 24hrs VS reviewed since prior progress note. Most recent are:  Patient Vitals for the past 24 hrs:   Temp Pulse Resp BP SpO2   01/27/21 1228 98.3 °F (36.8 °C) (!) 107 20 (!) 144/91 98 %   01/27/21 1053  (!) 108   93 %   01/27/21 1052  (!) 114   (!) 89 %   01/27/21 1050  (!) 116   90 %   01/27/21 0920 98.5 °F (36.9 °C) (!) 105 24 139/87 95 %   01/27/21 0855     95 %   01/27/21 0353 98.2 °F (36.8 °C) 98 18 (!) 141/82 94 %   01/26/21 2342 98.3 °F (36.8 °C) 91 18 (!) 140/84 99 %   01/26/21 2042     94 %   01/26/21 1639 98.2 °F (36.8 °C) 89 18 136/71 95 %     No intake or output data in the 24 hours ending 01/27/21 1526     I had a face to face encounter and independently examined this patient on 1/27/2021, as outlined below:  PHYSICAL EXAM:  General: WD, WN. Alert, cooperative, AAF in no acute distress wearing 2 LPM NC   EENT:  EOMI. Anicteric sclerae. MMM  Resp:  LS coarse, no wheezing or rales. No accessory muscle use  CV:  Regular  rhythm,  No edema  GI:  Soft, obese, Non tender. +Bowel sounds  Neurologic:  Alert and oriented X 3, normal speech,   Psych:   Good insight. Not anxious nor agitated  Skin:  No rashes.   No jaundice    Reviewed most current lab test results and cultures  YES  Reviewed most current radiology test results   YES  Review and summation of old records today    NO  Reviewed patient's current orders and MAR    YES  PMH/SH reviewed - no change compared to H&P  ________________________________________________________________________  Care Plan discussed with:    Comments   Patient x    Family      RN x    Care Manager     Consultant                       x Multidiciplinary team rounds were held today with , nursing, pharmacist and clinical coordinator. Patient's plan of care was discussed; medications were reviewed and discharge planning was addressed. ________________________________________________________________________  Total NON critical care TIME:  35   Minutes    Total CRITICAL CARE TIME Spent:   Minutes non procedure based      Comments   >50% of visit spent in counseling and coordination of care x    ________________________________________________________________________  Jose Luis January, MD     Procedures: see electronic medical records for all procedures/Xrays and details which were not copied into this note but were reviewed prior to creation of Plan. LABS:  I reviewed today's most current labs and imaging studies.   Pertinent labs include:  Recent Labs     01/27/21 0358 01/26/21 0651 01/25/21 0332   WBC 9.2 10.2 7.2   HGB 10.3* 10.1* 9.9*   HCT 34.8* 34.0* 32.8*    252 216     Recent Labs     01/27/21 0358 01/26/21 0651 01/25/21 0332   * 136 136   K 3.5 3.4* 3.5    106 105   CO2 23 22 24   * 172* 227*   BUN 14 15 11   CREA 0.90 0.88 0.84   CA 8.7 8.4* 8.7   MG  --   --  2.2   ALB 2.7* 2.6* 2.7*   TBILI 0.5 0.5 0.5   ALT 39 38 44   INR 1.0 1.0 1.0       Signed: Jose Luis Tarango MD

## 2021-01-28 VITALS
SYSTOLIC BLOOD PRESSURE: 134 MMHG | RESPIRATION RATE: 20 BRPM | HEIGHT: 60 IN | DIASTOLIC BLOOD PRESSURE: 75 MMHG | HEART RATE: 88 BPM | TEMPERATURE: 98.1 F | BODY MASS INDEX: 43.19 KG/M2 | WEIGHT: 220 LBS | OXYGEN SATURATION: 99 %

## 2021-01-28 LAB
COMMENT, HOLDF: NORMAL
D DIMER PPP FEU-MCNC: 0.5 MG/L FEU (ref 0–0.65)
FIBRINOGEN PPP-MCNC: 565 MG/DL (ref 200–475)
GLUCOSE BLD STRIP.AUTO-MCNC: 189 MG/DL (ref 65–100)
GLUCOSE BLD STRIP.AUTO-MCNC: 243 MG/DL (ref 65–100)
GLUCOSE BLD STRIP.AUTO-MCNC: 296 MG/DL (ref 65–100)
INR PPP: 1.1 (ref 0.9–1.1)
PROTHROMBIN TIME: 11 SEC (ref 9–11.1)
SAMPLES BEING HELD,HOLD: NORMAL
SERVICE CMNT-IMP: ABNORMAL

## 2021-01-28 PROCEDURE — 74011250636 HC RX REV CODE- 250/636: Performed by: INTERNAL MEDICINE

## 2021-01-28 PROCEDURE — 74011250637 HC RX REV CODE- 250/637: Performed by: INTERNAL MEDICINE

## 2021-01-28 PROCEDURE — 74011250637 HC RX REV CODE- 250/637: Performed by: HOSPITALIST

## 2021-01-28 PROCEDURE — 74011250636 HC RX REV CODE- 250/636: Performed by: HOSPITALIST

## 2021-01-28 PROCEDURE — 74011636637 HC RX REV CODE- 636/637: Performed by: HOSPITALIST

## 2021-01-28 PROCEDURE — 74011250637 HC RX REV CODE- 250/637: Performed by: NURSE PRACTITIONER

## 2021-01-28 PROCEDURE — 85379 FIBRIN DEGRADATION QUANT: CPT

## 2021-01-28 PROCEDURE — 74011250637 HC RX REV CODE- 250/637: Performed by: STUDENT IN AN ORGANIZED HEALTH CARE EDUCATION/TRAINING PROGRAM

## 2021-01-28 PROCEDURE — 36415 COLL VENOUS BLD VENIPUNCTURE: CPT

## 2021-01-28 PROCEDURE — 94640 AIRWAY INHALATION TREATMENT: CPT

## 2021-01-28 PROCEDURE — 85610 PROTHROMBIN TIME: CPT

## 2021-01-28 PROCEDURE — 74011000250 HC RX REV CODE- 250: Performed by: HOSPITALIST

## 2021-01-28 PROCEDURE — 74011250636 HC RX REV CODE- 250/636: Performed by: STUDENT IN AN ORGANIZED HEALTH CARE EDUCATION/TRAINING PROGRAM

## 2021-01-28 PROCEDURE — C9113 INJ PANTOPRAZOLE SODIUM, VIA: HCPCS | Performed by: HOSPITALIST

## 2021-01-28 PROCEDURE — 85384 FIBRINOGEN ACTIVITY: CPT

## 2021-01-28 PROCEDURE — 74011000258 HC RX REV CODE- 258: Performed by: INTERNAL MEDICINE

## 2021-01-28 PROCEDURE — 82962 GLUCOSE BLOOD TEST: CPT

## 2021-01-28 PROCEDURE — 94760 N-INVAS EAR/PLS OXIMETRY 1: CPT

## 2021-01-28 RX ORDER — DEXAMETHASONE 4 MG/1
4 TABLET ORAL
Qty: 4 TAB | Refills: 0 | Status: SHIPPED | OUTPATIENT
Start: 2021-01-28 | End: 2021-02-01

## 2021-01-28 RX ORDER — ASCORBIC ACID 500 MG
500 TABLET ORAL DAILY
Qty: 30 TAB | Refills: 0 | Status: SHIPPED | OUTPATIENT
Start: 2021-01-29 | End: 2021-02-28

## 2021-01-28 RX ORDER — METFORMIN HYDROCHLORIDE 1000 MG/1
1000 TABLET ORAL 2 TIMES DAILY WITH MEALS
Qty: 60 TAB | Refills: 0 | Status: SHIPPED | OUTPATIENT
Start: 2021-01-28 | End: 2021-02-27

## 2021-01-28 RX ADMIN — TOPIRAMATE 50 MG: 25 TABLET, FILM COATED ORAL at 08:03

## 2021-01-28 RX ADMIN — OXYCODONE HYDROCHLORIDE AND ACETAMINOPHEN 500 MG: 500 TABLET ORAL at 08:03

## 2021-01-28 RX ADMIN — ALBUTEROL SULFATE 2 PUFF: 90 AEROSOL, METERED RESPIRATORY (INHALATION) at 20:50

## 2021-01-28 RX ADMIN — BUPROPION HYDROCHLORIDE 150 MG: 150 TABLET, EXTENDED RELEASE ORAL at 08:03

## 2021-01-28 RX ADMIN — Medication 10 ML: at 05:22

## 2021-01-28 RX ADMIN — INSULIN LISPRO 5 UNITS: 100 INJECTION, SOLUTION INTRAVENOUS; SUBCUTANEOUS at 17:37

## 2021-01-28 RX ADMIN — ARIPIPRAZOLE 5 MG: 5 TABLET ORAL at 09:52

## 2021-01-28 RX ADMIN — METFORMIN HYDROCHLORIDE 1000 MG: 500 TABLET ORAL at 17:37

## 2021-01-28 RX ADMIN — METFORMIN HYDROCHLORIDE 1000 MG: 500 TABLET ORAL at 08:03

## 2021-01-28 RX ADMIN — TROSPIUM CHLORIDE 20 MG: 20 TABLET, FILM COATED ORAL at 17:41

## 2021-01-28 RX ADMIN — ALBUTEROL SULFATE 2 PUFF: 90 AEROSOL, METERED RESPIRATORY (INHALATION) at 09:25

## 2021-01-28 RX ADMIN — Medication 10 ML: at 17:37

## 2021-01-28 RX ADMIN — SODIUM CHLORIDE 40 MG: 9 INJECTION, SOLUTION INTRAMUSCULAR; INTRAVENOUS; SUBCUTANEOUS at 08:03

## 2021-01-28 RX ADMIN — GUAIFENESIN AND DEXTROMETHORPHAN 5 ML: 100; 10 SYRUP ORAL at 08:05

## 2021-01-28 RX ADMIN — INSULIN LISPRO 2 UNITS: 100 INJECTION, SOLUTION INTRAVENOUS; SUBCUTANEOUS at 08:03

## 2021-01-28 RX ADMIN — ENOXAPARIN SODIUM 40 MG: 40 INJECTION SUBCUTANEOUS at 05:22

## 2021-01-28 RX ADMIN — ALBUTEROL SULFATE 2 PUFF: 90 AEROSOL, METERED RESPIRATORY (INHALATION) at 12:19

## 2021-01-28 RX ADMIN — GUAIFENESIN AND DEXTROMETHORPHAN 5 ML: 100; 10 SYRUP ORAL at 17:37

## 2021-01-28 RX ADMIN — BUDESONIDE AND FORMOTEROL FUMARATE DIHYDRATE 2 PUFF: 160; 4.5 AEROSOL RESPIRATORY (INHALATION) at 21:00

## 2021-01-28 RX ADMIN — TROSPIUM CHLORIDE 20 MG: 20 TABLET, FILM COATED ORAL at 08:03

## 2021-01-28 RX ADMIN — DEXAMETHASONE 6 MG: 4 TABLET ORAL at 08:03

## 2021-01-28 RX ADMIN — GUAIFENESIN AND DEXTROMETHORPHAN 5 ML: 100; 10 SYRUP ORAL at 12:41

## 2021-01-28 RX ADMIN — ENOXAPARIN SODIUM 40 MG: 40 INJECTION SUBCUTANEOUS at 17:37

## 2021-01-28 RX ADMIN — METOPROLOL SUCCINATE 25 MG: 50 TABLET, EXTENDED RELEASE ORAL at 08:03

## 2021-01-28 RX ADMIN — CEFTRIAXONE 1 G: 1 INJECTION, POWDER, FOR SOLUTION INTRAMUSCULAR; INTRAVENOUS at 05:22

## 2021-01-28 RX ADMIN — INSULIN LISPRO 3 UNITS: 100 INJECTION, SOLUTION INTRAVENOUS; SUBCUTANEOUS at 12:41

## 2021-01-28 RX ADMIN — AZITHROMYCIN MONOHYDRATE 500 MG: 500 INJECTION, POWDER, LYOPHILIZED, FOR SOLUTION INTRAVENOUS at 05:22

## 2021-01-28 RX ADMIN — ALBUTEROL SULFATE 2 PUFF: 90 AEROSOL, METERED RESPIRATORY (INHALATION) at 16:00

## 2021-01-28 RX ADMIN — BUDESONIDE AND FORMOTEROL FUMARATE DIHYDRATE 2 PUFF: 160; 4.5 AEROSOL RESPIRATORY (INHALATION) at 09:00

## 2021-01-28 RX ADMIN — Medication 1 CAPSULE: at 08:03

## 2021-01-28 NOTE — PROGRESS NOTES
Problem: Falls - Risk of  Goal: *Absence of Falls  Description: Document Omer Cea Fall Risk and appropriate interventions in the flowsheet.   Outcome: Progressing Towards Goal  Note: Fall Risk Interventions:  Mobility Interventions: Communicate number of staff needed for ambulation/transfer         Medication Interventions: Patient to call before getting OOB    Elimination Interventions: Call light in reach              Problem: Patient Education: Go to Patient Education Activity  Goal: Patient/Family Education  Outcome: Progressing Towards Goal     Problem: Breathing Pattern - Ineffective  Goal: *Absence of hypoxia  Outcome: Not Progressing Towards Goal

## 2021-01-28 NOTE — PROGRESS NOTES
End of Shift Note    Bedside shift change report given to Rosalie Medeiros RN (oncoming nurse) by Lily Moon (offgoing nurse). Report included the following information SBAR, MAR and Recent Results    Shift worked:  2596-3229     Shift summary and any significant changes:     No changes overnight     Concerns for physician to address:  N/A     Zone phone for oncoming shift:   [phone]       Activity:  Activity Level: Up with Assistance  Number times ambulated in hallways past shift: 0  Number of times OOB to chair past shift: 0    Cardiac:   Cardiac Monitoring: No      Cardiac Rhythm: Normal sinus rhythm    Access:   Current line(s): PIV     Genitourinary:   Urinary status: voiding    Respiratory:   O2 Device: Nasal cannula  Chronic home O2 use?: NO  Incentive spirometer at bedside: YES     GI:     Current diet:  DIET DIABETIC WITH OPTIONS Consistent Carb 1800kcal; Mechanical Soft; AHA-LOW-CHOL FAT  Passing flatus: YES  Tolerating current diet: YES       Pain Management:   Patient states pain is manageable on current regimen: YES    Skin:  Sukumar Score: 21  Interventions: float heels and increase time out of bed    Patient Safety:  Fall Score:  Total Score: 2  Interventions: pt to call before getting OOB       Length of Stay:  Expected LOS: 5d 12h  Actual LOS: [address], RN

## 2021-01-28 NOTE — DISCHARGE INSTRUCTIONS
HOSPITALIST DISCHARGE INSTRUCTIONS    NAME: Yuniel Teague   :  1965   MRN:  [id number]     Date/Time:  2021 10:21 AM    ADMIT DATE: 2021   DISCHARGE DATE: 2021     Attending Physician: Jose Luis Tarango MD    DISCHARGE DIAGNOSIS:  Acute Hypoxic Respiratory Failure with COVID-19 Pneumonia 92% on RA, RR 37  Positive COVID test   COPD Exacerbation   Tachycardia   Angioedema PTA- resolved  Dysphagia   Hypokalemia  Type II Diabetes  Morbid Obesity   Severe protein energy malnutrition  Hypertension   Hyperlipidemia  Depression  GERD  Tobacco abuse    MEDICATIONS:  See above    · It is important that you take the medication exactly as they are prescribed. · Keep your medication in the bottles provided by the pharmacist and keep a list of the medication names, dosages, and times to be taken in your wallet. · Do not take other medications without consulting your doctor. Pain Management: per above medications    What to do at [address]:  Cardiac Diet    Recommended activity: Activity as tolerated    If you have questions regarding the hospital related prescriptions or hospital related issues please call [facility] at . You can always direct your questions to your primary care doctor if you are unable to reach your hospital physician; your PCP works as an extension of your hospital doctor just like your hospital doctor is an extension of your PCP for your time at [facility]. If you experience any of the following symptoms then please call your primary care physician or return to the emergency room if you cannot get hold of your doctor:  Fever, chills, nausea, vomiting, diarrhea, change in mentation, falling, bleeding, shortness of breath    Additional Instructions:  Please finish the course of steroids.    Please get your labs- BMP checked in 1 week after discharge from the hospital.     Bring these papers with you to your follow up appointments. The papers will help your doctors be sure to continue the care plan from the hospital.              Information obtained by :  I understand that if any problems occur once I am at home I am to contact my physician. I understand and acknowledge receipt of the instructions indicated above.                                                                                                                                            Physician's or R.N.'s Signature                                                                  Date/Time                                                                                                                                              Patient or Representative Signature                                                          Da

## 2021-01-28 NOTE — DISCHARGE SUMMARY
Hospitalist Discharge Summary     Patient ID:  Latanya Araya  [id number]  71 y.o.  1965 1/24/2021    PCP on record: Christy Real MD    Admit date: 1/24/2021  Discharge date and time: 1/28/2021    DISCHARGE DIAGNOSIS:    Acute Hypoxic Respiratory Failure with COVID-19 Pneumonia 92% on RA, RR 37  Positive COVID test 1/18  COPD Exacerbation   Tachycardia   Angioedema PTA- resolved  Dysphagia   Hypokalemia  Type II Diabetes  Morbid Obesity   Severe protein energy malnutrition  Hypertension   Hyperlipidemia  Depression  GERD  Tobacco abuse      CONSULTATIONS:  None    Excerpted HPI from H&P of Elina Reyes MD:    Latanya Araya is a 54 y.o. female  With h/o copd, tobacco abuse, depression  , recent covid positive  1/18 , not on home oxygen and  gastritis, who presents with acute sob since last night associatted with feeling of difficulty in swallowing  Per ems she was c/o of associatted dysphagia and lip swelling ? So they rxed her with epi im/benadryl  for possible angioedema. On arrival to ed pt. Does not have lip swelling. To me she satated that she  Still feeling difficulty in swallowing. Currently is tachypnic and increase work of breathing.  ______________________________________________________________________  DISCHARGE SUMMARY/HOSPITAL COURSE:  for full details see H&P, daily progress notes, labs, consult notes. 54-year-old -American female was admitted to the hospital for the management of the acute hypoxic respiratory failure with COVID-19 pneumonia with COPD exacerbation. Patient also got angioedema and was given steroid. Angioedema resolved with the steroids, for COVID-19 pneumonia patient was given remdesivir along with dexamethasone. Empiric antibiotics was also given. Patient hypoxic respiratory failure resolved with the above interventions, insulin was given for diabetes with hyperglycemia.   Home medication was continued for the rest of the comorbidities. Tachycardia also resolved with the resolution of the hypoxic respiratory failure. Patient was initially on oxygen supplementation and was able to successfully wean to 1 to 2 L nasal cannula. Patient discharged home with home oxygen with advised to follow-up with primary care doctor.    _______________________________________________________________________  Patient seen and examined by me on discharge day. Pertinent Findings:  Gen:    Not in distress  Chest: Clear lungs  CVS:   Regular rhythm. No edema  Abd:  Soft, not distended, not tender  Neuro:  Alert,   _______________________________________________________________________  DISCHARGE MEDICATIONS:   Current Discharge Medication List      START taking these medications    Details   ascorbic acid, vitamin C, (VITAMIN C) 500 mg tablet Take 1 Tab by mouth daily for 30 days. Qty: 30 Tab, Refills: 0      metFORMIN (GLUCOPHAGE) 1,000 mg tablet Take 1 Tab by mouth two (2) times daily (with meals) for 30 days. Qty: 60 Tab, Refills: 0      dexAMETHasone (DECADRON) 4 mg tablet Take 4 mg by mouth Daily (before breakfast) for 4 days. Qty: 4 Tab, Refills: 0         CONTINUE these medications which have NOT CHANGED    Details   ARIPiprazole (Abilify) 5 mg tablet Take 5 mg by mouth daily. omeprazole (PRILOSEC) 40 mg capsule Take 40 mg by mouth daily. topiramate (TOPAMAX) 50 mg tablet Take 50 mg by mouth nightly. phentermine 37.5 mg capsule take 1 capsule by mouth every morning      lisinopril (PRINIVIL, ZESTRIL) 10 mg tablet Take 10 mg by mouth daily. ADVAIR DISKUS 250-50 mcg/dose diskus inhaler Take 1 Puff by inhalation two (2) times a day. buPROPion XL (WELLBUTRIN XL) 150 mg tablet Take 150 mg by mouth daily. cyclobenzaprine (FLEXERIL) 10 mg tablet Take 1 Tab by mouth three (3) times daily as needed for Muscle Spasm(s). Qty: 15 Tab, Refills: 0      mirabegron ER (MYRBETRIQ) 25 mg ER tablet Take 25 mg by mouth daily. metoprolol succinate (TOPROL-XL) 25 mg XL tablet Take 1 Tab by mouth daily. Qty: 30 Tab, Refills: 3    Associated Diagnoses: Hypertrophic cardiomyopathy (HCC)      atorvastatin (LIPITOR) 20 mg tablet  20 mg = 1 tab each dose, PO, bedtime, 0 Refills      albuterol (PROVENTIL HFA, VENTOLIN HFA) 90 mcg/actuation inhaler Take 2 Puffs by inhalation every four (4) hours as needed. Patient Follow Up Instructions: Activity: Activity as tolerated  Diet: Diabetic Diet  Wound Care: None needed    Please finish the course of steroids. Please get your labs- BMP checked in 1 week after discharge from the hospital.     If you have questions regarding the hospital related prescriptions or hospital related issues please call [phone] [facility] at . You can always direct your questions to your primary care doctor if you are unable to reach your hospital physician; your PCP works as an extension of your hospital doctor just like your hospital doctor is an extension of your PCP for your time at [address] [facility].     If you experience any of the following symptoms then please call your primary care physician or return to the emergency room if you cannot get hold of your doctor:  Fever, chills, nausea, vomiting, diarrhea, change in mentation, falling, bleeding, shortness of breath    Follow-up Information     Follow up With Specialties Details Why Contact Kierra Schafer, [email or web address]   [address] [place] [address] [address]  [phone]          ________________________________________________________________    Risk of deterioration: Moderate    Condition at Discharge:  Stable  __________________________________________________________________    Disposition  Home with family and home health services    ____________________________________________________________________    Code Status: Full Code  ___________________________________________________________________      Total time in minutes spent coordinating this discharge (includes going over instructions, follow-up, prescriptions, and preparing report for sign off to her PCP) :  >30 minutes    Signed:  Shruthi Rhodes MD

## 2021-01-28 NOTE — PROGRESS NOTES
Transition of Care  · Home   · Virtual PCP follow-up   · Home O2: [facility]  · Pt's daughter to provide transportation at discharge    Update 8:20pm: Nursing confirmed that O2 tanks had been delivered to Pt's home and daughter would be bringing them to transport Pt home. 6pm: CM contacted [facility] to follow-up on status of order. representative confirmed that they recieved the medical necessity letter and a  would be delivering O2 this evening. Representative was unable to provide an ETA     5:43pm: Medical necessity letter signed and faxed to [facility]. 4:16pm: CM received medical necessity letter and notified attending. 4pm: CM was informed that Pt has been approved and company would be sending medical necessity form for attending's completion. 12:05pm: CM notified by [employer] that they would not be able to service Pt for home O2 at this time. CM sent referral to [address] for consideration. CM was informed that company would need to verify insurance information. 11: 45am: CM sent O2 orders to [employer] [facility] for review. 10:15am: CM notified of Pt discharging today with home O2 needs.        Juanpablo Ornelas, VIDA  [facility] Nadya

## 2021-01-28 NOTE — PROGRESS NOTES
Hospital follow-up Telemedicine PCP transitional care appointment has been scheduled with Dr. Matthew Torres for Monday, 2/1/21 at 4:00 p.m. Pending patient discharge.   [place] Good, Care Management Specialist.

## 2021-01-28 NOTE — PROGRESS NOTES
Pulse oximetry assessment   93% at rest on room air (if 88% or less, skip next steps)  88% while ambulating on room air  94% at rest on 2LPM after walking around  93% while ambulating on 2LPM

## 2021-01-28 NOTE — PROGRESS NOTES
End of Shift Note    Bedside shift change report given to Lurdes Shah RN (oncoming nurse) by Elisa Sotelo (offgoing nurse). Report included the following information SBAR, Kardex, Intake/Output, MAR, Recent Results and Med Rec Status    Shift worked:  0700 - 1930     Shift summary and any significant changes:     none     Concerns for physician to address:  none     Zone phone for oncoming shift:   [phone]       Activity:  Activity Level: Up with Assistance  Number times ambulated in hallways past shift: 0  Number of times OOB to chair past shift: 0    Cardiac:   Cardiac Monitoring: No      Cardiac Rhythm: Normal sinus rhythm    Access:   Current line(s): PIV     Genitourinary:   Urinary status: voiding    Respiratory:   O2 Device: Room air  Chronic home O2 use?: NO  Incentive spirometer at bedside: YES     GI:     Current diet:  DIET DIABETIC WITH OPTIONS Consistent Carb 1800kcal; Mechanical Soft; AHA-LOW-CHOL FAT  Passing flatus: YES  Tolerating current diet: YES       Pain Management:   Patient states pain is manageable on current regimen: YES    Skin:  Sukumar Score: 21  Interventions: increase time out of bed    Patient Safety:  Fall Score:  Total Score: 2  Interventions: pt to call before getting OOB       Length of Stay:  Expected LOS: 5d 12h  Actual LOS: [address]

## 2021-01-28 NOTE — PROGRESS NOTES
End of Shift Note    Bedside shift change report given to Alysa Holm RN (oncoming nurse) by Dione Blair (offgoing nurse). Report included the following information SBAR, Kardex, Intake/Output, MAR, Recent Results and Med Rec Status    Shift worked:  0700 - 1930     Shift summary and any significant changes:    none     Concerns for physician to address:  none     Zone phone for oncoming shift:   [phone]       Activity:  Activity Level: Up with Assistance  Number times ambulated in hallways past shift: 0  Number of times OOB to chair past shift: 0    Cardiac:   Cardiac Monitoring: No      Cardiac Rhythm: Normal sinus rhythm    Access:   Current line(s): PIV     Genitourinary:   Urinary status: voiding    Respiratory:   O2 Device: Nasal cannula  Chronic home O2 use?: NO  Incentive spirometer at bedside: NO     GI:     Current diet:  DIET DIABETIC WITH OPTIONS Consistent Carb 1800kcal; Mechanical Soft; AHA-LOW-CHOL FAT  Passing flatus: YES  Tolerating current diet: YES       Pain Management:   Patient states pain is manageable on current regimen: YES    Skin:  Sukumar Score: 21  Interventions: increase time out of bed    Patient Safety:  Fall Score:  Total Score: 2  Interventions: pt to call before getting OOB       Length of Stay:  Expected LOS: 5d 12h  Actual LOS: [address]

## 2021-01-29 NOTE — PROGRESS NOTES
1900 Verbal shift change report given to Kaya Langford (oncoming nurse) by Genesis Humphrey (offgoing nurse). Report included the following information SBAR, Kardex, Intake/Output and Recent Results.      2025: discussed with care management and pt that O2 has been delivered to pt's house, pt states daughter will bring pt portable O2 to hospital when picking pt up    2120: pt given discharge instructions, verbalizes understanding, denies questions, IV removed

## 2021-02-22 ENCOUNTER — HOSPITAL ENCOUNTER (EMERGENCY)
Age: 56
Discharge: HOME | End: 2021-02-22
Attending: EMERGENCY MEDICINE
Payer: MEDICARE

## 2021-02-22 VITALS
HEIGHT: 60 IN | TEMPERATURE: 98.3 F | RESPIRATION RATE: 18 BRPM | DIASTOLIC BLOOD PRESSURE: 72 MMHG | WEIGHT: 220 LBS | HEART RATE: 99 BPM | SYSTOLIC BLOOD PRESSURE: 114 MMHG | BODY MASS INDEX: 43.19 KG/M2 | OXYGEN SATURATION: 99 %

## 2021-02-22 DIAGNOSIS — R73.9 HYPERGLYCEMIA: Primary | ICD-10-CM

## 2021-02-22 LAB
ANION GAP SERPL CALC-SCNC: 13 MMOL/L (ref 5–15)
APPEARANCE UR: CLEAR
BACTERIA URNS QL MICRO: ABNORMAL /HPF
BASOPHILS # BLD: 0 K/UL (ref 0–0.1)
BASOPHILS NFR BLD: 0 % (ref 0–1)
BILIRUB UR QL: NEGATIVE
BUN SERPL-MCNC: 5 MG/DL (ref 6–20)
BUN/CREAT SERPL: 5 (ref 12–20)
CALCIUM SERPL-MCNC: 8.8 MG/DL (ref 8.5–10.1)
CHLORIDE SERPL-SCNC: 102 MMOL/L (ref 97–108)
CO2 SERPL-SCNC: 23 MMOL/L (ref 21–32)
COLOR UR: ABNORMAL
CREAT SERPL-MCNC: 1.08 MG/DL (ref 0.55–1.02)
DIFFERENTIAL METHOD BLD: ABNORMAL
EOSINOPHIL # BLD: 0.1 K/UL (ref 0–0.4)
EOSINOPHIL NFR BLD: 2 % (ref 0–7)
EPITH CASTS URNS QL MICRO: ABNORMAL /LPF
ERYTHROCYTE [DISTWIDTH] IN BLOOD BY AUTOMATED COUNT: 21.3 % (ref 11.5–14.5)
GLUCOSE BLD STRIP.AUTO-MCNC: 252 MG/DL (ref 65–100)
GLUCOSE BLD STRIP.AUTO-MCNC: 337 MG/DL (ref 65–100)
GLUCOSE SERPL-MCNC: 321 MG/DL (ref 65–100)
GLUCOSE UR STRIP.AUTO-MCNC: >1000 MG/DL
HCT VFR BLD AUTO: 35.9 % (ref 35–47)
HGB BLD-MCNC: 11.2 G/DL (ref 11.5–16)
HGB UR QL STRIP: NEGATIVE
IMM GRANULOCYTES # BLD AUTO: 0 K/UL (ref 0–0.04)
IMM GRANULOCYTES NFR BLD AUTO: 0 % (ref 0–0.5)
KETONES UR QL STRIP.AUTO: NEGATIVE MG/DL
LEUKOCYTE ESTERASE UR QL STRIP.AUTO: NEGATIVE
LYMPHOCYTES # BLD: 2.4 K/UL (ref 0.8–3.5)
LYMPHOCYTES NFR BLD: 43 % (ref 12–49)
MCH RBC QN AUTO: 22.2 PG (ref 26–34)
MCHC RBC AUTO-ENTMCNC: 31.2 G/DL (ref 30–36.5)
MCV RBC AUTO: 71.1 FL (ref 80–99)
MONOCYTES # BLD: 0.3 K/UL (ref 0–1)
MONOCYTES NFR BLD: 6 % (ref 5–13)
NEUTS SEG # BLD: 2.7 K/UL (ref 1.8–8)
NEUTS SEG NFR BLD: 49 % (ref 32–75)
NITRITE UR QL STRIP.AUTO: NEGATIVE
NRBC # BLD: 0 K/UL (ref 0–0.01)
NRBC BLD-RTO: 0 PER 100 WBC
PH UR STRIP: 6.5 [PH] (ref 5–8)
PLATELET # BLD AUTO: 314 K/UL (ref 150–400)
PMV BLD AUTO: 10.6 FL (ref 8.9–12.9)
POTASSIUM SERPL-SCNC: 3.9 MMOL/L (ref 3.5–5.1)
PROT UR STRIP-MCNC: NEGATIVE MG/DL
RBC # BLD AUTO: 5.05 M/UL (ref 3.8–5.2)
RBC #/AREA URNS HPF: ABNORMAL /HPF (ref 0–5)
RBC MORPH BLD: ABNORMAL
RBC MORPH BLD: ABNORMAL
SERVICE CMNT-IMP: ABNORMAL
SERVICE CMNT-IMP: ABNORMAL
SODIUM SERPL-SCNC: 138 MMOL/L (ref 136–145)
SP GR UR REFRACTOMETRY: 1.02 (ref 1–1.03)
UA: UC IF INDICATED,UAUC: ABNORMAL
UROBILINOGEN UR QL STRIP.AUTO: 1 EU/DL (ref 0.2–1)
WBC # BLD AUTO: 5.5 K/UL (ref 3.6–11)
WBC URNS QL MICRO: ABNORMAL /HPF (ref 0–4)

## 2021-02-22 PROCEDURE — 96374 THER/PROPH/DIAG INJ IV PUSH: CPT

## 2021-02-22 PROCEDURE — 82962 GLUCOSE BLOOD TEST: CPT

## 2021-02-22 PROCEDURE — 96361 HYDRATE IV INFUSION ADD-ON: CPT

## 2021-02-22 PROCEDURE — 36415 COLL VENOUS BLD VENIPUNCTURE: CPT

## 2021-02-22 PROCEDURE — 74011250636 HC RX REV CODE- 250/636: Performed by: EMERGENCY MEDICINE

## 2021-02-22 PROCEDURE — 80048 BASIC METABOLIC PNL TOTAL CA: CPT

## 2021-02-22 PROCEDURE — 99284 EMERGENCY DEPT VISIT MOD MDM: CPT

## 2021-02-22 PROCEDURE — 85025 COMPLETE CBC W/AUTO DIFF WBC: CPT

## 2021-02-22 PROCEDURE — 81001 URINALYSIS AUTO W/SCOPE: CPT

## 2021-02-22 PROCEDURE — 74011636637 HC RX REV CODE- 636/637: Performed by: EMERGENCY MEDICINE

## 2021-02-22 PROCEDURE — 96376 TX/PRO/DX INJ SAME DRUG ADON: CPT

## 2021-02-22 RX ORDER — SODIUM CHLORIDE 0.9 % (FLUSH) 0.9 %
5-40 SYRINGE (ML) INJECTION EVERY 8 HOURS
Status: DISCONTINUED | OUTPATIENT
Start: 2021-02-22 | End: 2021-02-23 | Stop reason: HOSPADM

## 2021-02-22 RX ADMIN — HUMAN INSULIN 5 UNITS: 100 INJECTION, SOLUTION SUBCUTANEOUS at 22:02

## 2021-02-22 RX ADMIN — SODIUM CHLORIDE 1000 ML: 9 INJECTION, SOLUTION INTRAVENOUS at 20:37

## 2021-02-22 RX ADMIN — HUMAN INSULIN 10 UNITS: 100 INJECTION, SOLUTION SUBCUTANEOUS at 20:37

## 2021-02-22 RX ADMIN — SODIUM CHLORIDE 1000 ML: 9 INJECTION, SOLUTION INTRAVENOUS at 22:02

## 2021-02-23 NOTE — ED NOTES
Patient presents to the ED ambulatory with c/o hyperglycemia since 2/19. Pt stated she normally takes metformin and went to her PCP and was told her glucose was in the 600's. Stated her PCP started her on lantis 30 units. Stated she has been taking it as prescribed but it is still in the 400s. Stated she called her PCP today and told her it was still elevated; pcp told her to increase her lantis to 40 units and go to the ER. Pt stated she is having blurry vision and nausea. Denies vomiting. Reports a decreased appetite. Pt is alert, oriented and appropriate. Ambulatory on arrival.       Emergency Department Nursing Plan of Care       The Nursing Plan of Care is developed from the Nursing assessment and Emergency Department Attending provider initial evaluation. The plan of care may be reviewed in the ED Provider note.     The Plan of Care was developed with the following considerations:   Patient / Family readiness to learn indicated by:verbalized understanding  Persons(s) to be included in education: patient  Barriers to Learning/Limitations:No    Signed     Sherry Limb    2/22/2021   9:10 PM

## 2021-02-23 NOTE — ED NOTES
Patient  given copy of dc instructions and 0 script(s). Patient  verbalized understanding of instructions and script (s). Patient given a current medication reconciliation form and verbalized understanding of their medications. Patients verbalized understanding of the importance of discussing medications with  his or her physician or clinic they will be following up with. Patient alert and oriented and in no acute distress. Patient discharged home ambulatory.

## 2021-02-23 NOTE — ED TRIAGE NOTES
Glucose of 512 this morning. Has taken meds today but has not taken insulin this evening. Reports blurry vision.

## 2021-02-23 NOTE — ED PROVIDER NOTES
EMERGENCY DEPARTMENT HISTORY AND PHYSICAL EXAM      Date: 2/22/2021  Patient Name: Avelino Hubbard    History of Presenting Illness     Chief Complaint   Patient presents with    High Blood Sugar       History Provided By: Patient    HPI: 46 yo F with h/o DM referred by her PCP for ~one week of uncontrolled hyperglycemia despite close primary care supervision and medication adherence. Pt reports fluctuating blood sugars >300 despite increasing her lantis. She has some nausea, lightheaded dizziness, and generalized weakness. Denies polydipsia, or polyuria. No aggravating/alleviating factors. There are no other complaints, changes, or physical findings at this time. PCP: Antonio Easley MD    No current facility-administered medications on file prior to encounter. Current Outpatient Medications on File Prior to Encounter   Medication Sig Dispense Refill    ascorbic acid, vitamin C, (VITAMIN C) 500 mg tablet Take 1 Tab by mouth daily for 30 days. 30 Tab 0    metFORMIN (GLUCOPHAGE) 1,000 mg tablet Take 1 Tab by mouth two (2) times daily (with meals) for 30 days. 60 Tab 0    ARIPiprazole (Abilify) 5 mg tablet Take 5 mg by mouth daily.  omeprazole (PRILOSEC) 40 mg capsule Take 40 mg by mouth daily.  topiramate (TOPAMAX) 50 mg tablet Take 50 mg by mouth nightly.  phentermine 37.5 mg capsule take 1 capsule by mouth every morning      lisinopril (PRINIVIL, ZESTRIL) 10 mg tablet Take 10 mg by mouth daily.  ADVAIR DISKUS 250-50 mcg/dose diskus inhaler Take 1 Puff by inhalation two (2) times a day.  buPROPion XL (WELLBUTRIN XL) 150 mg tablet Take 150 mg by mouth daily.  cyclobenzaprine (FLEXERIL) 10 mg tablet Take 1 Tab by mouth three (3) times daily as needed for Muscle Spasm(s). 15 Tab 0    mirabegron ER (MYRBETRIQ) 25 mg ER tablet Take 25 mg by mouth daily.  metoprolol succinate (TOPROL-XL) 25 mg XL tablet Take 1 Tab by mouth daily.  30 Tab 3    atorvastatin (LIPITOR) 20 mg tablet  20 mg = 1 tab each dose, PO, bedtime, 0 Refills      albuterol (PROVENTIL HFA, VENTOLIN HFA) 90 mcg/actuation inhaler Take 2 Puffs by inhalation every four (4) hours as needed. Past History     Past Medical History:  Past Medical History:   Diagnosis Date    Chronic pelvic pain in female 2014    Depression     Diabetes ([facility]     Hypertension     Obesity (BMI 35.0-39.9 without comorbidity)     SHAGUFTA on CPAP 3/9/2017    Posttraumatic stress disorder     Psychiatric disorder     depression    Recurrent major depression ([facility] 2010    S/P ventral herniorrhaphy 2018    Suicidal thoughts     Thyroid nodule 10/1/2015    Unspecified sleep apnea     uses cpap       Past Surgical History:  Past Surgical History:   Procedure Laterality Date    ABDOMEN SURGERY PROC UNLISTED      cholecystectomy    HX  SECTION      HX  SECTION      HX HEENT      head and neck surgery     HX HERNIA REPAIR  2018    incarcerated ventral hernia repair with mesh    HX HYSTERECTOMY      HX ORTHOPAEDIC      L ankle surgery     HX ORTHOPAEDIC Left     TOTAL HIP REPLACEMENT    HX TUBAL LIGATION      PPBTL       Family History:  Family History   Problem Relation Age of Onset    Cancer Mother 61        Breast cancer   [place] Breast Cancer Mother 48    Heart Disease Father 50        M. I.   [place] Asthma Sister     Heart Disease Brother     Hypertension Brother     Hypertension Brother     Diabetes Brother     Lung Disease Brother         COPD       Social History:  Social History     Tobacco Use    Smoking status: Current Every Day Smoker     Packs/day: 2.00     Years: 30.00     Pack years: 60.00     Types: Cigarettes     Last attempt to quit: 2019     Years since quittin.5    Smokeless tobacco: Never Used   Substance Use Topics    Alcohol use: Never     Frequency: Never     Binge frequency: Never    Drug use: Never       Allergies:   Allergies Allergen Reactions    Zantac [Ranitidine Hcl] Hives    Zantac [Ranitidine Hcl] Hives    Lisinopril Angioedema         Review of Systems   Review of Systems   Constitutional: Negative for chills, fatigue and fever. HENT: Negative. Negative for sore throat. Eyes: Negative. Respiratory: Negative for cough and shortness of breath. Cardiovascular: Negative for chest pain and palpitations. Gastrointestinal: Positive for nausea. Negative for abdominal pain and vomiting. Genitourinary: Negative for dysuria. Musculoskeletal: Negative. Skin: Negative. Neurological: Positive for light-headedness. Negative for dizziness and headaches. Psychiatric/Behavioral: Negative. All other systems reviewed and are negative. Physical Exam   Physical Exam  Vitals signs and nursing note reviewed. Constitutional:       General: She is not in acute distress. Appearance: She is not toxic-appearing. HENT:      Head: Normocephalic and atraumatic. Mouth/Throat:      Mouth: Mucous membranes are moist.   Eyes:      Pupils: Pupils are equal, round, and reactive to light. Neck:      Musculoskeletal: Normal range of motion. Cardiovascular:      Rate and Rhythm: Normal rate and regular rhythm. Pulses: Normal pulses. Pulmonary:      Effort: Pulmonary effort is normal. No respiratory distress. Breath sounds: Normal breath sounds. Abdominal:      General: There is no distension. Tenderness: There is no abdominal tenderness. Musculoskeletal: Normal range of motion. General: No tenderness. Skin:     General: Skin is warm. Findings: No rash. Neurological:      Mental Status: She is alert and oriented to person, place, and time. Sensory: No sensory deficit.          Diagnostic Study Results     Labs -     Recent Results (from the past 12 hour(s))   GLUCOSE, POC    Collection Time: 02/22/21  8:16 PM   Result Value Ref Range    Glucose (POC) 337 (H) 65 - 100 mg/dL Performed by Lashon Barriga    CBC WITH AUTOMATED DIFF    Collection Time: 02/22/21  8:25 PM   Result Value Ref Range    WBC 5.5 3.6 - 11.0 K/uL    RBC 5.05 3.80 - 5.20 M/uL    HGB 11.2 (L) 11.5 - 16.0 g/dL    HCT 35.9 35.0 - 47.0 %    MCV 71.1 (L) 80.0 - 99.0 FL    MCH 22.2 (L) 26.0 - 34.0 PG    MCHC 31.2 30.0 - 36.5 g/dL    RDW 21.3 (H) 11.5 - 14.5 %    PLATELET 403 503 - 365 K/uL    MPV 10.6 8.9 - 12.9 FL    NRBC 0.0 0  WBC    ABSOLUTE NRBC 0.00 0.00 - 0.01 K/uL    NEUTROPHILS 49 32 - 75 %    LYMPHOCYTES 43 12 - 49 %    MONOCYTES 6 5 - 13 %    EOSINOPHILS 2 0 - 7 %    BASOPHILS 0 0 - 1 %    IMMATURE GRANULOCYTES 0 0.0 - 0.5 %    ABS. NEUTROPHILS 2.7 1.8 - 8.0 K/UL    ABS. LYMPHOCYTES 2.4 0.8 - 3.5 K/UL    ABS. MONOCYTES 0.3 0.0 - 1.0 K/UL    ABS. EOSINOPHILS 0.1 0.0 - 0.4 K/UL    ABS. BASOPHILS 0.0 0.0 - 0.1 K/UL    ABS. IMM.  GRANS. 0.0 0.00 - 0.04 K/UL    DF SMEAR SCANNED      RBC COMMENTS ANISOCYTOSIS  2+        RBC COMMENTS POIKILOCYTOSIS  1+       METABOLIC PANEL, BASIC    Collection Time: 02/22/21  8:25 PM   Result Value Ref Range    Sodium 138 136 - 145 mmol/L    Potassium 3.9 3.5 - 5.1 mmol/L    Chloride 102 97 - 108 mmol/L    CO2 23 21 - 32 mmol/L    Anion gap 13 5 - 15 mmol/L    Glucose 321 (H) 65 - 100 mg/dL    BUN 5 (L) 6 - 20 MG/DL    Creatinine 1.08 (H) 0.55 - 1.02 MG/DL    BUN/Creatinine ratio 5 (L) 12 - 20      GFR est AA >60 >60 ml/min/1.73m2    GFR est non-AA 53 (L) >60 ml/min/1.73m2    Calcium 8.8 8.5 - 10.1 MG/DL   URINALYSIS W/ REFLEX CULTURE    Collection Time: 02/22/21  8:32 PM    Specimen: Urine   Result Value Ref Range    Color YELLOW/STRAW      Appearance CLEAR CLEAR      Specific gravity 1.020 1.003 - 1.030      pH (UA) 6.5 5.0 - 8.0      Protein Negative NEG mg/dL    Glucose >1,000 (A) NEG mg/dL    Ketone Negative NEG mg/dL    Bilirubin Negative NEG      Blood Negative NEG      Urobilinogen 1.0 0.2 - 1.0 EU/dL    Nitrites Negative NEG      Leukocyte Esterase Negative NEG WBC 5-10 0 - 4 /hpf    RBC 0-5 0 - 5 /hpf    Epithelial cells FEW FEW /lpf    Bacteria 1+ (A) NEG /hpf    UA:UC IF INDICATED CULTURE NOT INDICATED BY UA RESULT CNI         Radiologic Studies -   No orders to display     CT Results  (Last 48 hours)    None        CXR Results  (Last 48 hours)    None          Medical Decision Making   I am the first provider for this patient. I reviewed the vital signs, available nursing notes, past medical history, past surgical history, family history and social history. Vital Signs-Reviewed the patient's vital signs. Patient Vitals for the past 12 hrs:   Temp Pulse Resp BP SpO2   02/22/21 2008 98.3 °F (36.8 °C) 99 18 125/76 100 %         Records Reviewed: Nursing Notes    Provider Notes (Medical Decision Making):   DDx: uncontrolled hyperglycemia, DKA, HHS, dehydration    ED Course:   Initial assessment performed. The patients presenting problems have been discussed, and they are in agreement with the care plan formulated and outlined with them. I have encouraged them to ask questions as they arise throughout their visit. CRITICAL CARE NOTE :    10:25 PM      IMPENDING DETERIORATION -Metabolic and Renal    ASSOCIATED RISK FACTORS - Metabolic changes and Dehydration    MANAGEMENT- Bedside Assessment and Supervision of Care    INTERPRETATION -  Blood Pressure    INTERVENTIONS - hemodynamic mngmt and Metobolic interventions    CASE REVIEW - Nursing and Family    TREATMENT RESPONSE -Stable    PERFORMED BY - Self        NOTES   :      I have spent 60 minutes of critical care time involved in lab review, consultations with specialist, family decision- making, bedside attention and documentation. During this entire length of time I was immediately available to the patient . Oumar Rivers MD    10:33 PM  Pt is feeling better. Sugar has improved and vitals remained stable. Discussed need for PCP f/u ASAP and return precautions.         Disposition:  DISCHARGE  10:32 PM  The patient has been re-evaluated and is ready for discharge. Reviewed available results with patient. Counseled pt on diagnosis and care plan. Pt has expressed understanding, and all questions have been answered. Pt agrees with plan and agrees to follow up as recommended, or return to the ED if their symptoms worsen. Discharge instructions have been provided and explained to the pt, along with reasons to return to the ED. DISCHARGE PLAN:  1. Current Discharge Medication List        2. Follow-up Information    None       3. Return to ED if worse     Diagnosis     Clinical Impression: No diagnosis found. Attestations: This note is prepared by [facility], acting as Scribe for Madhuri Fields MD.     Madhuri Fields MD. The scribe's documentation has been prepared under my direction and personally reviewed by me in its entirety. I confirm that the note above accurately reflects all work, treatment, procedures and medical decision making performed by me.

## 2021-04-07 NOTE — PROGRESS NOTES
Hospitalist Progress Note    NAME: Mirella Jiang   :  1965   MRN:  [id number]     I reviewed pertinent labs and imaging, and discussed /agreed on the plan of care with Dr. Rigoberto Black. Assessment / Plan:  Acute Hypoxic Respiratory Failure with COVID-19 Pneumonia 92% on RA, RR 37  Positive COVID test   COPD Exacerbation   Tachycardia   · CXR  - Bilateral lower lobe airspace opacification   · Continue Azithromycin and Ceftriaxone - Day 1  · Continue Dexamethasone - Day 1  · Start Remdesivir - Day 1   · Follow LFTs   · Start Vitamin C and Zinc   · Follow inflammatory markers  · Continue lovenox   · Check Ddimer in AM - if elevated will need full dose lovenox   · Requiring 2 LPM NC  · Low grade fever    · Continue inhalers      Angioedema PTA  Dysphagia   · Angioedema reported by EMS and was given epi and benadryl in route to hospital  · Stop ACE   · Angioedema now resolved  · Patient reports trouble swallowing - have Speech Evaluation   · Recent GI notes from 2020 - unremarkable EGD and suggested esophagogram XR of esophagus     Hypokalemia   · K 3.4 - replete with K   · Follow BMP     Type II Diabetes  Morbid Obesity   ·  HgbA1c 8.8  · Patient stated she takes Metformin BID - resume   · SSI     Hypertension   Hyperlipidemia  · Stop Lisinopril with reported angioedema  · Continue metoprolol and atorvastatin   · Monitor      Patient reports taking topamax - Unclear reason at this time but patient also reported taking phentermine which has been taken off the market. Continue topamax for now and ask pharmacy to complete a medication reconciliation     Depression Continue aripiprazole, bupropion  GERD Continue protonix - allergy to H2 Blockers   Tobacco Abuse Stopped smoking when diagnosed with COVID      40 or above Morbid obesity / Body mass index is 42.97 kg/m².     Code status: Full  Prophylaxis: Lovenox  Recommended Disposition: Home w/Family     Subjective:     Chief Complaint / Reason for Order printed.   Physician Visit  Patient seen at bedside, has no complaints. Discussed plan of care, she is in agreement. Discussed with RN events overnight. Review of Systems:  Symptom Y/N Comments  Symptom Y/N Comments   Fever/Chills n   Chest Pain n    Poor Appetite n   Edema n    Cough y   Abdominal Pain n    Sputum n   Joint Pain n    SOB/BARNES y   Pruritis/Rash n    Nausea/vomit n   Tolerating PT/OT     Diarrhea n   Tolerating Diet y    Constipation n   Other       Could NOT obtain due to:      Objective:     VITALS:   Last 24hrs VS reviewed since prior progress note. Most recent are:  Patient Vitals for the past 24 hrs:   Temp Pulse Resp BP SpO2   01/24/21 1330  99 (!) 31 (!) 167/95 96 %   01/24/21 1237  (!) 105 (!) 38  96 %   01/24/21 1230  (!) 103 21 136/77 97 %   01/24/21 1145  (!) 103 27 138/80 95 %   01/24/21 1140 99.2 °F (37.3 °C)    95 %   01/24/21 1102  (!) 122 (!) 32 (!) 154/78 94 %   01/24/21 1002  (!) 115  137/76 95 %   01/24/21 0930  (!) 110 (!) 31 137/76 95 %   01/24/21 0845  (!) 113 (!) 37 139/82 98 %   01/24/21 0730 99.9 °F (37.7 °C) (!) 116 26 123/74 91 %   01/24/21 0541  (!) 108  129/78    01/24/21 0500 99.8 °F (37.7 °C) (!) 108 (!) 37 133/80 92 %   01/24/21 0420  (!) 112 (!) 45  95 %   01/24/21 0417 99.3 °F (37.4 °C) (!) 117 (!) 44 (!) 154/79 94 %   01/24/21 0415    (!) 154/79        Intake/Output Summary (Last 24 hours) at 1/24/2021 1432  Last data filed at 1/24/2021 1242  Gross per 24 hour   Intake    Output 400 ml   Net -400 ml        I had a face to face encounter and independently examined this patient on 1/24/2021, as outlined below:  PHYSICAL EXAM:  General: WD, WN. Alert, cooperative, AAF in no acute distress    EENT:  EOMI. Anicteric sclerae. MMM  Resp:  LS coarse, no wheezing or rales. No accessory muscle use  CV:  Regular  rhythm,  No edema  GI:  Soft, obese, Non tender. +Bowel sounds  Neurologic:  Alert and oriented X 3, normal speech,   Psych:   Good insight.  Not anxious nor agitated  Skin:  No rashes. No jaundice    Reviewed most current lab test results and cultures  YES  Reviewed most current radiology test results   YES  Review and summation of old records today    NO  Reviewed patient's current orders and MAR    YES  PMH/SH reviewed - no change compared to H&P  ________________________________________________________________________  Care Plan discussed with:    Comments   Patient x    Family      RN x    Care Manager     Consultant                        Multidiciplinary team rounds were held today with , nursing, pharmacist and clinical coordinator. Patient's plan of care was discussed; medications were reviewed and discharge planning was addressed. ________________________________________________________________________  Total NON critical care TIME:  25   Minutes    Total CRITICAL CARE TIME Spent:   Minutes non procedure based      Comments   >50% of visit spent in counseling and coordination of care x    ________________________________________________________________________  David Silva NP     Procedures: see electronic medical records for all procedures/Xrays and details which were not copied into this note but were reviewed prior to creation of Plan. LABS:  I reviewed today's most current labs and imaging studies.   Pertinent labs include:  Recent Labs     01/24/21 0424   WBC 5.7   HGB 10.5*   HCT 34.4*        Recent Labs     01/24/21  0656 01/24/21 0424   NA  --  132*   K  --  3.4*   CL  --  98   CO2  --  26   GLU  --  217*   BUN  --  9   CREA  --  1.00   CA  --  8.0*   MG 1.9  --    ALB  --  2.9*   TBILI  --  0.8   ALT  --  52       Signed: David Silva NP

## 2021-04-16 ENCOUNTER — TRANSCRIBE ORDER (OUTPATIENT)
Dept: SCHEDULING | Age: 56
End: 2021-04-16

## 2021-04-16 DIAGNOSIS — R06.02 SHORTNESS OF BREATH: Primary | ICD-10-CM

## 2021-04-27 ENCOUNTER — HOSPITAL ENCOUNTER (OUTPATIENT)
Dept: NON INVASIVE DIAGNOSTICS | Age: 56
Discharge: HOME | End: 2021-04-27
Attending: INTERNAL MEDICINE
Payer: MEDICARE

## 2021-04-27 VITALS
BODY MASS INDEX: 42.85 KG/M2 | DIASTOLIC BLOOD PRESSURE: 72 MMHG | WEIGHT: 218.26 LBS | SYSTOLIC BLOOD PRESSURE: 114 MMHG | HEIGHT: 60 IN

## 2021-04-27 DIAGNOSIS — R06.02 SHORTNESS OF BREATH: ICD-10-CM

## 2021-04-27 LAB
ECHO AV PEAK GRADIENT: 7.48 MMHG
ECHO AV PEAK VELOCITY: 136.77 CM/S
ECHO LA MAJOR AXIS: 3.24 CM
ECHO LA MINOR AXIS: 1.67 CM
ECHO LV INTERNAL DIMENSION DIASTOLIC: 4.71 CM (ref 3.9–5.3)
ECHO LV INTERNAL DIMENSION SYSTOLIC: 3.36 CM
ECHO LV IVSD: 0.91 CM (ref 0.6–0.9)
ECHO LV MASS 2D: 170.7 G (ref 67–162)
ECHO LV MASS INDEX 2D: 88.1 G/M2 (ref 43–95)
ECHO LV POSTERIOR WALL DIASTOLIC: 1.14 CM (ref 0.6–0.9)
ECHO LVOT PEAK GRADIENT: 3.04 MMHG
ECHO LVOT PEAK VELOCITY: 87.12 CM/S
ECHO MV A VELOCITY: 89.96 CM/S
ECHO MV E DECELERATION TIME (DT): 119.7 MS
ECHO MV E VELOCITY: 68.68 CM/S
ECHO MV E/A RATIO: 0.76
ECHO PV PEAK INSTANTANEOUS GRADIENT SYSTOLIC: 2.25 MMHG
ECHO RV TAPSE: 2.36 CM (ref 1.5–2)
ECHO TV REGURGITANT MAX VELOCITY: 233.31 CM/S
ECHO TV REGURGITANT PEAK GRADIENT: 21.77 MMHG

## 2021-04-27 PROCEDURE — 93306 TTE W/DOPPLER COMPLETE: CPT

## 2021-06-24 ENCOUNTER — OFFICE VISIT (OUTPATIENT)
Dept: CARDIOLOGY CLINIC | Age: 56
End: 2021-06-24
Payer: MEDICARE

## 2021-06-24 VITALS
SYSTOLIC BLOOD PRESSURE: 124 MMHG | HEART RATE: 87 BPM | HEIGHT: 60 IN | BODY MASS INDEX: 47.83 KG/M2 | RESPIRATION RATE: 20 BRPM | DIASTOLIC BLOOD PRESSURE: 76 MMHG | WEIGHT: 243.6 LBS | OXYGEN SATURATION: 98 %

## 2021-06-24 DIAGNOSIS — I10 ESSENTIAL HYPERTENSION: ICD-10-CM

## 2021-06-24 DIAGNOSIS — Z82.49 FAMILY HISTORY OF PREMATURE CAD: ICD-10-CM

## 2021-06-24 DIAGNOSIS — G47.33 OSA ON CPAP: ICD-10-CM

## 2021-06-24 DIAGNOSIS — R06.02 SHORTNESS OF BREATH: Primary | ICD-10-CM

## 2021-06-24 DIAGNOSIS — E66.01 OBESITY, MORBID (HCC): ICD-10-CM

## 2021-06-24 DIAGNOSIS — Z99.89 OSA ON CPAP: ICD-10-CM

## 2021-06-24 DIAGNOSIS — M79.89 LEG SWELLING: ICD-10-CM

## 2021-06-24 PROCEDURE — G8752 SYS BP LESS 140: HCPCS | Performed by: SPECIALIST

## 2021-06-24 PROCEDURE — G8754 DIAS BP LESS 90: HCPCS | Performed by: SPECIALIST

## 2021-06-24 PROCEDURE — 99204 OFFICE O/P NEW MOD 45 MIN: CPT | Performed by: SPECIALIST

## 2021-06-24 RX ORDER — ERGOCALCIFEROL 1.25 MG/1
CAPSULE ORAL
COMMUNITY
Start: 2021-06-01

## 2021-06-24 RX ORDER — METFORMIN HYDROCHLORIDE 1000 MG/1
TABLET ORAL
COMMUNITY
Start: 2021-05-11

## 2021-06-24 RX ORDER — DICLOFENAC SODIUM 10 MG/G
GEL TOPICAL AS NEEDED
COMMUNITY
Start: 2021-05-26

## 2021-06-24 RX ORDER — FUROSEMIDE 40 MG/1
20 TABLET ORAL DAILY
Status: ON HOLD | COMMUNITY
Start: 2020-09-04 | End: 2022-11-01 | Stop reason: CLARIF

## 2021-06-24 RX ORDER — OXYCODONE AND ACETAMINOPHEN 5; 325 MG/1; MG/1
TABLET ORAL
COMMUNITY
Start: 2021-05-27

## 2021-06-24 RX ORDER — CALCIUM CITRATE/VITAMIN D3 200MG-6.25
TABLET ORAL
Status: ON HOLD | COMMUNITY
Start: 2021-05-06 | End: 2022-11-01 | Stop reason: CLARIF

## 2021-06-24 RX ORDER — METHYLPHENIDATE HYDROCHLORIDE 10 MG/1
TABLET ORAL
COMMUNITY
Start: 2021-05-26

## 2021-06-24 RX ORDER — INSULIN ASPART 100 [IU]/ML
INJECTION, SOLUTION INTRAVENOUS; SUBCUTANEOUS
COMMUNITY
Start: 2021-06-06

## 2021-06-24 RX ORDER — INSULIN GLARGINE 100 [IU]/ML
60 INJECTION, SOLUTION SUBCUTANEOUS
Status: ON HOLD | COMMUNITY
Start: 2021-06-01 | End: 2022-11-01 | Stop reason: CLARIF

## 2021-06-24 RX ORDER — DEXTROAMPHETAMINE SACCHARATE, AMPHETAMINE ASPARTATE, DEXTROAMPHETAMINE SULFATE AND AMPHETAMINE SULFATE 5; 5; 5; 5 MG/1; MG/1; MG/1; MG/1
TABLET ORAL
COMMUNITY
Start: 2021-05-26

## 2021-06-24 RX ORDER — METHOCARBAMOL 500 MG/1
TABLET, FILM COATED ORAL
Status: ON HOLD | COMMUNITY
Start: 2021-06-23 | End: 2022-11-01 | Stop reason: CLARIF

## 2021-06-24 RX ORDER — BUDESONIDE AND FORMOTEROL FUMARATE DIHYDRATE 80; 4.5 UG/1; UG/1
AEROSOL RESPIRATORY (INHALATION)
COMMUNITY
Start: 2021-05-07

## 2021-06-24 NOTE — PROGRESS NOTES
HISTORY OF PRESENT ILLNESS  Gumaro Cartagena is a 54 y.o. female     SUMMARY:   Problem List  Date Reviewed: 2021        Codes Class Noted    Tachypnea ICD-10-CM: R06.82  ICD-9-CM: 786.06  2021        Hypokalemia ICD-10-CM: E87.6  ICD-9-CM: 276.8  2021        Difficulty swallowing ICD-10-CM: R13.10  ICD-9-CM: 787.20  2021        Pneumonia due to COVID-19 virus ICD-10-CM: U07.1, J12.82  ICD-9-CM: 480.8, 079.89  2021        Class 3 severe obesity with body mass index (BMI) of 40.0 to 44.9 in adult [facility]) ICD-10-CM: E66.01, Z68.41  ICD-9-CM: 278.01, V85.41  2020        S/P ventral herniorrhaphy ICD-10-CM: Z98.890, Z87.19  ICD-9-CM: V45.89  2018        Obesity, morbid ([address] ICD-10-CM: E66.01  ICD-9-CM: 278.01  2017        SHAGUFTA on CPAP ICD-10-CM: G47.33, Z99.89  ICD-9-CM: 327.23, V46.8  3/9/2017        Essential hypertension ICD-10-CM: I10  ICD-9-CM: 401.9  3/9/2017        Smoker ICD-10-CM: F17.200  ICD-9-CM: 305.1  3/9/2017        Thyroid nodule ICD-10-CM: E04.1  ICD-9-CM: 241.0  10/1/2015        Granulation tissue of vaginal cuff ICD-10-CM: N89.8  ICD-9-CM: 623.8  2014        H/O:  ICD-10-CM: Z98.891  ICD-9-CM: V45.89  2014        H/O tubal ligation ICD-10-CM: Z98.51  ICD-9-CM: V26.51  2014        Obesity (BMI 35.0-39.9 without comorbidity) ICD-10-CM: E66.9  ICD-9-CM: 278.00  Unknown        Posttraumatic stress disorder ICD-10-CM: F43.10  ICD-9-CM: 309.81  8/10/2012        Recurrent major depression ([facility] ICD-10-CM: F33.9  ICD-9-CM: 296.30  2010              Current Outpatient Medications on File Prior to Visit   Medication Sig    True Metrix Glucose Test Strip strip     budesonide-formoteroL (SYMBICORT) 80-4.5 mcg/actuation HFAA inhale 2 puffs by mouth twice a day Rinse mouth after use    dextroamphetamine-amphetamine (ADDERALL) 20 mg tablet take 1 tablet by mouth every morning    diclofenac (VOLTAREN) 1 % gel apply 4 grams to affected area four times a day    Vitamin D2 1,250 mcg (50,000 unit) capsule take 1 capsule by mouth every week    furosemide (LASIX) 40 mg tablet 40 mg = 1 tab each dose, PO, daily, # 30 tab, 0 Refills    NovoLOG Flexpen U-100 Insulin 100 unit/mL (3 mL) inpn INSTILL 10 UNITS 3X A DAY BEFORE MEALS    metFORMIN (GLUCOPHAGE) 1,000 mg tablet take 1 tablet by mouth twice a day    methocarbamoL (ROBAXIN) 500 mg tablet     methylphenidate HCl (RITALIN) 10 mg tablet take 1 tablet by mouth once daily if needed AROUND NOON    oxyCODONE-acetaminophen (PERCOCET) 5-325 mg per tablet take 1 tablet by mouth twice a day if needed for chronic pain    ARIPiprazole (Abilify) 5 mg tablet Take 5 mg by mouth daily.  omeprazole (PRILOSEC) 40 mg capsule Take 40 mg by mouth daily.  topiramate (TOPAMAX) 50 mg tablet Take 50 mg by mouth nightly.  metoprolol succinate (TOPROL-XL) 25 mg XL tablet Take 1 Tab by mouth daily.  atorvastatin (LIPITOR) 20 mg tablet  20 mg = 1 tab each dose, PO, bedtime, 0 Refills    albuterol (PROVENTIL HFA, VENTOLIN HFA) 90 mcg/actuation inhaler Take 2 Puffs by inhalation every four (4) hours as needed.  Lantus Solostar U-100 Insulin 100 unit/mL (3 mL) inpn nightly.  phentermine 37.5 mg capsule take 1 capsule by mouth every morning (Patient not taking: Reported on 6/24/2021)    lisinopril (PRINIVIL, ZESTRIL) 10 mg tablet Take 10 mg by mouth daily. (Patient not taking: Reported on 6/24/2021)    ADVAIR DISKUS 250-50 mcg/dose diskus inhaler Take 1 Puff by inhalation two (2) times a day. (Patient not taking: Reported on 6/24/2021)    buPROPion XL (WELLBUTRIN XL) 150 mg tablet Take 300 mg by mouth daily.  mirabegron ER (MYRBETRIQ) 25 mg ER tablet Take 25 mg by mouth daily. (Patient not taking: Reported on 6/24/2021)     No current facility-administered medications on file prior to visit.        CARDIOLOGY STUDIES TO DATE:  4/21 mild tr, otherwise normal echo    Chief Complaint   Patient presents with    Establish Care     New Pt; C/O Bilat leg swelling, SOB at rest&w/exertion, occ L arm pain    New Patient     HPI :  She is referred by her her primary care for cardiac evaluation. She was last seen by them on the ninth of this month and I have reviewed and summarized that visit in her chart. At that time she had an EKG which showed normal sinus rhythm normal intervals and axis and no ST-T wave changes. She has hypertension, diabetes, hyperlipidemia, a family history of premature coronary disease, and is a prior smoker, having quit when she got Covid in January of this year. She has known sleep apnea and apparently CPAP has been ineffective so she was just recently started on BiPAP. She also has known COPD and follows closely with [facility]. For the last couple months she has noted increased shortness of breath orthopnea and leg edema. She has been on and off diuretics but was restarted on Lasix a couple of weeks ago when she met with her primary care and so for this not made much difference. She says she does not add salt to her food and does read labels but she was uncertain about the 5428 mg restriction so we went over that in detail. CARDIAC ROS:   negative for chest pain, palpitations, syncope, paroxysmal nocturnal dyspnea, exertional chest pressure/discomfort, claudication    Family History   Problem Relation Age of Onset    Cancer Mother 61        Breast cancer    Breast Cancer Mother 48    Heart Disease Father 50        M. IIsaac Query Asthma Sister     Heart Disease Brother     Hypertension Brother     Hypertension Brother     Diabetes Brother     Lung Disease Brother         COPD       Past Medical History:   Diagnosis Date    Chronic pelvic pain in female 7/9/2014    Depression     Diabetes ([facility]     Hypertension     Obesity (BMI 35.0-39.9 without comorbidity)     SHAGUFTA on CPAP 3/9/2017    Posttraumatic stress disorder     Psychiatric disorder     depression    Recurrent major depression ([facility] 7/27/2010    S/P ventral herniorrhaphy 2/1/2018    Suicidal thoughts     Thyroid nodule 10/1/2015    Unspecified sleep apnea     uses cpap       GENERAL ROS:  A comprehensive review of systems was negative except for that written in the HPI. Visit Vitals  /76 (BP 1 Location: Right arm, BP Patient Position: Sitting, BP Cuff Size: Large adult)   Pulse 87   Resp 20   Ht 5' (1.524 m)   Wt 243 lb 9.6 oz (110.5 kg)   LMP 07/20/2014   SpO2 98%   BMI 47.57 kg/m²       Wt Readings from Last 3 Encounters:   06/24/21 243 lb 9.6 oz (110.5 kg)   04/27/21 218 lb 4.1 oz (99 kg)   02/22/21 220 lb (99.8 kg)            BP Readings from Last 3 Encounters:   06/24/21 124/76   04/27/21 114/72   02/22/21 114/72       PHYSICAL EXAM  General appearance: alert, cooperative, no distress, appears stated age  Neurologic: Alert and oriented X 3  Neck: supple, symmetrical, trachea midline, no adenopathy, no carotid bruit and no JVD  Lungs: clear to auscultation bilaterally  Heart: regular rate and rhythm, S1, S2 normal, no murmur, click, rub or gallop  Abdomen: morbidly obese  Extremities: extremities normal, atraumatic, no cyanosis or edema, edema 1+  Pulses: 2+ and symmetric    Lab Results   Component Value Date/Time    Cholesterol, total 127 02/18/2013 05:53 AM    Cholesterol, total 166 08/11/2012 05:20 AM    HDL Cholesterol 37 02/18/2013 05:53 AM    HDL Cholesterol 31 08/11/2012 05:20 AM    LDL, calculated 68.8 02/18/2013 05:53 AM    LDL, calculated 96.2 08/11/2012 05:20 AM    Triglyceride 106 02/18/2013 05:53 AM    Triglyceride 194 (H) 08/11/2012 05:20 AM    CHOL/HDL Ratio 3.4 02/18/2013 05:53 AM    CHOL/HDL Ratio 5.4 (H) 08/11/2012 05:20 AM     ASSESSMENT :      I suspect her symptoms are multifactorial and related to her obesity COPD probably some Covid residual effects on her lung function, morbid obesity, and and adequately treated sleep apnea.   We talked about salt restriction elevation and compression stockings and I would advise her to stay on Lasix. Hopefully now that she is got BiPAP after a period of time things will start moving in the right direction assuming that it is effective. Given all of her cardiac risk factors I do think she needs further stratification with a Lexiscan Cardiolite, to make sure that she does not have any evidence of significant coronary disease with shortness of breath being an anginal equivalent. current treatment plan is effective, no change in therapy  lab results and schedule of future lab studies reviewed with patient  reviewed diet, exercise and weight control    Encounter Diagnoses   Name Primary?  Essential hypertension Yes    SHAGUFTA on CPAP     Obesity, morbid (HCC)     Shortness of breath     Leg swelling      Orders Placed This Encounter    True Metrix Glucose Test Strip strip    budesonide-formoteroL (SYMBICORT) 80-4.5 mcg/actuation HFAA    dextroamphetamine-amphetamine (ADDERALL) 20 mg tablet    diclofenac (VOLTAREN) 1 % gel    Vitamin D2 1,250 mcg (50,000 unit) capsule    furosemide (LASIX) 40 mg tablet    NovoLOG Flexpen U-100 Insulin 100 unit/mL (3 mL) inpn    Lantus Solostar U-100 Insulin 100 unit/mL (3 mL) inpn    metFORMIN (GLUCOPHAGE) 1,000 mg tablet    methocarbamoL (ROBAXIN) 500 mg tablet    methylphenidate HCl (RITALIN) 10 mg tablet    oxyCODONE-acetaminophen (PERCOCET) 5-325 mg per tablet       Follow-up and Dispositions    · Return in about 3 months (around 9/24/2021). Rand West MD  6/24/2021  Please note that this dictation was completed with [employer], the computer voice recognition software. Quite often unanticipated grammatical, syntax, homophones, and other interpretive errors are inadvertently transcribed by the computer software. Please disregard these errors. Please excuse any errors that have escaped final proofreading. Thank you.

## 2021-06-24 NOTE — PROGRESS NOTES
1. Have you been to the ER, urgent care clinic since your last visit? Hospitalized since your last visit? No    2. Have you seen or consulted any other health care providers outside of the [address] [place] [address] since your last visit? Include any pap smears or colon screening.    [facility], Dr. Chris Sorenson) and Dr. Ata Vaughan apnea)

## 2021-07-02 ENCOUNTER — TELEPHONE (OUTPATIENT)
Dept: CARDIOLOGY CLINIC | Age: 56
End: 2021-07-02

## 2021-07-02 NOTE — TELEPHONE ENCOUNTER
Called and spoke with Song Kiran NP. She stated patient is scheduled for an elective shoulder rotator cuff surgery under general anesthesia on 7/8. She saw patient today and patient seemed very sob, had a lot of edema, low mets, and struggled just to go to the bathroom. She knows patient has pulmonary issues also and that she is scheduled for a stress test. She said the surgery is elective and can be pushed back if Dr. Regina Garibay thinks it should wait until after stress test. I told her I would message him and let her know. She has patient's last note, but not echo from April. I told her I will fax response and echo if okay. She said it was.     Fax # [phone]

## 2021-07-02 NOTE — TELEPHONE ENCOUNTER
Jesus Adams states she saw the patient this morning and has some concerns. Jesus Adams states she has surgery on the 8th, and states the patient shortness of breathe. Jesus Adams is requesting a call back today.     PHONE: [phone]

## 2021-07-02 NOTE — TELEPHONE ENCOUNTER
Her issues are mostly pulmonary, but given all her risk factors she does need to have stress test and make sure no siginificant heart issues, before doing elective surgery.  Ok to fax echo and note

## 2021-07-15 ENCOUNTER — HOSPITAL ENCOUNTER (OUTPATIENT)
Dept: NUCLEAR MEDICINE | Age: 56
Discharge: HOME | End: 2021-07-15
Attending: SPECIALIST
Payer: MEDICARE

## 2021-07-15 ENCOUNTER — HOSPITAL ENCOUNTER (OUTPATIENT)
Dept: NON INVASIVE DIAGNOSTICS | Age: 56
Discharge: HOME | End: 2021-07-15
Attending: SPECIALIST
Payer: MEDICARE

## 2021-07-15 VITALS
DIASTOLIC BLOOD PRESSURE: 85 MMHG | SYSTOLIC BLOOD PRESSURE: 143 MMHG | BODY MASS INDEX: 47.71 KG/M2 | WEIGHT: 243 LBS | HEIGHT: 60 IN

## 2021-07-15 DIAGNOSIS — I10 ESSENTIAL HYPERTENSION: ICD-10-CM

## 2021-07-15 DIAGNOSIS — Z99.89 OSA ON CPAP: ICD-10-CM

## 2021-07-15 DIAGNOSIS — R06.02 SHORTNESS OF BREATH: ICD-10-CM

## 2021-07-15 DIAGNOSIS — G47.33 OSA ON CPAP: ICD-10-CM

## 2021-07-15 DIAGNOSIS — E66.01 OBESITY, MORBID (HCC): ICD-10-CM

## 2021-07-15 DIAGNOSIS — M79.89 LEG SWELLING: ICD-10-CM

## 2021-07-15 LAB
STRESS BASELINE DIAS BP: 85 MMHG
STRESS BASELINE HR: 75 BPM
STRESS BASELINE SYS BP: 143 MMHG
STRESS ESTIMATED WORKLOAD: 1 METS
STRESS EXERCISE DUR MIN: NORMAL
STRESS O2 SAT PEAK: 96 %
STRESS O2 SAT REST: 99 %
STRESS PEAK DIAS BP: 80 MMHG
STRESS PEAK SYS BP: 163 MMHG
STRESS PERCENT HR ACHIEVED: 61 %
STRESS POST PEAK HR: 100 BPM
STRESS RATE PRESSURE PRODUCT: NORMAL BPM*MMHG
STRESS ST DEPRESSION: 0 MM
STRESS ST ELEVATION: 0 MM
STRESS STAGE 1 BP: NORMAL MMHG
STRESS STAGE 1 COMMENTS: NORMAL
STRESS STAGE 1 DURATION: NORMAL MIN:SEC
STRESS STAGE 1 HR: 74 BPM
STRESS STAGE 2 BP: NORMAL MMHG
STRESS STAGE 2 COMMENTS: NORMAL
STRESS STAGE 2 DURATION: NORMAL MIN:SEC
STRESS STAGE 2 HR: 96 BPM
STRESS STAGE 3 BP: NORMAL MMHG
STRESS STAGE 3 COMMENTS: NORMAL
STRESS STAGE 3 DURATION: NORMAL MIN:SEC
STRESS STAGE 3 HR: 93 BPM
STRESS STAGE 4 BP: NORMAL MMHG
STRESS STAGE 4 COMMENTS: NORMAL
STRESS STAGE 4 DURATION: NORMAL MIN:SEC
STRESS STAGE 4 HR: 89 BPM
STRESS TARGET HR: 165 BPM

## 2021-07-15 PROCEDURE — 74011250636 HC RX REV CODE- 250/636: Performed by: SPECIALIST

## 2021-07-15 PROCEDURE — A9500 TC99M SESTAMIBI: HCPCS

## 2021-07-15 RX ORDER — NITROGLYCERIN 0.4 MG/1
TABLET SUBLINGUAL
Status: DISCONTINUED
Start: 2021-07-15 | End: 2021-07-15 | Stop reason: WASHOUT

## 2021-07-15 RX ORDER — SODIUM CHLORIDE 0.9 % (FLUSH) 0.9 %
10 SYRINGE (ML) INJECTION AS NEEDED
Status: DISCONTINUED | OUTPATIENT
Start: 2021-07-15 | End: 2021-07-19 | Stop reason: HOSPADM

## 2021-07-15 RX ORDER — AMINOPHYLLINE 25 MG/ML
INJECTION, SOLUTION INTRAVENOUS
Status: DISCONTINUED
Start: 2021-07-15 | End: 2021-07-15 | Stop reason: WASHOUT

## 2021-07-15 RX ORDER — TETRAKIS(2-METHOXYISOBUTYLISOCYANIDE)COPPER(I) TETRAFLUOROBORATE 1 MG/ML
10 INJECTION, POWDER, LYOPHILIZED, FOR SOLUTION INTRAVENOUS
Status: COMPLETED | OUTPATIENT
Start: 2021-07-15 | End: 2021-07-15

## 2021-07-15 RX ORDER — ATROPINE SULFATE 0.1 MG/ML
INJECTION INTRAVENOUS
Status: DISCONTINUED
Start: 2021-07-15 | End: 2021-07-15 | Stop reason: WASHOUT

## 2021-07-15 RX ORDER — TETRAKIS(2-METHOXYISOBUTYLISOCYANIDE)COPPER(I) TETRAFLUOROBORATE 1 MG/ML
30 INJECTION, POWDER, LYOPHILIZED, FOR SOLUTION INTRAVENOUS
Status: COMPLETED | OUTPATIENT
Start: 2021-07-15 | End: 2021-07-15

## 2021-07-15 RX ADMIN — REGADENOSON 0.4 MG: 0.08 INJECTION, SOLUTION INTRAVENOUS at 10:21

## 2021-07-15 RX ADMIN — TETRAKIS(2-METHOXYISOBUTYLISOCYANIDE)COPPER(I) TETRAFLUOROBORATE 30 MILLICURIE: 1 INJECTION, POWDER, LYOPHILIZED, FOR SOLUTION INTRAVENOUS at 10:30

## 2021-07-15 RX ADMIN — Medication 10 ML: at 10:23

## 2021-07-15 RX ADMIN — Medication 10 ML: at 10:13

## 2021-07-15 RX ADMIN — TETRAKIS(2-METHOXYISOBUTYLISOCYANIDE)COPPER(I) TETRAFLUOROBORATE 10 MILLICURIE: 1 INJECTION, POWDER, LYOPHILIZED, FOR SOLUTION INTRAVENOUS at 08:10

## 2021-07-16 ENCOUNTER — TELEPHONE (OUTPATIENT)
Dept: CARDIOLOGY CLINIC | Age: 56
End: 2021-07-16

## 2021-07-16 NOTE — TELEPHONE ENCOUNTER
Spoke with patient. Verified patient with two patient identifiers. Discussed result note below from provider with patient in detail. Patient verbalized understanding and stated no further questions at this time.

## 2021-07-16 NOTE — TELEPHONE ENCOUNTER
----- Message from Nae Ho MD sent at 7/15/2021  5:18 PM EDT -----  Normal, no evidence of any blockages as cause for symptoms

## 2021-08-03 ENCOUNTER — HOSPITAL ENCOUNTER (EMERGENCY)
Age: 56
Discharge: HOME | End: 2021-08-03
Attending: EMERGENCY MEDICINE
Payer: MEDICARE

## 2021-08-03 VITALS
RESPIRATION RATE: 16 BRPM | TEMPERATURE: 98 F | HEART RATE: 95 BPM | BODY MASS INDEX: 48.88 KG/M2 | DIASTOLIC BLOOD PRESSURE: 78 MMHG | SYSTOLIC BLOOD PRESSURE: 150 MMHG | WEIGHT: 249 LBS | OXYGEN SATURATION: 100 % | HEIGHT: 60 IN

## 2021-08-03 DIAGNOSIS — I10 ESSENTIAL HYPERTENSION: ICD-10-CM

## 2021-08-03 DIAGNOSIS — R73.9 HYPERGLYCEMIA: Primary | ICD-10-CM

## 2021-08-03 DIAGNOSIS — Z91.14: ICD-10-CM

## 2021-08-03 LAB
GLUCOSE BLD STRIP.AUTO-MCNC: 312 MG/DL (ref 65–117)
SERVICE CMNT-IMP: ABNORMAL

## 2021-08-03 PROCEDURE — 82962 GLUCOSE BLOOD TEST: CPT

## 2021-08-03 PROCEDURE — 99284 EMERGENCY DEPT VISIT MOD MDM: CPT

## 2021-08-03 NOTE — ED PROVIDER NOTES
EMERGENCY DEPARTMENT HISTORY AND PHYSICAL EXAM      Date: 8/3/2021  Patient Name: Tawanda Barney    History of Presenting Illness     Chief Complaint   Patient presents with    High Blood Sugar     History Provided By: Patient    HPI: Tawanda Barney, 54 y.o. female with past medical history significant for diabetes, hypertension, depression, obstructive sleep apnea, and PTSD who presents via EMS to the ED with cc of elevated blood glucose. Patient states she had her left rotator cuff repaired at [facility] on Friday and has not taken any medications since that morning. She normally is compliant with her medications but states that she cannot use her left arm due to her recent surgery. EMS checked her blood sugar at home and did give her 10 units of NovoLog subcu prior to bringing her today. They also administered all of her oral home medications. Patient states that she lives with her daughter and a son but they have not been helpful as far as administering her medications. PMHx: Diabetes, hypertension, depression, obstructive sleep apnea, and PTSD  Social Hx: Former smoker, denies alcohol use, denies illegal drug use    PCP: Grey Hawthorne MD    There are no other complaints, changes, or physical findings at this time. No current facility-administered medications on file prior to encounter.      Current Outpatient Medications on File Prior to Encounter   Medication Sig Dispense Refill    furosemide (LASIX) 40 mg tablet 40 mg = 1 tab each dose, PO, daily, # 30 tab, 0 Refills      NovoLOG Flexpen U-100 Insulin 100 unit/mL (3 mL) inpn INSTILL 10 UNITS 3X A DAY BEFORE MEALS      metFORMIN (GLUCOPHAGE) 1,000 mg tablet take 1 tablet by mouth twice a day      methocarbamoL (ROBAXIN) 500 mg tablet       methylphenidate HCl (RITALIN) 10 mg tablet take 1 tablet by mouth once daily if needed AROUND NOON      oxyCODONE-acetaminophen (PERCOCET) 5-325 mg per tablet take 1 tablet by mouth twice a day if needed for chronic pain      ARIPiprazole (Abilify) 5 mg tablet Take 5 mg by mouth daily.  omeprazole (PRILOSEC) 40 mg capsule Take 40 mg by mouth daily.  topiramate (TOPAMAX) 50 mg tablet Take 50 mg by mouth nightly.  buPROPion XL (WELLBUTRIN XL) 150 mg tablet Take 300 mg by mouth daily.  metoprolol succinate (TOPROL-XL) 25 mg XL tablet Take 1 Tab by mouth daily. 30 Tab 3    atorvastatin (LIPITOR) 20 mg tablet  20 mg = 1 tab each dose, PO, bedtime, 0 Refills      True Metrix Glucose Test Strip strip  (Patient not taking: Reported on 8/3/2021)      budesonide-formoteroL (SYMBICORT) 80-4.5 mcg/actuation HFAA inhale 2 puffs by mouth twice a day Rinse mouth after use (Patient not taking: Reported on 8/3/2021)      dextroamphetamine-amphetamine (ADDERALL) 20 mg tablet take 1 tablet by mouth every morning (Patient not taking: Reported on 8/3/2021)      diclofenac (VOLTAREN) 1 % gel apply 4 grams to affected area four times a day (Patient not taking: Reported on 8/3/2021)      Vitamin D2 1,250 mcg (50,000 unit) capsule take 1 capsule by mouth every week (Patient not taking: Reported on 8/3/2021)      Lantus Solostar U-100 Insulin 100 unit/mL (3 mL) inpn nightly. (Patient not taking: Reported on 8/3/2021)      phentermine 37.5 mg capsule take 1 capsule by mouth every morning (Patient not taking: Reported on 6/24/2021)      lisinopril (PRINIVIL, ZESTRIL) 10 mg tablet Take 10 mg by mouth daily. (Patient not taking: Reported on 6/24/2021)      mirabegron ER (MYRBETRIQ) 25 mg ER tablet Take 25 mg by mouth daily. (Patient not taking: Reported on 6/24/2021)      albuterol (PROVENTIL HFA, VENTOLIN HFA) 90 mcg/actuation inhaler Take 2 Puffs by inhalation every four (4) hours as needed.  (Patient not taking: Reported on 8/3/2021)       Past History     Past Medical History:  Past Medical History:   Diagnosis Date    Chronic pelvic pain in female 7/9/2014    Depression     Diabetes ([facility]     Hypertension     Obesity (BMI 35.0-39.9 without comorbidity)     SHAGUFTA on CPAP 3/9/2017    Posttraumatic stress disorder     Psychiatric disorder     depression    Recurrent major depression ([facility] 2010    S/P ventral herniorrhaphy 2018    Suicidal thoughts     Thyroid nodule 10/1/2015    Unspecified sleep apnea     uses cpap     Past Surgical History:  Past Surgical History:   Procedure Laterality Date    HX  SECTION      HX  SECTION      HX HEENT      head and neck surgery     HX HERNIA REPAIR  2018    incarcerated ventral hernia repair with mesh    HX HYSTERECTOMY      HX ORTHOPAEDIC      L ankle surgery     HX ORTHOPAEDIC Left     TOTAL HIP REPLACEMENT    HX TUBAL LIGATION      PPBTL    [place] ABDOMEN SURGERY PROC UNLISTED      cholecystectomy     Family History:  Family History   Problem Relation Age of Onset    Cancer Mother 61        Breast cancer   Lenny Reeves Breast Cancer Mother 48    Heart Disease Father 50        M. I.   Lenny Reeves Asthma Sister     Heart Disease Brother     Hypertension Brother     Hypertension Brother     Diabetes Brother     Lung Disease Brother         COPD     Social History:  Social History     Tobacco Use    Smoking status: Former Smoker     Packs/day: 2.00     Years: 30.00     Pack years: 60.00     Types: Cigarettes     Quit date: 2021     Years since quittin.5    Smokeless tobacco: Never Used   Vaping Use    Vaping Use: Never used   Substance Use Topics    Alcohol use: Never    Drug use: Never     Allergies: Allergies   Allergen Reactions    Zantac [Ranitidine Hcl] Hives    Zantac [Ranitidine Hcl] Hives    Lisinopril Angioedema     Review of Systems   Review of Systems   Constitutional: Negative for chills and fever. HENT: Negative for congestion, rhinorrhea, sneezing and sore throat. Eyes: Negative for redness and visual disturbance. Respiratory: Negative for shortness of breath.     Cardiovascular: Negative for leg swelling. Gastrointestinal: Negative for abdominal pain, nausea and vomiting. Genitourinary: Negative for difficulty urinating and frequency. Musculoskeletal: Negative for back pain, myalgias and neck stiffness. Skin: Negative for rash. Neurological: Negative for dizziness, syncope, weakness and headaches. Hematological: Negative for adenopathy. All other systems reviewed and are negative. Physical Exam   Physical Exam  Vitals and nursing note reviewed. Constitutional:       Appearance: Normal appearance. She is well-developed. She is morbidly obese. HENT:      Head: Normocephalic and atraumatic. Eyes:      Conjunctiva/sclera: Conjunctivae normal.   Cardiovascular:      Rate and Rhythm: Normal rate and regular rhythm. Pulses: Normal pulses. Heart sounds: Normal heart sounds, S1 normal and S2 normal. No murmur heard. Pulmonary:      Effort: Pulmonary effort is normal. No respiratory distress. Breath sounds: Normal breath sounds. No wheezing. Abdominal:      General: Bowel sounds are normal. There is no distension. Palpations: Abdomen is soft. Tenderness: There is no abdominal tenderness. There is no rebound. Musculoskeletal:         General: Normal range of motion. Cervical back: Full passive range of motion without pain, normal range of motion and neck supple. Comments: Left upper extremity in a postop sling/shoulder immobilizer   Skin:     General: Skin is warm and dry. Findings: No rash. Neurological:      Mental Status: She is alert and oriented to person, place, and time. Psychiatric:         Speech: Speech normal.         Behavior: Behavior normal.         Thought Content:  Thought content normal.         Judgment: Judgment normal.       Diagnostic Study Results   Labs -     Recent Results (from the past 12 hour(s))   GLUCOSE, POC    Collection Time: 08/03/21 12:59 PM   Result Value Ref Range    Glucose (POC) 312 (H) 65 - 117 mg/dL    Performed by Andrés Will EDT        Radiologic Studies -   No orders to display     No results found. Medical Decision Making   I am the first provider for this patient. I reviewed the vital signs, available nursing notes, past medical history, past surgical history, family history and social history. Vital Signs-Reviewed the patient's vital signs. Patient Vitals for the past 24 hrs:   Temp Pulse Resp BP SpO2   08/03/21 1245 98 °F (36.7 °C) 95 16 (!) 158/71 100 %     Pulse Oximetry Analysis - 100% on RA (normal)    Records Reviewed: Nursing Notes and Old Medical Records    Provider Notes (Medical Decision Making):   51-year-old female presents via EMS with elevated blood glucose secondary to medication noncompliance ever since her surgery 5 days ago. No concerns for DKA. Will discuss with case management to see what our options are as far as assistance with medications at home. If we are unable to come up with a plan, patient may need temporary placement. ED Course:   Initial assessment performed. The patients presenting problems have been discussed, and they are in agreement with the care plan formulated and outlined with them. I have encouraged them to ask questions as they arise throughout their visit. Progress Note  2:21 PM  I have re-evaluated pt and case management Riley Alanis) has seen and evaluated her. Colin Lopez has spoken to her family members who state they have been around and are helping her. The patient states that she is on Lantus and Humalog flex pens and can put the needles on herself. She does need help getting her medications from the pill bottles into a pillbox but otherwise should be able to administer her medications herself. Progress Note:   Updated pt on all returned results and findings. Discussed the importance of proper follow up as referred below along with return precautions.  Pt in agreement with the care plan and expresses agreement with and understanding of all items discussed. Disposition:  Discharge Note:  The pt is ready for discharge. The pt's signs, symptoms, diagnosis, and discharge instructions have been discussed and pt has conveyed their understanding. The pt is to follow up as recommended or return to ER should their symptoms worsen. Plan has been discussed and pt is in agreement. PLAN:  1. Current Discharge Medication List        2. Follow-up Information     Follow up With Specialties Details Why Contact Kierra Nicole MD Family Medicine Schedule an appointment as soon as possible for a visit   [address] [place] [address]  [address] [place] [address] [address]      [address] [place] [address] in 2 days or at your scheduled appointment [address] [facility] [address]  [phone]    [address] [place] [address] EMERGENCY DEPT Emergency Medicine  As needed, If symptoms worsen [facility]  [phone]        Return to ED if worse     Diagnosis     Clinical Impression:   1. Hyperglycemia    2. Essential hypertension    3. Noncompliance with medication treatment due to difficulty with route of medication administration            Please note that this dictation was completed with Dragon, computer voice recognition software. Quite often unanticipated grammatical, syntax, homophones, and other interpretive errors are inadvertently transcribed by the computer software. Please disregard these errors. Additionally, please excuse any errors that have escaped final proofreading.

## 2021-08-03 NOTE — PROGRESS NOTES
Date of previous inpatient admission/ ED visit? What brought the patient back to ED? Patient presented in ED with elevated blood pressure and sugar levels. BP was 197/93 and blood sugar was 329. Patient had rotor cuff on her left shoulder on 7/23/21 and stated that she has not been able to take her medications or inject her insulin shots. Pt lives with her daughter but stated that her daughter works and goes to school and does not help her with medications. Did patient decline recommended services during last admission/ ED visit (if yes, what)? Has patient seen a provider since their last inpatient admission/ED visit (if yes, when)? PCP: First and Last name: Mirta Samaniego MD   Name of Practice:  [facility]   Are you a current patient: Yes/No:  Yes   Approximate date of last visit: Unknow    CM Interventions:  CM called patient's daughter, Dot Finnegan who stated that she helps patient with her medications. Patient's son. Roberto also lives with them and will help with medications. Lou Mejia provided the name of a cousin, Raina Lebron who is also available to help. Daughter stated that she had suggested that patient use a pill box. CM gave patient a pill box and spoke with Raina Lebron, her cousin who said she would fill it on a weekly basis. Also said that she lives 5 minutes and she is willing to help patient with insulin injections. CM discussed this plan with patient who verbalized understanding.   From previous inpatient admission/ED visit:  From current inpatient admission/ED visit

## 2021-08-03 NOTE — ED NOTES
Pt presents to ED via EMS complaining of hyperglycemia. Per EMS, pt was given 10units of Novalog Subq en route and sugar decreased to 356. Pt reports she had surgery on her right rotator cuff on Friday at [facility]. Pt has been unable to take her medications since Friday d/t being unable to open her medication bottles. Pt reports she lives w/ her daughter but her daughter is unable to take care of her because she is in school and works full time. Pt has no other complaints. Pt is alert and oriented x 4, RR even and unlabored, skin is warm and dry. Assessment completed and pt updated on plan of care. Call bell in reach. Emergency Department Nursing Plan of Care       The Nursing Plan of Care is developed from the Nursing assessment and Emergency Department Attending provider initial evaluation. The plan of care may be reviewed in the ED Provider note.     The Plan of Care was developed with the following considerations:   Patient / Family readiness to learn indicated by:verbalized understanding  Persons(s) to be included in education: patient  Barriers to Learning/Limitations:No    Signed     Hussein Huff RN    8/3/2021   12:53 PM

## 2021-08-03 NOTE — ED NOTES
Discharge instructions were given to the patient by Carlyn Watkins RN. The patient left the Emergency Department ambulatory, alert and oriented and in no acute distress with 0 prescriptions. The patient was encouraged to call or return to the ED for worsening issues or problems and was encouraged to schedule a follow up appointment for continuing care. The patient verbalized understanding of discharge instructions and prescriptions, all questions were answered. The patient has no further concerns at this time.

## 2021-08-12 ENCOUNTER — OFFICE VISIT (OUTPATIENT)
Dept: CARDIOLOGY CLINIC | Age: 56
End: 2021-08-12
Payer: MEDICARE

## 2021-08-12 VITALS
WEIGHT: 252 LBS | BODY MASS INDEX: 49.48 KG/M2 | SYSTOLIC BLOOD PRESSURE: 155 MMHG | HEIGHT: 60 IN | DIASTOLIC BLOOD PRESSURE: 86 MMHG | RESPIRATION RATE: 18 BRPM | OXYGEN SATURATION: 98 % | HEART RATE: 86 BPM

## 2021-08-12 DIAGNOSIS — J43.8 OTHER EMPHYSEMA (HCC): ICD-10-CM

## 2021-08-12 DIAGNOSIS — R06.02 SHORTNESS OF BREATH: ICD-10-CM

## 2021-08-12 DIAGNOSIS — G47.33 OSA ON CPAP: ICD-10-CM

## 2021-08-12 DIAGNOSIS — M79.89 LEG SWELLING: ICD-10-CM

## 2021-08-12 DIAGNOSIS — E66.01 OBESITY, MORBID (HCC): ICD-10-CM

## 2021-08-12 DIAGNOSIS — Z99.89 OSA ON CPAP: ICD-10-CM

## 2021-08-12 DIAGNOSIS — I10 ESSENTIAL HYPERTENSION: Primary | ICD-10-CM

## 2021-08-12 PROCEDURE — G8753 SYS BP > OR = 140: HCPCS | Performed by: SPECIALIST

## 2021-08-12 PROCEDURE — 99214 OFFICE O/P EST MOD 30 MIN: CPT | Performed by: SPECIALIST

## 2021-08-12 PROCEDURE — G9717 DOC PT DX DEP/BP F/U NT REQ: HCPCS | Performed by: SPECIALIST

## 2021-08-12 PROCEDURE — G8754 DIAS BP LESS 90: HCPCS | Performed by: SPECIALIST

## 2021-08-12 PROCEDURE — 3017F COLORECTAL CA SCREEN DOC REV: CPT | Performed by: SPECIALIST

## 2021-08-12 PROCEDURE — G8427 DOCREV CUR MEDS BY ELIG CLIN: HCPCS | Performed by: SPECIALIST

## 2021-08-12 PROCEDURE — G8417 CALC BMI ABV UP PARAM F/U: HCPCS | Performed by: SPECIALIST

## 2021-08-12 RX ORDER — FERROUS SULFATE 324(65)MG
TABLET, DELAYED RELEASE (ENTERIC COATED) ORAL
COMMUNITY
Start: 2021-07-13

## 2021-08-12 NOTE — PROGRESS NOTES
1. Have you been to the ER, urgent care clinic since your last visit? Hospitalized since your last visit? No    2. Have you seen or consulted any other health care providers outside of the [address] [place] [address] since your last visit? Include any pap smears or colon screening. No    Chief Complaint   Patient presents with    Hypertension     1mo f/u; Pt c/o SOB w/exertion, Bilat leg/ankle swelling, Denied other cardiac symptoms     Patient now using BiPAP machine, states feeling a lot better and SOB at night has gotten better, but still occurs mostly with exertion. First an order needs to be placed for Prolia (dont send to pharmacy). Then route to [facility] to check insurance coverage.  If patient okay with cost then we can proceed with setting up injection.    Josselyn Corona RN

## 2021-08-12 NOTE — PROGRESS NOTES
HISTORY OF PRESENT ILLNESS  Yomi Alcaraz is a 54 y.o. female     SUMMARY:   Problem List  Date Reviewed: 2021        Codes Class Noted    Tachypnea ICD-10-CM: R06.82  ICD-9-CM: 786.06  2021        Hypokalemia ICD-10-CM: E87.6  ICD-9-CM: 276.8  2021        Difficulty swallowing ICD-10-CM: R13.10  ICD-9-CM: 787.20  2021        Pneumonia due to COVID-19 virus ICD-10-CM: U07.1, J12.82  ICD-9-CM: 480.8, 079.89  2021        Class 3 severe obesity with body mass index (BMI) of 40.0 to 44.9 in adult [facility]) ICD-10-CM: E66.01, Z68.41  ICD-9-CM: 278.01, V85.41  2020        S/P ventral herniorrhaphy ICD-10-CM: Z98.890, Z87.19  ICD-9-CM: V45.89  2018        Obesity, morbid ([address] ICD-10-CM: E66.01  ICD-9-CM: 278.01  2017        SHAGUFTA on CPAP ICD-10-CM: G47.33, Z99.89  ICD-9-CM: 327.23, V46.8  3/9/2017        Essential hypertension ICD-10-CM: I10  ICD-9-CM: 401.9  3/9/2017        Smoker ICD-10-CM: F17.200  ICD-9-CM: 305.1  3/9/2017        Thyroid nodule ICD-10-CM: E04.1  ICD-9-CM: 241.0  10/1/2015        Granulation tissue of vaginal cuff ICD-10-CM: N89.8  ICD-9-CM: 623.8  2014        H/O:  ICD-10-CM: Z98.891  ICD-9-CM: V45.89  2014        H/O tubal ligation ICD-10-CM: Z98.51  ICD-9-CM: V26.51  2014        Obesity (BMI 35.0-39.9 without comorbidity) ICD-10-CM: E66.9  ICD-9-CM: 278.00  Unknown        Posttraumatic stress disorder ICD-10-CM: F43.10  ICD-9-CM: 309.81  8/10/2012        Recurrent major depression ([facility] ICD-10-CM: F33.9  ICD-9-CM: 296.30  2010              Current Outpatient Medications on File Prior to Visit   Medication Sig    ferrous sulfate 324 mg (65 mg iron) tablet take 1 tablet by mouth once daily    True Metrix Glucose Test Strip strip     budesonide-formoteroL (SYMBICORT) 80-4.5 mcg/actuation HFAA inhale 2 puffs by mouth twice a day Rinse mouth after use    dextroamphetamine-amphetamine (ADDERALL) 20 mg tablet take 1 tablet by mouth every morning    diclofenac (VOLTAREN) 1 % gel as needed.  Vitamin D2 1,250 mcg (50,000 unit) capsule take 1 capsule by mouth every week    NovoLOG Flexpen U-100 Insulin 100 unit/mL (3 mL) inpn INSTILL 10 UNITS 3X A DAY BEFORE MEALS    Lantus Solostar U-100 Insulin 100 unit/mL (3 mL) inpn 40 Units nightly.  metFORMIN (GLUCOPHAGE) 1,000 mg tablet take 1 tablet by mouth twice a day    methocarbamoL (ROBAXIN) 500 mg tablet three (3) times daily as needed.  methylphenidate HCl (RITALIN) 10 mg tablet take 1 tablet by mouth once daily if needed AROUND NOON    oxyCODONE-acetaminophen (PERCOCET) 5-325 mg per tablet take 1 tablet by mouth twice a day if needed for chronic pain    ARIPiprazole (Abilify) 5 mg tablet Take 10 mg by mouth daily.  omeprazole (PRILOSEC) 40 mg capsule Take 40 mg by mouth daily.  topiramate (TOPAMAX) 50 mg tablet Take 50 mg by mouth nightly.  buPROPion XL (WELLBUTRIN XL) 150 mg tablet Take 300 mg by mouth daily.  metoprolol succinate (TOPROL-XL) 25 mg XL tablet Take 1 Tab by mouth daily.  atorvastatin (LIPITOR) 20 mg tablet  20 mg = 1 tab each dose, PO, bedtime, 0 Refills    albuterol (PROVENTIL HFA, VENTOLIN HFA) 90 mcg/actuation inhaler Take 2 Puffs by inhalation every four (4) hours as needed.  furosemide (LASIX) 40 mg tablet 20 mg. No current facility-administered medications on file prior to visit. CARDIOLOGY STUDIES TO DATE:  4/21 mild tr, otherwise normal echo  7/21 negative lexiscan    Chief Complaint   Patient presents with    Hypertension     1mo f/u; Pt c/o SOB w/exertion, Bilat leg/ankle swelling, Denied other cardiac symptoms     HPI :  She is doing really well from a cardiac standpoint with no worrisome symptoms. Before she got her shoulder operated on she was using compression stockings and that really helped with her dependent edema.   She still has shortness of breath but overall things are much better now that she is consistently on BiPAP. Her blood pressure is a little elevated today which is unusual for her and its probably because she is having a fair amount of pain related to her surgery. CARDIAC ROS:   negative for chest pain, palpitations, syncope, orthopnea, paroxysmal nocturnal dyspnea, exertional chest pressure/discomfort, claudication    Family History   Problem Relation Age of Onset    Cancer Mother 61        Breast cancer    Breast Cancer Mother 48    Heart Disease Father 50        M. Deyanira Siegel Asthma Sister     Heart Disease Brother     Hypertension Brother     Hypertension Brother     Diabetes Brother     Lung Disease Brother         COPD       Past Medical History:   Diagnosis Date    Chronic pelvic pain in female 7/9/2014    Depression     [facility] [address]     Hypertension     Obesity (BMI 35.0-39.9 without comorbidity)     SHAGUFTA on CPAP 3/9/2017    Posttraumatic stress disorder     Psychiatric disorder     depression    Recurrent major depression ([facility] [facility] 7/27/2010    S/P ventral herniorrhaphy 2/1/2018    Suicidal thoughts     Thyroid nodule 10/1/2015    Unspecified sleep apnea     uses cpap       GENERAL ROS:  A comprehensive review of systems was negative except for that written in the HPI. Visit Vitals  BP (!) 155/86 (BP 1 Location: Right arm, BP Patient Position: Sitting, BP Cuff Size: Large adult) Comment (BP 1 Location):  Unable to use L arm d/t sling/surgery   Pulse 86   Resp 18   Ht 5' (1.524 m)   Wt 252 lb (114.3 kg)   LMP 07/20/2014   SpO2 98%   BMI 49.22 kg/m²       Wt Readings from Last 3 Encounters:   08/12/21 252 lb (114.3 kg)   08/03/21 249 lb (112.9 kg)   07/15/21 243 lb (110.2 kg)            BP Readings from Last 3 Encounters:   08/12/21 (!) 155/86   08/03/21 (!) 150/78   07/15/21 (!) 143/85       PHYSICAL EXAM  General appearance: alert, cooperative, no distress, appears stated age  Neurologic: Alert and oriented X 3  Neck: supple, symmetrical, trachea midline, no adenopathy, no carotid bruit and no JVD  Lungs: clear to auscultation bilaterally  Heart: regular rate and rhythm, S1, S2 normal, no murmur, click, rub or gallop  Extremities: edema 1+    Lab Results   Component Value Date/Time    Cholesterol, total 127 02/18/2013 05:53 AM    Cholesterol, total 166 08/11/2012 05:20 AM    HDL Cholesterol 37 02/18/2013 05:53 AM    HDL Cholesterol 31 08/11/2012 05:20 AM    LDL, calculated 68.8 02/18/2013 05:53 AM    LDL, calculated 96.2 08/11/2012 05:20 AM    Triglyceride 106 02/18/2013 05:53 AM    Triglyceride 194 (H) 08/11/2012 05:20 AM    CHOL/HDL Ratio 3.4 02/18/2013 05:53 AM    CHOL/HDL Ratio 5.4 (H) 08/11/2012 05:20 AM     ASSESSMENT :      She is stable and I think asymptomatic from a cardiac standpoint on a good medical regimen and needs no cardiac testing. We went over her recent stress test results were all normal.  She will monitor her blood pressure at home. current treatment plan is effective, no change in therapy  lab results and schedule of future lab studies reviewed with patient  reviewed diet, exercise and weight control    Encounter Diagnoses   Name Primary?  Essential hypertension Yes    SHAGUFTA on CPAP     Obesity, morbid (HCC)     Leg swelling     Shortness of breath     Other emphysema (HCC)      Orders Placed This Encounter    ferrous sulfate 324 mg (65 mg iron) tablet       Follow-up and Dispositions    · Return in about 4 months (around 12/12/2021). Vandana Stokes MD  8/12/2021  Please note that this dictation was completed with [employer], the computer voice recognition software. Quite often unanticipated grammatical, syntax, homophones, and other interpretive errors are inadvertently transcribed by the computer software. Please disregard these errors. Please excuse any errors that have escaped final proofreading. Thank you.

## 2021-10-23 ENCOUNTER — HOSPITAL ENCOUNTER (EMERGENCY)
Age: 56
Discharge: HOME | End: 2021-10-23
Attending: EMERGENCY MEDICINE
Payer: MEDICARE

## 2021-10-23 VITALS
HEART RATE: 92 BPM | HEIGHT: 60 IN | SYSTOLIC BLOOD PRESSURE: 175 MMHG | OXYGEN SATURATION: 99 % | WEIGHT: 250 LBS | RESPIRATION RATE: 16 BRPM | TEMPERATURE: 98.4 F | DIASTOLIC BLOOD PRESSURE: 92 MMHG | BODY MASS INDEX: 49.08 KG/M2

## 2021-10-23 DIAGNOSIS — R73.9 HYPERGLYCEMIA: Primary | ICD-10-CM

## 2021-10-23 DIAGNOSIS — B37.31 VAGINAL YEAST INFECTION: ICD-10-CM

## 2021-10-23 LAB
APPEARANCE UR: CLEAR
BACTERIA URNS QL MICRO: NEGATIVE /HPF
BILIRUB UR QL: NEGATIVE
COLOR UR: ABNORMAL
EPITH CASTS URNS QL MICRO: ABNORMAL /LPF
GLUCOSE BLD STRIP.AUTO-MCNC: 274 MG/DL (ref 65–117)
GLUCOSE UR STRIP.AUTO-MCNC: 500 MG/DL
HGB UR QL STRIP: NEGATIVE
KETONES UR QL STRIP.AUTO: NEGATIVE MG/DL
KOH PREP SPEC: NORMAL
LEUKOCYTE ESTERASE UR QL STRIP.AUTO: NEGATIVE
NITRITE UR QL STRIP.AUTO: NEGATIVE
PH UR STRIP: 6.5 [PH] (ref 5–8)
PROT UR STRIP-MCNC: NEGATIVE MG/DL
RBC #/AREA URNS HPF: ABNORMAL /HPF (ref 0–5)
SERVICE CMNT-IMP: ABNORMAL
SERVICE CMNT-IMP: NORMAL
SP GR UR REFRACTOMETRY: 1.01 (ref 1–1.03)
UA: UC IF INDICATED,UAUC: ABNORMAL
UROBILINOGEN UR QL STRIP.AUTO: 1 EU/DL (ref 0.2–1)
WBC URNS QL MICRO: ABNORMAL /HPF (ref 0–4)
YEAST BUDDING URNS QL: PRESENT

## 2021-10-23 PROCEDURE — 99284 EMERGENCY DEPT VISIT MOD MDM: CPT

## 2021-10-23 PROCEDURE — 87210 SMEAR WET MOUNT SALINE/INK: CPT

## 2021-10-23 PROCEDURE — 82962 GLUCOSE BLOOD TEST: CPT

## 2021-10-23 PROCEDURE — 81001 URINALYSIS AUTO W/SCOPE: CPT

## 2021-10-23 RX ORDER — FLUCONAZOLE 150 MG/1
150 TABLET ORAL
Qty: 1 TABLET | Refills: 1 | Status: SHIPPED | OUTPATIENT
Start: 2021-10-23 | End: 2021-10-23

## 2021-10-23 NOTE — ED NOTES
Patient here with c/o elevate blood glucose, and c/o yeast infection. Patient states symptoms x3 days. Patient states that her BG has been in the 340s, states that her normal is usually between 180-230. Patient denies changes to diet. Patient denies fevers. Patient denies changes to home medications, states she is very compliant. Emergency Department Nursing Plan of Care       The Nursing Plan of Care is developed from the Nursing assessment and Emergency Department Attending provider initial evaluation. The plan of care may be reviewed in the ED Provider note.     The Plan of Care was developed with the following considerations:   Patient / Family readiness to learn indicated by:verbalized understanding  Persons(s) to be included in education: patient  Barriers to Learning/Limitations:No    Signed     Liliana Perez RN    10/23/2021   7:23 PM

## 2021-10-23 NOTE — ED PROVIDER NOTES
EMERGENCY DEPARTMENT HISTORY AND PHYSICAL EXAM      Date: 10/23/2021  Patient Name: Gelacio Kenney    History of Presenting Illness     Chief Complaint   Patient presents with    High Blood Sugar     x3 days, BG's in 345 range, cant get it down with novolog lantu and metformin, states now has blurry vision    Yeast Infection     and increased urinary frequency     History Provided By: Patient    HPI: Gelacio Kenney, 54 y.o. female with past medical history significant for diabetes, depression, hypertension, obstructive sleep apnea, and thyroid nodules who presents via private vehicle to the ED with cc of elevated blood glucose for the past 3 days, blurry vision, vaginal itching/irritation, and urinary frequency. Patient denies any dysuria or hematuria. Her sugar normally runs in the 1  range and for the last 3 days has been running between 304 100. She denies any change in her medications or diet. She believes she has a yeast infection or urinary tract infection. She did take a Diflucan tablet around 1:00 this afternoon but denies any change in her symptoms as of yet. She denies any nausea, vomiting, diarrhea, fevers, or chills. Patient is on NovoLog, Lantus, and Metformin for her diabetes. PMHx: Diabetes, depression, hypertension, obstructive sleep apnea, thyroid nodules  Social Hx: Former smoker, denies alcohol use, denies illegal drug use    PCP: Karen Mg MD    There are no other complaints, changes, or physical findings at this time. No current facility-administered medications on file prior to encounter. Current Outpatient Medications on File Prior to Encounter   Medication Sig Dispense Refill    NovoLOG Flexpen U-100 Insulin 100 unit/mL (3 mL) inpn INSTILL 10 UNITS 3X A DAY BEFORE MEALS      Lantus Solostar U-100 Insulin 100 unit/mL (3 mL) inpn 40 Units nightly.       metFORMIN (GLUCOPHAGE) 1,000 mg tablet take 1 tablet by mouth twice a day      ferrous sulfate 324 mg (65 mg iron) tablet take 1 tablet by mouth once daily      True Metrix Glucose Test Strip strip       budesonide-formoteroL (SYMBICORT) 80-4.5 mcg/actuation HFAA inhale 2 puffs by mouth twice a day Rinse mouth after use      dextroamphetamine-amphetamine (ADDERALL) 20 mg tablet take 1 tablet by mouth every morning      diclofenac (VOLTAREN) 1 % gel as needed.  Vitamin D2 1,250 mcg (50,000 unit) capsule take 1 capsule by mouth every week      furosemide (LASIX) 40 mg tablet 20 mg.      methocarbamoL (ROBAXIN) 500 mg tablet three (3) times daily as needed.  methylphenidate HCl (RITALIN) 10 mg tablet take 1 tablet by mouth once daily if needed AROUND NOON      oxyCODONE-acetaminophen (PERCOCET) 5-325 mg per tablet take 1 tablet by mouth twice a day if needed for chronic pain      ARIPiprazole (Abilify) 5 mg tablet Take 10 mg by mouth daily.  omeprazole (PRILOSEC) 40 mg capsule Take 40 mg by mouth daily.  topiramate (TOPAMAX) 50 mg tablet Take 50 mg by mouth nightly.  buPROPion XL (WELLBUTRIN XL) 150 mg tablet Take 300 mg by mouth daily.  metoprolol succinate (TOPROL-XL) 25 mg XL tablet Take 1 Tab by mouth daily. 30 Tab 3    atorvastatin (LIPITOR) 20 mg tablet  20 mg = 1 tab each dose, PO, bedtime, 0 Refills      albuterol (PROVENTIL HFA, VENTOLIN HFA) 90 mcg/actuation inhaler Take 2 Puffs by inhalation every four (4) hours as needed.        Past History     Past Medical History:  Past Medical History:   Diagnosis Date    Chronic pelvic pain in female 7/9/2014    Depression     [facility] [address] [address]     Hypertension     Obesity (BMI 35.0-39.9 without comorbidity)     SHAGUFTA on CPAP 3/9/2017    Posttraumatic stress disorder     Psychiatric disorder     depression    Recurrent major depression ([address] 7/27/2010    S/P ventral herniorrhaphy 2/1/2018    Suicidal thoughts     Thyroid nodule 10/1/2015    Unspecified sleep apnea     uses cpap     Past Surgical History:  Past Surgical History:   Procedure Laterality Date    HX  SECTION      HX  SECTION      HX HEENT      head and neck surgery     HX HERNIA REPAIR  2018    incarcerated ventral hernia repair with mesh    HX HYSTERECTOMY      HX ORTHOPAEDIC      L ankle surgery     HX ORTHOPAEDIC Left     TOTAL HIP REPLACEMENT    HX ROTATOR CUFF REPAIR Left     2021    HX TUBAL LIGATION      PPBTL    HI ABDOMEN SURGERY PROC UNLISTED      cholecystectomy     Family History:  Family History   Problem Relation Age of Onset    Cancer Mother 61        Breast cancer   Cornelia Me Breast Cancer Mother 48    Heart Disease Father 50        M. I.   Cornelia Me Asthma Sister     Heart Disease Brother     Hypertension Brother     Hypertension Brother     Diabetes Brother     Lung Disease Brother         COPD     Social History:  Social History     Tobacco Use    Smoking status: Former Smoker     Packs/day: 2.00     Years: 30.00     Pack years: 60.00     Types: Cigarettes     Quit date: 2021     Years since quittin.7    Smokeless tobacco: Never Used   Vaping Use    Vaping Use: Never used   Substance Use Topics    Alcohol use: Never    Drug use: Never     Allergies: Allergies   Allergen Reactions    Zantac [Ranitidine Hcl] Hives    Zantac [Ranitidine Hcl] Hives    Lisinopril Angioedema     Review of Systems   Review of Systems   Constitutional: Negative for chills and fever. HENT: Negative for congestion, rhinorrhea, sneezing and sore throat. Eyes: Positive for visual disturbance (blurry vision). Negative for redness. Respiratory: Negative for shortness of breath. Cardiovascular: Negative for leg swelling. Gastrointestinal: Negative for abdominal pain, nausea and vomiting. Endocrine: Positive for polyuria. Genitourinary: Positive for frequency. Negative for difficulty urinating, vaginal bleeding and vaginal discharge.         Vaginal itching   Musculoskeletal: Negative for back pain, myalgias and neck stiffness. Skin: Negative for rash. Neurological: Negative for dizziness, syncope, weakness and headaches. Hematological: Negative for adenopathy. All other systems reviewed and are negative. Physical Exam   Physical Exam  Vitals and nursing note reviewed. Constitutional:       Appearance: Normal appearance. She is well-developed. She is obese. HENT:      Head: Normocephalic and atraumatic. Eyes:      Conjunctiva/sclera: Conjunctivae normal.   Cardiovascular:      Rate and Rhythm: Normal rate and regular rhythm. Pulses: Normal pulses. Heart sounds: Normal heart sounds, S1 normal and S2 normal. No murmur heard. Pulmonary:      Effort: Pulmonary effort is normal. No respiratory distress. Breath sounds: Normal breath sounds. No wheezing. Abdominal:      General: Bowel sounds are normal. There is no distension. Palpations: Abdomen is soft. Tenderness: There is no abdominal tenderness. There is no rebound. Musculoskeletal:         General: Normal range of motion. Cervical back: Full passive range of motion without pain, normal range of motion and neck supple. Skin:     General: Skin is warm and dry. Findings: No rash. Neurological:      Mental Status: She is alert and oriented to person, place, and time. Psychiatric:         Speech: Speech normal.         Behavior: Behavior normal.         Thought Content:  Thought content normal.         Judgment: Judgment normal.       Diagnostic Study Results   Labs -     Recent Results (from the past 12 hour(s))   GLUCOSE, POC    Collection Time: 10/23/21  4:35 PM   Result Value Ref Range    Glucose (POC) 274 (H) 65 - 117 mg/dL    Performed by [employer]    URINALYSIS W/ REFLEX CULTURE    Collection Time: 10/23/21  6:17 PM    Specimen: Urine   Result Value Ref Range    Color YELLOW/STRAW      Appearance CLEAR CLEAR      Specific gravity 1.010 1.003 - 1.030      pH (UA) 6.5 5.0 - 8.0      Protein Negative NEG mg/dL    Glucose 500 (A) NEG mg/dL    Ketone Negative NEG mg/dL    Bilirubin Negative NEG      Blood Negative NEG      Urobilinogen 1.0 0.2 - 1.0 EU/dL    Nitrites Negative NEG      Leukocyte Esterase Negative NEG      WBC 0-4 0 - 4 /hpf    RBC 0-5 0 - 5 /hpf    Epithelial cells FEW FEW /lpf    Bacteria Negative NEG /hpf    UA:UC IF INDICATED CULTURE NOT INDICATED BY UA RESULT CNI      Budding yeast PRESENT (A) NEG     KOH, OTHER SOURCES    Collection Time: 10/23/21  6:17 PM    Specimen: Vaginal Specimen; Other   Result Value Ref Range    Special Requests: NO SPECIAL REQUESTS      KOH YEAST         Radiologic Studies -   No orders to display     No results found. Medical Decision Making   I am the first provider for this patient. I reviewed the vital signs, available nursing notes, past medical history, past surgical history, family history and social history. Vital Signs-Reviewed the patient's vital signs. Patient Vitals for the past 24 hrs:   Temp Pulse Resp BP SpO2   10/23/21 1629 98.4 °F (36.9 °C) 92 16 (!) 175/92 99 %     Pulse Oximetry Analysis - 99% on RA (normal)    Records Reviewed: Nursing Notes, Old Medical Records and Previous Laboratory Studies    Provider Notes (Medical Decision Making):   78-year-old female presents with vaginal itching, urinary frequency, blurry vision, and elevated blood glucose for the past 3 days. Differential includes UTI, yeast vaginitis, hyperglycemia, and electrolyte abnormality. Will check a point-of-care glucose, send a KOH swab, and send a urinalysis to assess for infection. Patient has already taken a Diflucan tablet around 1:00 this afternoon. Will reassess after her results are back. ED Course:   Initial assessment performed. The patients presenting problems have been discussed, and they are in agreement with the care plan formulated and outlined with them. I have encouraged them to ask questions as they arise throughout their visit.     Patient's UA and swab are both positive for yeast.  She did take a Diflucan this afternoon around 1 PM.  Will discharge with an additional dose of Diflucan if her symptoms do not improve in the next 3 to 5 days. Progress Note:   Updated pt on all returned results and findings. Discussed the importance of proper follow up as referred below along with return precautions. Pt in agreement with the care plan and expresses agreement with and understanding of all items discussed. Disposition:  Discharge Note:  The pt is ready for discharge. The pt's signs, symptoms, diagnosis, and discharge instructions have been discussed and pt has conveyed their understanding. The pt is to follow up as recommended or return to ER should their symptoms worsen. Plan has been discussed and pt is in agreement. PLAN:  1. Current Discharge Medication List      START taking these medications    Details   fluconazole (Diflucan) 150 mg tablet Take 1 Tablet by mouth now for 1 dose. Repeat in 5 days if no improvement  Qty: 1 Tablet, Refills: 1  Start date: 10/23/2021, End date: 10/23/2021           2. Follow-up Information     Follow up With Specialties Details Why Contact Info    Christy Real MD Family Medicine Schedule an appointment as soon as possible for a visit   [address] [place] [address]  [phone]      [address] [place] [address] DEPT Emergency Medicine  As needed, If symptoms worsen [facility] [place]  [phone]        Return to ED if worse     Diagnosis     Clinical Impression:   1. Hyperglycemia    2. Vaginal yeast infection            Please note that this dictation was completed with Dragon, computer voice recognition software. Quite often unanticipated grammatical, syntax, homophones, and other interpretive errors are inadvertently transcribed by the computer software. Please disregard these errors. Additionally, please excuse any errors that have escaped final proofreading.

## 2021-11-17 ENCOUNTER — TRANSCRIBE ORDER (OUTPATIENT)
Dept: SCHEDULING | Age: 56
End: 2021-11-17

## 2021-11-17 DIAGNOSIS — R06.02 SHORTNESS OF BREATH: Primary | ICD-10-CM

## 2021-11-18 ENCOUNTER — TRANSCRIBE ORDER (OUTPATIENT)
Dept: SCHEDULING | Age: 56
End: 2021-11-18

## 2021-11-18 DIAGNOSIS — R06.02 SHORTNESS OF BREATH: Primary | ICD-10-CM

## 2021-11-23 ENCOUNTER — HOSPITAL ENCOUNTER (OUTPATIENT)
Dept: CT IMAGING | Age: 56
Discharge: HOME | End: 2021-11-23
Attending: INTERNAL MEDICINE
Payer: MEDICARE

## 2021-11-23 DIAGNOSIS — R06.02 SHORTNESS OF BREATH: ICD-10-CM

## 2021-11-23 PROCEDURE — 71271 CT THORAX LUNG CANCER SCR C-: CPT

## 2021-12-14 ENCOUNTER — OFFICE VISIT (OUTPATIENT)
Dept: CARDIOLOGY CLINIC | Age: 56
End: 2021-12-14
Payer: MEDICARE

## 2021-12-14 VITALS
SYSTOLIC BLOOD PRESSURE: 152 MMHG | HEART RATE: 83 BPM | BODY MASS INDEX: 50.26 KG/M2 | DIASTOLIC BLOOD PRESSURE: 90 MMHG | RESPIRATION RATE: 19 BRPM | WEIGHT: 256 LBS | OXYGEN SATURATION: 100 % | HEIGHT: 60 IN

## 2021-12-14 DIAGNOSIS — Z99.89 OSA ON CPAP: ICD-10-CM

## 2021-12-14 DIAGNOSIS — M79.89 LEG SWELLING: ICD-10-CM

## 2021-12-14 DIAGNOSIS — J43.8 OTHER EMPHYSEMA (HCC): ICD-10-CM

## 2021-12-14 DIAGNOSIS — G47.33 OSA ON CPAP: ICD-10-CM

## 2021-12-14 DIAGNOSIS — I10 ESSENTIAL HYPERTENSION: Primary | ICD-10-CM

## 2021-12-14 PROCEDURE — 3017F COLORECTAL CA SCREEN DOC REV: CPT | Performed by: SPECIALIST

## 2021-12-14 PROCEDURE — G8427 DOCREV CUR MEDS BY ELIG CLIN: HCPCS | Performed by: SPECIALIST

## 2021-12-14 PROCEDURE — G8417 CALC BMI ABV UP PARAM F/U: HCPCS | Performed by: SPECIALIST

## 2021-12-14 PROCEDURE — 99214 OFFICE O/P EST MOD 30 MIN: CPT | Performed by: SPECIALIST

## 2021-12-14 PROCEDURE — G8753 SYS BP > OR = 140: HCPCS | Performed by: SPECIALIST

## 2021-12-14 PROCEDURE — G8755 DIAS BP > OR = 90: HCPCS | Performed by: SPECIALIST

## 2021-12-14 PROCEDURE — G9717 DOC PT DX DEP/BP F/U NT REQ: HCPCS | Performed by: SPECIALIST

## 2021-12-14 RX ORDER — LOSARTAN POTASSIUM 50 MG/1
50 TABLET ORAL DAILY
Qty: 30 TABLET | Refills: 5 | Status: SHIPPED | OUTPATIENT
Start: 2021-12-14 | End: 2022-04-19

## 2021-12-14 RX ORDER — ATORVASTATIN CALCIUM 20 MG/1
20 TABLET, FILM COATED ORAL DAILY
Qty: 90 TABLET | Refills: 1 | Status: SHIPPED | OUTPATIENT
Start: 2021-12-14 | End: 2022-04-19 | Stop reason: SDUPTHER

## 2021-12-14 NOTE — PROGRESS NOTES
HISTORY OF PRESENT ILLNESS  Loi Ross is a 64 y.o. female     SUMMARY:   Problem List  Date Reviewed: 2021          Codes Class Noted    Tachypnea ICD-10-CM: R06.82  ICD-9-CM: 786.06  2021        Hypokalemia ICD-10-CM: E87.6  ICD-9-CM: 276.8  2021        Difficulty swallowing ICD-10-CM: R13.10  ICD-9-CM: 787.20  2021        Pneumonia due to COVID-19 virus ICD-10-CM: U07.1, J12.82  ICD-9-CM: 480.8, 079.89  2021        Class 3 severe obesity with body mass index (BMI) of 40.0 to 44.9 in adult [facility]) ICD-10-CM: E66.01, Z68.41  ICD-9-CM: 278.01, V85.41  2020        S/P ventral herniorrhaphy ICD-10-CM: Z98.890, Z87.19  ICD-9-CM: V45.89  2018        Obesity, morbid ([address] ICD-10-CM: E66.01  ICD-9-CM: 278.01  2017        SHAGUFTA on CPAP ICD-10-CM: G47.33, Z99.89  ICD-9-CM: 327.23, V46.8  3/9/2017        Essential hypertension ICD-10-CM: I10  ICD-9-CM: 401.9  3/9/2017        Smoker ICD-10-CM: F17.200  ICD-9-CM: 305.1  3/9/2017        Thyroid nodule ICD-10-CM: E04.1  ICD-9-CM: 241.0  10/1/2015        Granulation tissue of vaginal cuff ICD-10-CM: N89.8  ICD-9-CM: 623.8  2014        H/O:  ICD-10-CM: Z98.891  ICD-9-CM: V45.89  2014        H/O tubal ligation ICD-10-CM: Z98.51  ICD-9-CM: V26.51  2014        Obesity (BMI 35.0-39.9 without comorbidity) ICD-10-CM: E66.9  ICD-9-CM: 278.00  Unknown        Posttraumatic stress disorder ICD-10-CM: F43.10  ICD-9-CM: 309.81  8/10/2012        Recurrent major depression ([facility] ICD-10-CM: F33.9  ICD-9-CM: 296.30  2010              Current Outpatient Medications on File Prior to Visit   Medication Sig    ferrous sulfate 324 mg (65 mg iron) tablet take 1 tablet by mouth once daily    True Metrix Glucose Test Strip strip     budesonide-formoteroL (SYMBICORT) 80-4.5 mcg/actuation HFAA inhale 2 puffs by mouth twice a day Rinse mouth after use    dextroamphetamine-amphetamine (ADDERALL) 20 mg tablet take 1 tablet by mouth every morning    diclofenac (VOLTAREN) 1 % gel as needed.  Vitamin D2 1,250 mcg (50,000 unit) capsule take 1 capsule by mouth every week    furosemide (LASIX) 40 mg tablet Take 20 mg by mouth daily.  NovoLOG Flexpen U-100 Insulin 100 unit/mL (3 mL) inpn INSTILL 10 UNITS 3X A DAY BEFORE MEALS    Lantus Solostar U-100 Insulin 100 unit/mL (3 mL) inpn 40 Units nightly.  metFORMIN (GLUCOPHAGE) 1,000 mg tablet take 1 tablet by mouth twice a day    methocarbamoL (ROBAXIN) 500 mg tablet three (3) times daily as needed.  methylphenidate HCl (RITALIN) 10 mg tablet take 1 tablet by mouth once daily if needed AROUND NOON    oxyCODONE-acetaminophen (PERCOCET) 5-325 mg per tablet take 1 tablet by mouth twice a day if needed for chronic pain    ARIPiprazole (Abilify) 5 mg tablet Take 10 mg by mouth daily.  omeprazole (PRILOSEC) 40 mg capsule Take 40 mg by mouth daily.  topiramate (TOPAMAX) 50 mg tablet Take 50 mg by mouth nightly.  buPROPion XL (WELLBUTRIN XL) 150 mg tablet Take 300 mg by mouth daily.  metoprolol succinate (TOPROL-XL) 25 mg XL tablet Take 1 Tab by mouth daily.  albuterol (PROVENTIL HFA, VENTOLIN HFA) 90 mcg/actuation inhaler Take 2 Puffs by inhalation every four (4) hours as needed. No current facility-administered medications on file prior to visit. CARDIOLOGY STUDIES TO DATE:  4/21 mild tr, otherwise normal echo  7/21 negative lexiscan    Chief Complaint   Patient presents with    Follow-up     4 mo appt.  Shortness of Breath    Leg Swelling     bilateral     HPI :  She has been having some trouble with elevated blood pressure recently. She still has shortness of breath and some dependent edema which is all old. She is wearing her BiPAP faithfully and it sounds like her sugars are very poorly controlled. She had been on lisinopril in the past but apparently was allergic to it and the only thing she is really on for blood pressure now is metoprolol. Primary care is following her lipids. CARDIAC ROS:   negative for chest pain, palpitations, syncope, orthopnea, paroxysmal nocturnal dyspnea, exertional chest pressure/discomfort, claudication    Family History   Problem Relation Age of Onset    Cancer Mother 61        Breast cancer    Breast Cancer Mother 48    Heart Disease Father 50        M. Mavis Melgar Asthma Sister     Heart Disease Brother     Hypertension Brother     Hypertension Brother     Diabetes Brother     Lung Disease Brother         COPD       Past Medical History:   Diagnosis Date    Chronic pelvic pain in female 7/9/2014    Depression     Diabetes ([facility] [address]     Hypertension     Obesity (BMI 35.0-39.9 without comorbidity)     SHAGUFTA on CPAP 3/9/2017    Posttraumatic stress disorder     Psychiatric disorder     depression    Recurrent major depression ([facility] [address] 7/27/2010    S/P ventral herniorrhaphy 2/1/2018    Suicidal thoughts     Thyroid nodule 10/1/2015    Unspecified sleep apnea     uses cpap       GENERAL ROS:  A comprehensive review of systems was negative except for: Neurological: positive for headaches    Visit Vitals  BP (!) 152/90 (BP 1 Location: Right upper arm, BP Patient Position: Sitting, BP Cuff Size: Large adult)   Pulse 83   Resp 19   Ht 5' (1.524 m)   Wt 256 lb (116.1 kg)   LMP 07/20/2014   SpO2 100%   BMI 50.00 kg/m²       Wt Readings from Last 3 Encounters:   12/14/21 256 lb (116.1 kg)   10/23/21 250 lb (113.4 kg)   08/12/21 252 lb (114.3 kg)            BP Readings from Last 3 Encounters:   12/14/21 (!) 152/90   10/23/21 (!) 175/92   08/12/21 (!) 155/86       PHYSICAL EXAM  General appearance: alert, cooperative, no distress, appears stated age  Neurologic: Alert and oriented X 3  Neck: supple, symmetrical, trachea midline, no adenopathy, no carotid bruit and no JVD  Lungs: clear to auscultation bilaterally  Heart: regular rate and rhythm, S1, S2 normal, no murmur, click, rub or gallop  Extremities: edema tr    Lab Results Component Value Date/Time    Cholesterol, total 127 02/18/2013 05:53 AM    Cholesterol, total 166 08/11/2012 05:20 AM    HDL Cholesterol 37 02/18/2013 05:53 AM    HDL Cholesterol 31 08/11/2012 05:20 AM    LDL, calculated 68.8 02/18/2013 05:53 AM    LDL, calculated 96.2 08/11/2012 05:20 AM    Triglyceride 106 02/18/2013 05:53 AM    Triglyceride 194 (H) 08/11/2012 05:20 AM    CHOL/HDL Ratio 3.4 02/18/2013 05:53 AM    CHOL/HDL Ratio 5.4 (H) 08/11/2012 05:20 AM     ASSESSMENT :      She is stable and I think asymptomatic from a cardiac standpoint. We done a stress test and an echo on her in the last year both of which looked good so I reassured her in that regard in terms of the shortness of breath and edema. Working to add losartan 50 mg a day for blood pressure and renal protection and we talked about side effects. She needs no further cardiac testing at this time. current treatment plan is effective, no change in therapy  lab results and schedule of future lab studies reviewed with patient  reviewed diet, exercise and weight control    Encounter Diagnoses   Name Primary?  Essential hypertension Yes    SHAGUFTA on CPAP     Other emphysema (HCC)     Leg swelling      Orders Placed This Encounter    atorvastatin (LIPITOR) 20 mg tablet    losartan (COZAAR) 50 mg tablet       Follow-up and Dispositions    · Return in about 4 months (around 4/14/2022). Cecy Worthy MD  12/14/2021  Please note that this dictation was completed with [employer], the computer voice recognition software. Quite often unanticipated grammatical, syntax, homophones, and other interpretive errors are inadvertently transcribed by the computer software. Please disregard these errors. Please excuse any errors that have escaped final proofreading. Thank you.

## 2021-12-14 NOTE — PROGRESS NOTES
1. Have you been to the ER, urgent care clinic since your last visit? Hospitalized since your last visit? 10/23/21 [facility] ED d/t high blood sugar and yeast infection. 2. Have you seen or consulted any other health care providers outside of the [address] [place] [address] since your last visit? Include any pap smears or colon screening. No      Chief Complaint   Patient presents with    Follow-up     4 mo appt.     Shortness of Breath    Leg Swelling     bilateral     Per patient, \"My BP has been high since Saturday, been having headaches\"

## 2022-03-16 ENCOUNTER — TRANSCRIBE ORDER (OUTPATIENT)
Dept: SCHEDULING | Age: 57
End: 2022-03-16

## 2022-03-16 DIAGNOSIS — R06.02 SHORTNESS OF BREATH: Primary | ICD-10-CM

## 2022-03-18 PROBLEM — F17.200 SMOKER: Status: ACTIVE | Noted: 2017-03-09

## 2022-03-18 PROBLEM — E66.01 OBESITY, MORBID (HCC): Status: ACTIVE | Noted: 2017-12-27

## 2022-03-18 PROBLEM — Z99.89 OSA ON CPAP: Status: ACTIVE | Noted: 2017-03-09

## 2022-03-18 PROBLEM — G47.33 OSA ON CPAP: Status: ACTIVE | Noted: 2017-03-09

## 2022-03-19 PROBLEM — J12.82 PNEUMONIA DUE TO COVID-19 VIRUS: Status: ACTIVE | Noted: 2021-01-24

## 2022-03-19 PROBLEM — E66.813 CLASS 3 SEVERE OBESITY WITH BODY MASS INDEX (BMI) OF 40.0 TO 44.9 IN ADULT: Status: ACTIVE | Noted: 2020-01-25

## 2022-03-19 PROBLEM — E66.01 CLASS 3 SEVERE OBESITY WITH BODY MASS INDEX (BMI) OF 40.0 TO 44.9 IN ADULT (HCC): Status: ACTIVE | Noted: 2020-01-25

## 2022-03-19 PROBLEM — Z98.890 S/P VENTRAL HERNIORRHAPHY: Status: ACTIVE | Noted: 2018-02-01

## 2022-03-19 PROBLEM — R06.82 TACHYPNEA: Status: ACTIVE | Noted: 2021-01-24

## 2022-03-19 PROBLEM — Z87.19 S/P VENTRAL HERNIORRHAPHY: Status: ACTIVE | Noted: 2018-02-01

## 2022-03-19 PROBLEM — U07.1 PNEUMONIA DUE TO COVID-19 VIRUS: Status: ACTIVE | Noted: 2021-01-24

## 2022-03-20 PROBLEM — I10 ESSENTIAL HYPERTENSION: Status: ACTIVE | Noted: 2017-03-09

## 2022-03-20 PROBLEM — R13.10 DIFFICULTY SWALLOWING: Status: ACTIVE | Noted: 2021-01-24

## 2022-03-20 PROBLEM — E87.6 HYPOKALEMIA: Status: ACTIVE | Noted: 2021-01-24

## 2022-03-24 ENCOUNTER — TRANSCRIBE ORDER (OUTPATIENT)
Dept: SCHEDULING | Age: 57
End: 2022-03-24

## 2022-03-24 DIAGNOSIS — Z87.891 FORMER SMOKER: Primary | ICD-10-CM

## 2022-04-19 ENCOUNTER — OFFICE VISIT (OUTPATIENT)
Dept: CARDIOLOGY CLINIC | Age: 57
End: 2022-04-19
Payer: MEDICARE

## 2022-04-19 VITALS
DIASTOLIC BLOOD PRESSURE: 84 MMHG | SYSTOLIC BLOOD PRESSURE: 150 MMHG | OXYGEN SATURATION: 99 % | TEMPERATURE: 97 F | HEART RATE: 82 BPM | WEIGHT: 250.4 LBS | BODY MASS INDEX: 49.16 KG/M2 | RESPIRATION RATE: 16 BRPM | HEIGHT: 60 IN

## 2022-04-19 DIAGNOSIS — G47.33 OSA ON CPAP: ICD-10-CM

## 2022-04-19 DIAGNOSIS — E66.01 OBESITY, MORBID (HCC): ICD-10-CM

## 2022-04-19 DIAGNOSIS — I10 ESSENTIAL HYPERTENSION: Primary | ICD-10-CM

## 2022-04-19 DIAGNOSIS — Z99.89 OSA ON CPAP: ICD-10-CM

## 2022-04-19 DIAGNOSIS — J43.8 OTHER EMPHYSEMA (HCC): ICD-10-CM

## 2022-04-19 DIAGNOSIS — M79.89 LEG SWELLING: ICD-10-CM

## 2022-04-19 PROCEDURE — 3017F COLORECTAL CA SCREEN DOC REV: CPT | Performed by: SPECIALIST

## 2022-04-19 PROCEDURE — G8754 DIAS BP LESS 90: HCPCS | Performed by: SPECIALIST

## 2022-04-19 PROCEDURE — G8417 CALC BMI ABV UP PARAM F/U: HCPCS | Performed by: SPECIALIST

## 2022-04-19 PROCEDURE — G8427 DOCREV CUR MEDS BY ELIG CLIN: HCPCS | Performed by: SPECIALIST

## 2022-04-19 PROCEDURE — 99214 OFFICE O/P EST MOD 30 MIN: CPT | Performed by: SPECIALIST

## 2022-04-19 PROCEDURE — G9717 DOC PT DX DEP/BP F/U NT REQ: HCPCS | Performed by: SPECIALIST

## 2022-04-19 PROCEDURE — G8753 SYS BP > OR = 140: HCPCS | Performed by: SPECIALIST

## 2022-04-19 RX ORDER — CYCLOBENZAPRINE HCL 10 MG
TABLET ORAL 3 TIMES DAILY
Status: ON HOLD | COMMUNITY
End: 2022-11-01 | Stop reason: CLARIF

## 2022-04-19 RX ORDER — SEMAGLUTIDE 1.34 MG/ML
INJECTION, SOLUTION SUBCUTANEOUS
COMMUNITY
Start: 2022-04-11

## 2022-04-19 RX ORDER — LOSARTAN POTASSIUM 100 MG/1
100 TABLET ORAL DAILY
Qty: 90 TABLET | Refills: 3 | Status: SHIPPED | OUTPATIENT
Start: 2022-04-19

## 2022-04-19 RX ORDER — ATORVASTATIN CALCIUM 20 MG/1
20 TABLET, FILM COATED ORAL DAILY
Qty: 90 TABLET | Refills: 2 | Status: SHIPPED | OUTPATIENT
Start: 2022-04-19

## 2022-04-19 NOTE — PROGRESS NOTES
HISTORY OF PRESENT ILLNESS  Stephani Martinez is a 64 y.o. female     SUMMARY:   Problem List  Date Reviewed: 2022          Codes Class Noted    Tachypnea ICD-10-CM: R06.82  ICD-9-CM: 786.06  2021        Hypokalemia ICD-10-CM: E87.6  ICD-9-CM: 276.8  2021        Difficulty swallowing ICD-10-CM: R13.10  ICD-9-CM: 787.20  2021        Pneumonia due to COVID-19 virus ICD-10-CM: U07.1, J12.82  ICD-9-CM: 480.8, 079.89  2021        Class 3 severe obesity with body mass index (BMI) of 40.0 to 44.9 in adult [facility]) ICD-10-CM: E66.01, Z68.41  ICD-9-CM: 278.01, V85.41  2020        S/P ventral herniorrhaphy ICD-10-CM: Z98.890, Z87.19  ICD-9-CM: V45.89  2018        Obesity, morbid ([address] ICD-10-CM: E66.01  ICD-9-CM: 278.01  2017        SHAGUFTA on CPAP ICD-10-CM: G47.33, Z99.89  ICD-9-CM: 327.23, V46.8  3/9/2017        Essential hypertension ICD-10-CM: I10  ICD-9-CM: 401.9  3/9/2017        Smoker ICD-10-CM: F17.200  ICD-9-CM: 305.1  3/9/2017        Thyroid nodule ICD-10-CM: E04.1  ICD-9-CM: 241.0  10/1/2015        Granulation tissue of vaginal cuff ICD-10-CM: N89.8  ICD-9-CM: 623.8  2014        H/O:  ICD-10-CM: Z98.891  ICD-9-CM: V45.89  2014        H/O tubal ligation ICD-10-CM: Z98.51  ICD-9-CM: V26.51  2014        Obesity (BMI 35.0-39.9 without comorbidity) ICD-10-CM: E66.9  ICD-9-CM: 278.00  Unknown        Posttraumatic stress disorder ICD-10-CM: F43.10  ICD-9-CM: 309.81  8/10/2012        Recurrent major depression ([facility] ICD-10-CM: F33.9  ICD-9-CM: 296.30  2010              Current Outpatient Medications on File Prior to Visit   Medication Sig    Ozempic 0.25 mg or 0.5 mg/dose (2 mg/1.5 ml) subq pen inject 0.25 milligrams subcutaneously every week    cyclobenzaprine (FLEXERIL) 10 mg tablet three (3) times daily.     ferrous sulfate 324 mg (65 mg iron) tablet take 1 tablet by mouth once daily    True Metrix Glucose Test Strip strip    Jeny budesonide-formoteroL (SYMBICORT) 80-4.5 mcg/actuation HFAA inhale 2 puffs by mouth twice a day Rinse mouth after use    dextroamphetamine-amphetamine (ADDERALL) 20 mg tablet take 1 tablet by mouth every morning    diclofenac (VOLTAREN) 1 % gel as needed.  Vitamin D2 1,250 mcg (50,000 unit) capsule take 1 capsule by mouth every week    furosemide (LASIX) 40 mg tablet Take 20 mg by mouth daily.  NovoLOG Flexpen U-100 Insulin 100 unit/mL (3 mL) inpn INSTILL 10 UNITS 3X A DAY BEFORE MEALS    Lantus Solostar U-100 Insulin 100 unit/mL (3 mL) inpn 60 Units nightly.  metFORMIN (GLUCOPHAGE) 1,000 mg tablet take 1 tablet by mouth twice a day    methocarbamoL (ROBAXIN) 500 mg tablet three (3) times daily as needed.  methylphenidate HCl (RITALIN) 10 mg tablet take 1 tablet by mouth once daily if needed AROUND NOON    oxyCODONE-acetaminophen (PERCOCET) 5-325 mg per tablet take 1 tablet by mouth twice a day if needed for chronic pain    ARIPiprazole (Abilify) 5 mg tablet Take 10 mg by mouth daily.  omeprazole (PRILOSEC) 40 mg capsule Take 40 mg by mouth daily.  topiramate (TOPAMAX) 50 mg tablet Take 50 mg by mouth nightly.  buPROPion XL (WELLBUTRIN XL) 150 mg tablet Take 300 mg by mouth daily.  metoprolol succinate (TOPROL-XL) 25 mg XL tablet Take 1 Tab by mouth daily.  albuterol (PROVENTIL HFA, VENTOLIN HFA) 90 mcg/actuation inhaler Take 2 Puffs by inhalation every four (4) hours as needed. No current facility-administered medications on file prior to visit. CARDIOLOGY STUDIES TO DATE:  4/21 mild tr, otherwise normal echo  7/21 negative lexiscan    Chief Complaint   Patient presents with    Leg Swelling     6 month f/u    Hypertension     6 month f/u    Shortness of Breath     6 month f/u    Arm Pain     pt states pain in right arm, week ago, comes ago, some numbness      HPI :  She is doing fairly well from a cardiac standpoint.   Initial blood pressure was little high today but she said last week when she saw her primary care was fine. Her sugars are better and she is lost a few pounds. Her primary is following her lipids. She still has shortness of breath with activity and leg edema and she continues to wear her CPAP  CARDIAC ROS:   negative for chest pain, palpitations, syncope, orthopnea, paroxysmal nocturnal dyspnea, exertional chest pressure/discomfort, claudication    Family History   Problem Relation Age of Onset    Cancer Mother 61        Breast cancer    Breast Cancer Mother 48    Heart Disease Father 50        M. IFrankey Lard Asthma Sister     Heart Disease Brother     Hypertension Brother     Hypertension Brother     Diabetes Brother     Lung Disease Brother         COPD       Past Medical History:   Diagnosis Date    Chronic pelvic pain in female 7/9/2014    Depression     [facility]     Hypertension     Obesity (BMI 35.0-39.9 without comorbidity)     SHAGUFTA on CPAP 3/9/2017    Posttraumatic stress disorder     Psychiatric disorder     depression    Recurrent major depression ([facility] 7/27/2010    S/P ventral herniorrhaphy 2/1/2018    Suicidal thoughts     Thyroid nodule 10/1/2015    Unspecified sleep apnea     uses cpap       GENERAL ROS:  A comprehensive review of systems was negative except for that written in the HPI.     Visit Vitals  BP (!) 150/84 (BP 1 Location: Left upper arm, BP Patient Position: Sitting, BP Cuff Size: Adult)   Pulse 82   Temp 97 °F (36.1 °C) (Oral)   Resp 16   Ht 5' (1.524 m)   Wt 250 lb 6.4 oz (113.6 kg)   LMP 07/20/2014   SpO2 99%   BMI 48.90 kg/m²       Wt Readings from Last 3 Encounters:   04/19/22 250 lb 6.4 oz (113.6 kg)   12/14/21 256 lb (116.1 kg)   10/23/21 250 lb (113.4 kg)            BP Readings from Last 3 Encounters:   04/19/22 (!) 150/84   12/14/21 (!) 152/90   10/23/21 (!) 175/92       PHYSICAL EXAM  General appearance: alert, cooperative, no distress, appears stated age  Neurologic: Alert and oriented X 3  Neck: supple, symmetrical, trachea midline, no adenopathy, no carotid bruit and no JVD  Lungs: clear to auscultation bilaterally  Heart: regular rate and rhythm, S1, S2 normal, no murmur, click, rub or gallop  Extremities: extremities normal, atraumatic, no cyanosis or edema    Lab Results   Component Value Date/Time    Cholesterol, total 127 02/18/2013 05:53 AM    Cholesterol, total 166 08/11/2012 05:20 AM    HDL Cholesterol 37 02/18/2013 05:53 AM    HDL Cholesterol 31 08/11/2012 05:20 AM    LDL, calculated 68.8 02/18/2013 05:53 AM    LDL, calculated 96.2 08/11/2012 05:20 AM    Triglyceride 106 02/18/2013 05:53 AM    Triglyceride 194 (H) 08/11/2012 05:20 AM    CHOL/HDL Ratio 3.4 02/18/2013 05:53 AM    CHOL/HDL Ratio 5.4 (H) 08/11/2012 05:20 AM     ASSESSMENT :      She is stable and I think asymptomatic from a cardiac perspective on reasonable medical regimen. Working to increase her losartan to 100 mg a day and she will start that when she finishes doubling up on her 50 mg tablets. current treatment plan is effective, no change in therapy  lab results and schedule of future lab studies reviewed with patient  reviewed diet, exercise and weight control    Encounter Diagnoses   Name Primary?  Essential hypertension Yes    SHAGUFTA on CPAP     Obesity, morbid (HCC)     Other emphysema (HCC)     Leg swelling      Orders Placed This Encounter    Ozempic 0.25 mg or 0.5 mg/dose (2 mg/1.5 ml) subq pen    cyclobenzaprine (FLEXERIL) 10 mg tablet    losartan (COZAAR) 100 mg tablet    atorvastatin (LIPITOR) 20 mg tablet       Follow-up and Dispositions    · Return in about 6 months (around 10/19/2022). Jamal Maldonado MD  4/19/2022  Please note that this dictation was completed with [employer], the [employer] voice recognition software. Quite often unanticipated grammatical, syntax, homophones, and other interpretive errors are inadvertently transcribed by the computer software. Please disregard these errors.   Please excuse any errors that have escaped final proofreading. Thank you.

## 2022-04-19 NOTE — PROGRESS NOTES
Room 1     Identified pt with two pt identifiers(name and ). Reviewed record in preparation for visit and have obtained necessary documentation. All patient medications has been reviewed. Chief Complaint   Patient presents with    Leg Swelling     6 month f/u    Hypertension     6 month f/u    Shortness of Breath     6 month f/u    Arm Pain     pt states pain in right arm, week ago, comes ago, some numbness        3 most recent PHQ Screens 2022   PHQ Not Done -   Little interest or pleasure in doing things Not at all   Feeling down, depressed, irritable, or hopeless Not at all   Total Score PHQ 2 0     Abuse Screening Questionnaire 1/10/2020   Do you ever feel afraid of your partner? N   Are you in a relationship with someone who physically or mentally threatens you? N   Is it safe for you to go home? Y       Health Maintenance Due   Topic    Hepatitis C Screening     COVID-19 Vaccine (1)    Pneumococcal 0-64 years (1 - PCV)    Foot Exam Q1     MICROALBUMIN Q1     Eye Exam Retinal or Dilated     DTaP/Tdap/Td series (1 - Tdap)    Shingrix Vaccine Age 49> (1 of 2)    Medicare Yearly Exam     Breast Cancer Screen Mammogram     Colorectal Cancer Screening Combo     A1C test (Diabetic or Prediabetic)          Health Maintenance Review: Patient reminded of \"due or due soon\" health maintenance. I have asked the patient to contact his/her primary care provider (PCP) for follow-up on his/her health maintenance.     Vitals:    22 1104 22 1115 22 1126   BP: (!) 159/92 (!) 161/91 (!) 150/84   Pulse: 82     Resp: 16     Temp: 97 °F (36.1 °C)     TempSrc: Oral     SpO2: 99%     Weight: 250 lb 6.4 oz (113.6 kg)     Height: 5' (1.524 m)     PainSc:   5     PainLoc: Arm     LMP: 2014       Wt Readings from Last 3 Encounters:   22 250 lb 6.4 oz (113.6 kg)   21 256 lb (116.1 kg)   10/23/21 250 lb (113.4 kg)     Temp Readings from Last 3 Encounters:   22 97 °F (36.1 °C) (Oral)   10/23/21 98.4 °F (36.9 °C)   08/03/21 98 °F (36.7 °C)     BP Readings from Last 3 Encounters:   04/19/22 (!) 150/84   12/14/21 (!) 152/90   10/23/21 (!) 175/92     Pulse Readings from Last 3 Encounters:   04/19/22 82   12/14/21 83   10/23/21 92       Coordination of Care Questionnaire:   1) Have you been to an emergency room, urgent care, or hospitalized since your last visit?   no       2. Have seen or consulted any other health care provider since your last visit? NO    Patient is accompanied by self I have received verbal consent from Stephani Martinez to discuss any/all medical information while they are present in the room.

## 2022-05-02 ENCOUNTER — TRANSCRIBE ORDER (OUTPATIENT)
Dept: SCHEDULING | Age: 57
End: 2022-05-02

## 2022-05-02 DIAGNOSIS — Z87.891 FORMER SMOKER: Primary | ICD-10-CM

## 2022-09-23 ENCOUNTER — TRANSCRIBE ORDER (OUTPATIENT)
Dept: SCHEDULING | Age: 57
End: 2022-09-23

## 2022-09-23 DIAGNOSIS — Z12.31 VISIT FOR SCREENING MAMMOGRAM: Primary | ICD-10-CM

## 2022-10-28 ENCOUNTER — HOSPITAL ENCOUNTER (OUTPATIENT)
Dept: MAMMOGRAPHY | Age: 57
Discharge: HOME | End: 2022-10-28
Attending: FAMILY MEDICINE
Payer: MEDICARE

## 2022-10-28 ENCOUNTER — HOSPITAL ENCOUNTER (OUTPATIENT)
Dept: CT IMAGING | Age: 57
Discharge: HOME | End: 2022-10-28
Attending: INTERNAL MEDICINE
Payer: MEDICARE

## 2022-10-28 DIAGNOSIS — Z87.891 FORMER SMOKER: ICD-10-CM

## 2022-10-28 DIAGNOSIS — Z12.31 VISIT FOR SCREENING MAMMOGRAM: ICD-10-CM

## 2022-10-28 PROCEDURE — 77063 BREAST TOMOSYNTHESIS BI: CPT

## 2022-10-28 PROCEDURE — 71271 CT THORAX LUNG CANCER SCR C-: CPT

## 2022-11-01 ENCOUNTER — HOSPITAL ENCOUNTER (OUTPATIENT)
Age: 57
Setting detail: OUTPATIENT SURGERY
Discharge: HOME | End: 2022-11-01
Attending: SPECIALIST | Admitting: SPECIALIST
Payer: MEDICARE

## 2022-11-01 ENCOUNTER — ANESTHESIA EVENT (OUTPATIENT)
Dept: ENDOSCOPY | Age: 57
End: 2022-11-01
Payer: MEDICARE

## 2022-11-01 ENCOUNTER — ANESTHESIA (OUTPATIENT)
Dept: ENDOSCOPY | Age: 57
End: 2022-11-01
Payer: MEDICARE

## 2022-11-01 VITALS
HEIGHT: 60 IN | HEART RATE: 82 BPM | TEMPERATURE: 97.8 F | SYSTOLIC BLOOD PRESSURE: 125 MMHG | OXYGEN SATURATION: 98 % | BODY MASS INDEX: 47.41 KG/M2 | RESPIRATION RATE: 18 BRPM | WEIGHT: 241.5 LBS | DIASTOLIC BLOOD PRESSURE: 77 MMHG

## 2022-11-01 LAB
GLUCOSE BLD STRIP.AUTO-MCNC: 112 MG/DL (ref 65–117)
SERVICE CMNT-IMP: NORMAL

## 2022-11-01 PROCEDURE — 74011250636 HC RX REV CODE- 250/636: Performed by: NURSE ANESTHETIST, CERTIFIED REGISTERED

## 2022-11-01 PROCEDURE — 77030021593 HC FCPS BIOP ENDOSC BSC -A: Performed by: SPECIALIST

## 2022-11-01 PROCEDURE — 74011250636 HC RX REV CODE- 250/636: Performed by: SPECIALIST

## 2022-11-01 PROCEDURE — 76040000019: Performed by: SPECIALIST

## 2022-11-01 PROCEDURE — 82962 GLUCOSE BLOOD TEST: CPT

## 2022-11-01 PROCEDURE — 76060000031 HC ANESTHESIA FIRST 0.5 HR: Performed by: SPECIALIST

## 2022-11-01 PROCEDURE — 88305 TISSUE EXAM BY PATHOLOGIST: CPT

## 2022-11-01 RX ORDER — NALOXONE HYDROCHLORIDE 0.4 MG/ML
0.4 INJECTION, SOLUTION INTRAMUSCULAR; INTRAVENOUS; SUBCUTANEOUS
Status: DISCONTINUED | OUTPATIENT
Start: 2022-11-01 | End: 2022-11-01 | Stop reason: HOSPADM

## 2022-11-01 RX ORDER — ATROPINE SULFATE 0.1 MG/ML
0.5 INJECTION INTRAVENOUS
Status: DISCONTINUED | OUTPATIENT
Start: 2022-11-01 | End: 2022-11-01 | Stop reason: HOSPADM

## 2022-11-01 RX ORDER — SODIUM CHLORIDE 9 MG/ML
50 INJECTION, SOLUTION INTRAVENOUS CONTINUOUS
Status: DISCONTINUED | OUTPATIENT
Start: 2022-11-01 | End: 2022-11-01 | Stop reason: HOSPADM

## 2022-11-01 RX ORDER — FLUMAZENIL 0.1 MG/ML
0.2 INJECTION INTRAVENOUS
Status: DISCONTINUED | OUTPATIENT
Start: 2022-11-01 | End: 2022-11-01 | Stop reason: HOSPADM

## 2022-11-01 RX ORDER — DEXTROMETHORPHAN/PSEUDOEPHED 2.5-7.5/.8
1.2 DROPS ORAL
Status: DISCONTINUED | OUTPATIENT
Start: 2022-11-01 | End: 2022-11-01 | Stop reason: HOSPADM

## 2022-11-01 RX ORDER — PROPOFOL 10 MG/ML
INJECTION, EMULSION INTRAVENOUS AS NEEDED
Status: DISCONTINUED | OUTPATIENT
Start: 2022-11-01 | End: 2022-11-01 | Stop reason: HOSPADM

## 2022-11-01 RX ORDER — SODIUM CHLORIDE 0.9 % (FLUSH) 0.9 %
5-40 SYRINGE (ML) INJECTION EVERY 8 HOURS
Status: DISCONTINUED | OUTPATIENT
Start: 2022-11-01 | End: 2022-11-01 | Stop reason: HOSPADM

## 2022-11-01 RX ORDER — SODIUM CHLORIDE 0.9 % (FLUSH) 0.9 %
5-40 SYRINGE (ML) INJECTION AS NEEDED
Status: DISCONTINUED | OUTPATIENT
Start: 2022-11-01 | End: 2022-11-01 | Stop reason: HOSPADM

## 2022-11-01 RX ORDER — SODIUM CHLORIDE 9 MG/ML
INJECTION, SOLUTION INTRAVENOUS
Status: DISCONTINUED | OUTPATIENT
Start: 2022-11-01 | End: 2022-11-01 | Stop reason: HOSPADM

## 2022-11-01 RX ORDER — EPINEPHRINE 0.1 MG/ML
1 INJECTION INTRACARDIAC; INTRAVENOUS
Status: DISCONTINUED | OUTPATIENT
Start: 2022-11-01 | End: 2022-11-01 | Stop reason: HOSPADM

## 2022-11-01 RX ADMIN — PROPOFOL 50 MG: 10 INJECTION, EMULSION INTRAVENOUS at 08:47

## 2022-11-01 RX ADMIN — PROPOFOL 50 MG: 10 INJECTION, EMULSION INTRAVENOUS at 08:42

## 2022-11-01 RX ADMIN — SODIUM CHLORIDE: 900 INJECTION, SOLUTION INTRAVENOUS at 08:35

## 2022-11-01 RX ADMIN — PROPOFOL 50 MG: 10 INJECTION, EMULSION INTRAVENOUS at 08:44

## 2022-11-01 RX ADMIN — PROPOFOL 50 MG: 10 INJECTION, EMULSION INTRAVENOUS at 08:58

## 2022-11-01 RX ADMIN — PROPOFOL 50 MG: 10 INJECTION, EMULSION INTRAVENOUS at 08:50

## 2022-11-01 RX ADMIN — PROPOFOL 50 MG: 10 INJECTION, EMULSION INTRAVENOUS at 08:53

## 2022-11-01 NOTE — ANESTHESIA PREPROCEDURE EVALUATION
Relevant Problems   RESPIRATORY SYSTEM   (+) SHAGUFTA on CPAP   (+) Pneumonia due to COVID-19 virus   (+) Smoker      NEUROLOGY   (+) Posttraumatic stress disorder   (+) Recurrent major depression (HCC)      CARDIOVASCULAR   (+) Essential hypertension      ENDOCRINE   (+) Class 3 severe obesity with body mass index (BMI) of 40.0 to 44.9 in adult (HCC)   (+) Obesity, morbid ([address]       Anesthetic History               Review of Systems / Medical History  Patient summary reviewed, nursing notes reviewed and pertinent labs reviewed    Pulmonary        Sleep apnea           Neuro/Psych         Psychiatric history     Cardiovascular    Hypertension              Exercise tolerance: >4 METS     GI/Hepatic/Renal                Endo/Other    Diabetes  Hypothyroidism  Morbid obesity     Other Findings            Physical Exam    Airway  Mallampati: III  TM Distance: 4 - 6 cm  Neck ROM: normal range of motion   Mouth opening: Normal     Cardiovascular    Rhythm: regular           Dental  No notable dental hx       Pulmonary  Breath sounds clear to auscultation               Abdominal         Other Findings            Anesthetic Plan    ASA: 3  Anesthesia type: MAC          Induction: Intravenous  Anesthetic plan and risks discussed with: Patient

## 2022-11-01 NOTE — H&P
Colonoscopy History and Physical      The patient was seen and examined. Date of last colonoscopy: , Polyps  Yes      The airway was assessed and documented. The problem list, past medical history, and medications were reviewed.      Patient Active Problem List   Diagnosis Code    Recurrent major depression (HCC) F33.9    Obesity (BMI 35.0-39.9 without comorbidity) E66.9    Posttraumatic stress disorder F43.10    H/O:  Z98.891    H/O tubal ligation Z98.51    Granulation tissue of vaginal cuff N89.8    Thyroid nodule E04.1    SHAGUFTA on CPAP G47.33, Z99.89    Essential hypertension I10    Smoker F17.200    Obesity, morbid (HCC) E66.01    S/P ventral herniorrhaphy Z98.890, Z87.19    Class 3 severe obesity with body mass index (BMI) of 40.0 to 44.9 in adult ([facility] E66.01, Z68.41    Tachypnea R06.82    Hypokalemia E87.6    Difficulty swallowing R13.10    Pneumonia due to COVID-19 virus U07.1, J12.82     Social History     Socioeconomic History    Marital status: SINGLE     Spouse name: Not on file    Number of children: Not on file    Years of education: Not on file    Highest education level: Not on file   Occupational History    Not on file   Tobacco Use    Smoking status: Former     Packs/day: 2.00     Years: 30.00     Pack years: 60.00     Types: Cigarettes     Start date: 1981     Quit date: 2021     Years since quittin.7    Smokeless tobacco: Never   Vaping Use    Vaping Use: Never used   Substance and Sexual Activity    Alcohol use: Never    Drug use: Never    Sexual activity: Not Currently     Partners: Male     Birth control/protection: Surgical   Other Topics Concern    Not on file   Social History Narrative    ** Merged History Encounter **          Social Determinants of Health     Financial Resource Strain: Not on file   Food Insecurity: Not on file   Transportation Needs: Not on file   Physical Activity: Not on file   Stress: Not on file   Social Connections: Not on file   Intimate Partner Violence: Not on file   Housing Stability: Not on file     Past Medical History:   Diagnosis Date    Chronic pelvic pain in female 7/9/2014    Depression     Diabetes ([facility]     Hypertension     Obesity (BMI 35.0-39.9 without comorbidity)     SHAGUFTA on CPAP 3/9/2017    Posttraumatic stress disorder     Psychiatric disorder     depression    Recurrent major depression ([facility] 7/27/2010    S/P ventral herniorrhaphy 2/1/2018    Suicidal thoughts     Thyroid nodule 10/1/2015    Unspecified sleep apnea     uses cpap         Prior to Admission Medications   Prescriptions Last Dose Informant Patient Reported? Taking? ARIPiprazole (ABILIFY) 5 mg tablet 10/31/2022 Other Yes Yes   Sig: Take 10 mg by mouth daily. NovoLOG Flexpen U-100 Insulin 100 unit/mL (3 mL) inpn 10/31/2022  Yes Yes   Sig: INSTILL 10 UNITS 3X A DAY BEFORE MEALS   Ozempic 0.25 mg or 0.5 mg/dose (2 mg/1.5 ml) subq pen 10/30/2022  Yes Yes   Sig: inject 0.25 milligrams subcutaneously every week   Vitamin D2 1,250 mcg (50,000 unit) capsule 10/29/2022  Yes No   Sig: take 1 capsule by mouth every week   albuterol (PROVENTIL HFA, VENTOLIN HFA) 90 mcg/actuation inhaler 10/31/2022 Other Yes Yes   Sig: Take 2 Puffs by inhalation every four (4) hours as needed. atorvastatin (LIPITOR) 20 mg tablet 10/31/2022  No Yes   Sig: Take 1 Tablet by mouth daily. 20 mg = 1 tab each dose, PO, bedtime, 0 Refills   buPROPion XL (WELLBUTRIN XL) 150 mg tablet 10/31/2022 Other Yes Yes   Sig: Take 300 mg by mouth daily. budesonide-formoteroL (SYMBICORT) 80-4.5 mcg/actuation HFAA 10/31/2022  Yes Yes   Sig: inhale 2 puffs by mouth twice a day Rinse mouth after use   dextroamphetamine-amphetamine (ADDERALL) 20 mg tablet 10/31/2022  Yes Yes   Sig: take 1 tablet by mouth every morning   diclofenac (VOLTAREN) 1 % gel 10/31/2022  Yes Yes   Sig: as needed.    ferrous sulfate 324 mg (65 mg iron) tablet 10/31/2022  Yes Yes   Sig: take 1 tablet by mouth once daily   insulin glargine,hum.rec.anlog (TOUJEO MAX U-300 SOLOSTAR SC) 10/31/2022  Yes Yes   Si Units by SubCUTAneous route. losartan (COZAAR) 100 mg tablet 10/31/2022  No Yes   Sig: Take 1 Tablet by mouth daily. metFORMIN (GLUCOPHAGE) 1,000 mg tablet 10/31/2022  Yes Yes   Sig: take 1 tablet by mouth twice a day   methylphenidate HCl (RITALIN) 10 mg tablet 10/31/2022  Yes Yes   Sig: take 1 tablet by mouth once daily if needed AROUND NOON   metoprolol succinate (TOPROL-XL) 25 mg XL tablet 10/31/2022 Other No Yes   Sig: Take 1 Tab by mouth daily. omeprazole (PRILOSEC) 40 mg capsule 10/31/2022 Other Yes Yes   Sig: Take 40 mg by mouth daily. oxyCODONE-acetaminophen (PERCOCET) 5-325 mg per tablet 10/31/2022  Yes Yes   Sig: take 1 tablet by mouth twice a day if needed for chronic pain   topiramate (TOPAMAX) 50 mg tablet 10/31/2022 Other Yes Yes   Sig: Take 50 mg by mouth nightly. Facility-Administered Medications: None       The patient was seen and examined in the endoscopy suite. The airway was assessed and documented. The problem list and medications were reviewed. Chief complaint, history of present illness, and review of systems and Past medical History are positive for: history of polyps    The heart, lungs and mental status were satisfactory for the administration of sedation and for the procedure. I discussed with the patient the objectives, risks, consequences and alternatives to the procedure. The patient was counseled at length about the risks of gianfranco Covid-19 in the nilam-operative and post-operative states including the recovery window of their procedure. The patient was made aware that gianfranco Covid-19 after a surgical procedure may worsen their prognosis for recovering from the virus and lend to a higher morbidity and or mortality risk. The patient was given the options of postponing their procedure. All of the risks, benefits, and alternatives were discussed.  The patient does  wish to proceed with the procedure.     Plan: Endoscopic procedure with sedation     Carla Millan MD   11/1/2022  8:40 AM

## 2022-11-01 NOTE — ANESTHESIA POSTPROCEDURE EVALUATION
Post-Anesthesia Evaluation and Assessment    Patient: Grace Negrete MRN: [id number]  SSN: [id number]    YOB: 1965  Age: 64 y.o. Sex: female      I have evaluated the patient and they are stable and ready for discharge from the PACU. Obstructive Sleep Apnea education discussed including taking post-op analgesics as prescribed, awareness of how opioid analgesics      affect sleep apnea, having a caregiver stay with you for 24 hrs., sleep with your CPAP machine in use, and follow up with a      physician who can perform sleep studies. Pt understands and agrees. Cardiovascular Function/Vital Signs  Visit Vitals  /77   Pulse 82   Temp 36.6 °C (97.8 °F)   Resp 18   Ht 5' (1.524 m)   Wt 109.5 kg (241 lb 8 oz)   SpO2 98%   Breastfeeding No   BMI 47.16 kg/m²       Patient is status post MAC anesthesia for Procedure(s):  COLONOSCOPY  ENDOSCOPIC POLYPECTOMY. Nausea/Vomiting: None    Postoperative hydration reviewed and adequate. Pain:  Pain Intensity 1: 0 (11/01/22 0815)   Managed    Neurological Status: At baseline    Mental Status, Level of Consciousness: Alert and  oriented to person, place, and time    Pulmonary Status:   O2 Device: Nasal cannula (11/01/22 0902)   Adequate oxygenation and airway patent    Complications related to anesthesia: None    Post-anesthesia assessment completed. No concerns    Signed By: Drake England MD     November 1, 2022              Procedure(s):  COLONOSCOPY  ENDOSCOPIC POLYPECTOMY. MAC    <BSHSIANPOST>    INITIAL Post-op Vital signs:   Vitals Value Taken Time   /77 11/01/22 0930   Temp     Pulse 89 11/01/22 0931   Resp 18 11/01/22 0931   SpO2 98 % 11/01/22 0931   Vitals shown include unvalidated device data.

## 2022-11-01 NOTE — PROCEDURES
[address]  [address] [facility], [facility] [address]                 Colonoscopy Procedure Note    Indications:   See Preoperative Diagnosis above  Referring Physician: Edwin Johnson MD  Anesthesia/Sedation: MAC anesthesia Propofol  Endoscopist:  Dr. Ethel Bay  Assistant:  Endoscopy Technician-1: Eva Miles  Endoscopy RN-1: James Kelsey RN  Preoperative diagnosis: SCREENING  Postoperative diagnosis: colon polyp, diverticulosis    Procedure in Detail:  Informed consent was obtained for the procedure, including sedation. Risks of perforation, hemorrhage, adverse drug reaction, and aspiration were discussed. The patient was placed in the left lateral decubitus position. Based on the pre-procedure assessment, including review of the patient's medical history, medications, allergies, and review of systems, she had been deemed to be an appropriate candidate for  sedation; she was therefore sedated with the medications listed above. The patient was monitored continuously with ECG tracing, pulse oximetry, blood pressure monitoring, and direct observations. A rectal examination was performed. The [id number] was inserted into the rectum and advanced under direct vision to the cecum, which was identified by the ileocecal valve and appendiceal orifice. The quality of the colonic preparation was fair. A careful inspection was made as the colonoscope was withdrawn, including a retroflexed view of the rectum; findings and interventions are described below. Appropriate photodocumentation was obtained. Findings:  Rectum: normal  Sigmoid: mild diverticulosis; Descending Colon: 3 mm polyp removed with cold biopsy  Transverse Colon: normal  Ascending Colon: normal  Cecum: normal    Specimens:     Colon polyp    EBL: None    Complications: None; patient tolerated the procedure well. Recommendations:     - Await pathology. - Repeat colonoscopy in 5 years.     - High fiber diet.        Signed By: Jed Cabezas MD                        November 1, 2022

## 2022-11-01 NOTE — DISCHARGE INSTRUCTIONS
[facility]  [id number]  1965    COLON DISCHARGE INSTRUCTIONS  Discomfort:  Redness at IV site- apply warm compress to area; if redness or soreness persist- contact your physician  There may be a slight amount of blood passed from the rectum  Gaseous discomfort- walking, belching will help relieve any discomfort    DIET:   High fiber diet. - however -  remember your colon is empty and a heavy meal will produce gas. Avoid these foods:  vegetables, fried / greasy foods, carbonated drinks for today. You may not drink alcoholic beverages for at least 12 hours    MEDICATIONS:   Regarding Aspirin or Nonsteroidal medications, please see below. ACTIVITY:  You may resume your normal daily activities it is recommended that you spend the remainder of the day resting -  avoid any strenuous activity. You may not operate a vehicle for 12 hours  You may not engage in an occupation involving machinery or appliances for rest of today  Avoid making any critical decisions for at least 24 hour    CALL M.D. ANY SIGN OF:  Increasing pain, nausea, vomiting  Abdominal distension (swelling)  New increased bleeding (oral or rectal)  Fever (chills)  Pain in chest area  Bloody discharge from nose or mouth  Shortness of breath    You may not  take any Advil, Aspirin, Ibuprofen, Motrin, Aleve, or Goodys for 10 days, ONLY  Tylenol as needed for pain. Post procedure diagnosis: colon polyp, diverticulosis      Follow-up Instructions:   Call Dr. Maximo Leo  Results of procedure / biopsy in 10 days  Telephone #  [phone]      DISCHARGE SUMMARY from Nurse    The following personal items collected during your admission are returned to you:   Dental Appliance: Dental Appliances: None  Vision: Visual Aid: None  Hearing Aid:    Jewelry:    Clothing:    Other Valuables:    Valuables sent to safe:      Learning About Coronavirus (COVID-19)  Coronavirus (COVID-19): Overview  What is coronavirus ([id number])?   The coronavirus disease (COVID-19) is caused by a virus. It is an illness that was first found in [place], [place], in December 2019. It has since spread worldwide. The virus can cause fever, cough, and trouble breathing. In severe cases, it can cause pneumonia and make it hard to breathe without help. It can cause death. Coronaviruses are a large group of viruses. They cause the common cold. They also cause more serious illnesses like Middle East respiratory syndrome (MERS) and severe acute respiratory syndrome (SARS). COVID-19 is caused by a novel coronavirus. That means it's a new type that has not been seen in people before. This virus spreads person-to-person through droplets from coughing and sneezing. It can also spread when you are close to someone who is infected. And it can spread when you touch something that has the virus on it, such as a doorknob or a tabletop. What can you do to protect yourself from coronavirus (COVID-19)? The best way to protect yourself from getting sick is to: Avoid areas where there is an outbreak. Avoid contact with people who may be infected. Wash your hands often with soap or alcohol-based hand sanitizers. Avoid crowds and try to stay at least 6 feet away from other people. Wash your hands often, especially after you cough or sneeze. Use soap and water, and scrub for at least 20 seconds. If soap and water aren't available, use an alcohol-based hand . Avoid touching your mouth, nose, and eyes. What can you do to avoid spreading the virus to others? To help avoid spreading the virus to others:  Cover your mouth with a tissue when you cough or sneeze. Then throw the tissue in the trash. Use a disinfectant to clean things that you touch often. Stay home if you are sick or have been exposed to the virus. Don't go to school, work, or public areas. And don't use public transportation. If you are sick:  Leave your home only if you need to get medical care.  But call the doctor's office first so they know you're coming. And wear a face mask, if you have one. If you have a face mask, wear it whenever you're around other people. It can help stop the spread of the virus when you cough or sneeze. Clean and disinfect your home every day. Use household  and disinfectant wipes or sprays. Take special care to clean things that you grab with your hands. These include doorknobs, remote controls, phones, and handles on your refrigerator and microwave. And don't forget countertops, tabletops, bathrooms, and computer keyboards. When to call for help  Call 911 anytime you think you may need emergency care. For example, call if:  You have severe trouble breathing. (You can't talk at all.)  You have constant chest pain or pressure. You are severely dizzy or lightheaded. You are confused or can't think clearly. Your face and lips have a blue color. You pass out (lose consciousness) or are very hard to wake up. Call your doctor now if you develop symptoms such as:  Shortness of breath. Fever. Cough. If you need to get care, call ahead to the doctor's office for instructions before you go. Make sure you wear a face mask, if you have one, to prevent exposing other people to the virus. Where can you get the latest information? The following health organizations are tracking and studying this virus. Their websites contain the most up-to-date information. Monster Quintanilla also learn what to do if you think you may have been exposed to the virus. U.S. Centers for Disease Control and Prevention (CDC): The CDC provides updated news about the disease and travel advice. The website also tells you how to prevent the spread of infection. www.cdc.gov  World Health Organization [facility]): WHO offers information about the virus outbreaks. WHO also has travel advice. www.who.int  Current as of: April 1, 2020               Content Version: 12.4  © 2006-2020 Healthwise, Incorporated.    Care instructions adapted under license by your healthcare professional. If you have questions about a medical condition or this instruction, always ask your healthcare professional. [place] [address] any warranty or liability for your use of this information.

## 2022-12-29 ENCOUNTER — OFFICE VISIT (OUTPATIENT)
Dept: CARDIOLOGY CLINIC | Age: 57
End: 2022-12-29
Payer: MEDICARE

## 2022-12-29 VITALS
RESPIRATION RATE: 17 BRPM | HEIGHT: 60 IN | OXYGEN SATURATION: 100 % | DIASTOLIC BLOOD PRESSURE: 81 MMHG | SYSTOLIC BLOOD PRESSURE: 125 MMHG | HEART RATE: 80 BPM | BODY MASS INDEX: 47.12 KG/M2 | WEIGHT: 240 LBS

## 2022-12-29 DIAGNOSIS — I10 ESSENTIAL HYPERTENSION: Primary | ICD-10-CM

## 2022-12-29 DIAGNOSIS — Z99.89 OSA ON CPAP: ICD-10-CM

## 2022-12-29 DIAGNOSIS — E66.01 OBESITY, MORBID (HCC): ICD-10-CM

## 2022-12-29 DIAGNOSIS — J43.8 OTHER EMPHYSEMA (HCC): ICD-10-CM

## 2022-12-29 DIAGNOSIS — M79.89 LEG SWELLING: ICD-10-CM

## 2022-12-29 DIAGNOSIS — G47.33 OSA ON CPAP: ICD-10-CM

## 2022-12-29 PROCEDURE — G8417 CALC BMI ABV UP PARAM F/U: HCPCS | Performed by: SPECIALIST

## 2022-12-29 PROCEDURE — G9717 DOC PT DX DEP/BP F/U NT REQ: HCPCS | Performed by: SPECIALIST

## 2022-12-29 PROCEDURE — G9899 SCRN MAM PERF RSLTS DOC: HCPCS | Performed by: SPECIALIST

## 2022-12-29 PROCEDURE — G8427 DOCREV CUR MEDS BY ELIG CLIN: HCPCS | Performed by: SPECIALIST

## 2022-12-29 PROCEDURE — 3078F DIAST BP <80 MM HG: CPT | Performed by: SPECIALIST

## 2022-12-29 PROCEDURE — 3017F COLORECTAL CA SCREEN DOC REV: CPT | Performed by: SPECIALIST

## 2022-12-29 PROCEDURE — 99213 OFFICE O/P EST LOW 20 MIN: CPT | Performed by: SPECIALIST

## 2022-12-29 PROCEDURE — 3074F SYST BP LT 130 MM HG: CPT | Performed by: SPECIALIST

## 2022-12-29 RX ORDER — LANCETS 30 GAUGE
EACH MISCELLANEOUS
COMMUNITY
Start: 2022-11-28

## 2022-12-29 RX ORDER — INSULIN GLARGINE 300 U/ML
55 INJECTION, SOLUTION SUBCUTANEOUS EVERY MORNING
COMMUNITY
Start: 2022-12-04

## 2022-12-29 RX ORDER — FLASH GLUCOSE SENSOR
KIT MISCELLANEOUS
COMMUNITY
Start: 2022-10-08

## 2022-12-29 RX ORDER — BLOOD-GLUCOSE TRANSMITTER
EACH MISCELLANEOUS
COMMUNITY
Start: 2022-10-12

## 2022-12-29 RX ORDER — BLOOD-GLUCOSE SENSOR
EACH MISCELLANEOUS
COMMUNITY
Start: 2022-10-23

## 2022-12-29 RX ORDER — ATORVASTATIN CALCIUM 20 MG/1
20 TABLET, FILM COATED ORAL DAILY
Qty: 90 TABLET | Refills: 3 | Status: SHIPPED | OUTPATIENT
Start: 2022-12-29

## 2022-12-29 RX ORDER — LOSARTAN POTASSIUM 100 MG/1
100 TABLET ORAL DAILY
Qty: 90 TABLET | Refills: 3 | Status: SHIPPED | OUTPATIENT
Start: 2022-12-29

## 2022-12-29 RX ORDER — BLOOD-GLUCOSE,RECEIVER,CONT
EACH MISCELLANEOUS
COMMUNITY
Start: 2022-10-12

## 2022-12-29 RX ORDER — KETOCONAZOLE 20 MG/G
CREAM TOPICAL
COMMUNITY
Start: 2022-12-15

## 2022-12-29 NOTE — PROGRESS NOTES
HISTORY OF PRESENT ILLNESS  Isidro Finn is a 62 y.o. female     SUMMARY:   Problem List  Date Reviewed: 2022            Codes Class Noted    Tachypnea ICD-10-CM: R06.82  ICD-9-CM: 786.06  2021        Hypokalemia ICD-10-CM: E87.6  ICD-9-CM: 276.8  2021        Difficulty swallowing ICD-10-CM: R13.10  ICD-9-CM: 787.20  2021        Pneumonia due to COVID-19 virus ICD-10-CM: U07.1, J12.82  ICD-9-CM: 480.8, 079.89  2021        Class 3 severe obesity with body mass index (BMI) of 40.0 to 44.9 in adult [facility]) ICD-10-CM: E66.01, Z68.41  ICD-9-CM: 278.01, V85.41  2020        S/P ventral herniorrhaphy ICD-10-CM: Z98.890, Z87.19  ICD-9-CM: V45.89  2018        Obesity, morbid ([address] ICD-10-CM: E66.01  ICD-9-CM: 278.01  2017        SHAGUFTA on CPAP ICD-10-CM: G47.33, Z99.89  ICD-9-CM: 327.23, V46.8  3/9/2017        Essential hypertension ICD-10-CM: I10  ICD-9-CM: 401.9  3/9/2017        Smoker ICD-10-CM: F17.200  ICD-9-CM: 305.1  3/9/2017        Thyroid nodule ICD-10-CM: E04.1  ICD-9-CM: 241.0  10/1/2015        Granulation tissue of vaginal cuff ICD-10-CM: N89.8  ICD-9-CM: 623.8  2014        H/O:  ICD-10-CM: Z98.891  ICD-9-CM: V45.89  2014        H/O tubal ligation ICD-10-CM: Z98.51  ICD-9-CM: V26.51  2014        Obesity (BMI 35.0-39.9 without comorbidity) ICD-10-CM: E66.9  ICD-9-CM: 278.00  Unknown        Posttraumatic stress disorder ICD-10-CM: F43.10  ICD-9-CM: 309.81  8/10/2012        Recurrent major depression ([facility] ICD-10-CM: F33.9  ICD-9-CM: 296.30  2010           Current Outpatient Medications on File Prior to Visit   Medication Sig    Dexcom G6  misc USE TO MONITOR BLOOD GLUCOSE CONTINUOUSLY    Dexcom G6 Sensor elisabeth USE TO MONITOR BLOOD GLUCOSE CONTINUOUSLY    Dexcom G6 Transmitter elisabeth USE AS DIRECTED TO MONITOR BLOOD GLUCOSE    FreeStyle Mega 2 Sensor kit USE AS INSTRUCTED    ketoconazole (NIZORAL) 2 % topical cream apply to affected area as directed    OneTouch Delica Plus Lancet 30 gauge misc use four times a day    Toujeo SoloStar U-300 Insulin 300 unit/mL (1.5 mL) inpn pen 55 Units by SubCUTAneous route Every morning. Before breakfast.    Ozempic 0.25 mg or 0.5 mg/dose (2 mg/1.5 ml) subq pen inject 0.25 milligrams subcutaneously every week    losartan (COZAAR) 100 mg tablet Take 1 Tablet by mouth daily. atorvastatin (LIPITOR) 20 mg tablet Take 1 Tablet by mouth daily. 20 mg = 1 tab each dose, PO, bedtime, 0 Refills    ferrous sulfate 324 mg (65 mg iron) tablet take 1 tablet by mouth once daily    budesonide-formoteroL (SYMBICORT) 80-4.5 mcg/actuation HFAA inhale 2 puffs by mouth twice a day Rinse mouth after use    dextroamphetamine-amphetamine (ADDERALL) 20 mg tablet take 1 tablet by mouth every morning    diclofenac (VOLTAREN) 1 % gel as needed. NovoLOG Flexpen U-100 Insulin 100 unit/mL (3 mL) inpn INSTILL 10 UNITS 3X A DAY BEFORE MEALS    metFORMIN (GLUCOPHAGE) 1,000 mg tablet take 1 tablet by mouth twice a day    methylphenidate HCl (RITALIN) 10 mg tablet take 1 tablet by mouth once daily if needed AROUND NOON    oxyCODONE-acetaminophen (PERCOCET) 5-325 mg per tablet take 1 tablet by mouth twice a day if needed for chronic pain    ARIPiprazole (ABILIFY) 10 mg tablet Take 10 mg by mouth daily. omeprazole (PRILOSEC) 40 mg capsule Take 40 mg by mouth daily. topiramate (TOPAMAX) 50 mg tablet Take 50 mg by mouth nightly. buPROPion XL (WELLBUTRIN XL) 300 mg XL tablet Take 300 mg by mouth daily. metoprolol succinate (TOPROL-XL) 25 mg XL tablet Take 1 Tab by mouth daily. albuterol (PROVENTIL HFA, VENTOLIN HFA) 90 mcg/actuation inhaler Take 2 Puffs by inhalation every four (4) hours as needed. No current facility-administered medications on file prior to visit.        CARDIOLOGY STUDIES TO DATE:  4/21 mild tr, otherwise normal echo  7/21 negative lexiscan    Chief Complaint   Patient presents with    Follow-up     6 mo appt.     HPI :  She is doing really well. She is she is deliberately lost more than 10 pounds since we last met and her endocrinologist is following her diabetes and her lipids. She is very active but not really exercising. Leg edema is under good control as is her blood pressure. She still wears her CPAP faithfully. CARDIAC ROS:   negative for chest pain, dyspnea, palpitations, syncope, orthopnea, paroxysmal nocturnal dyspnea, exertional chest pressure/discomfort, claudication    Family History   Problem Relation Age of Onset    Cancer Mother 61        Breast cancer    Breast Cancer Mother 48    Heart Disease Father 50        M. I. Asthma Sister     Heart Disease Brother     Hypertension Brother     Hypertension Brother     Diabetes Brother     Lung Disease Brother         COPD       Past Medical History:   Diagnosis Date    Chronic pelvic pain in female 7/9/2014    Depression     Di[address]     Hypertension     Obesity (BMI 35.0-39.9 without comorbidity)     SHAGUFTA on CPAP 3/9/2017    Posttraumatic stress disorder     Psychiatric disorder     depression    Recurrent major depression ([facility] 7/27/2010    S/P ventral herniorrhaphy 2/1/2018    Suicidal thoughts     Thyroid nodule 10/1/2015    Unspecified sleep apnea     uses cpap       GENERAL ROS:  A comprehensive review of systems was negative except for that written in the HPI.     Visit Vitals  /81 (BP 1 Location: Right upper arm, BP Patient Position: Sitting, BP Cuff Size: Large adult)   Pulse 80   Resp 17   Ht 5' (1.524 m)   Wt 240 lb (108.9 kg)   LMP 07/20/2014   SpO2 100%   BMI 46.87 kg/m²       Wt Readings from Last 3 Encounters:   12/29/22 240 lb (108.9 kg)   11/01/22 241 lb 8 oz (109.5 kg)   04/19/22 250 lb 6.4 oz (113.6 kg)            BP Readings from Last 3 Encounters:   12/29/22 125/81   11/01/22 125/77   04/19/22 (!) 150/84       PHYSICAL EXAM  General appearance: alert, cooperative, no distress, appears stated age  Neurologic: Alert and oriented X 3  Neck: supple, symmetrical, trachea midline, no adenopathy, no carotid bruit, and no JVD  Lungs: clear to auscultation bilaterally  Heart: regular rate and rhythm, S1, S2 normal, no murmur, click, rub or gallop  Extremities: edema tr    Lab Results   Component Value Date/Time    Cholesterol, total 127 02/18/2013 05:53 AM    Cholesterol, total 166 08/11/2012 05:20 AM    HDL Cholesterol 37 02/18/2013 05:53 AM    HDL Cholesterol 31 08/11/2012 05:20 AM    LDL, calculated 68.8 02/18/2013 05:53 AM    LDL, calculated 96.2 08/11/2012 05:20 AM    Triglyceride 106 02/18/2013 05:53 AM    Triglyceride 194 (H) 08/11/2012 05:20 AM    CHOL/HDL Ratio 3.4 02/18/2013 05:53 AM    CHOL/HDL Ratio 5.4 (H) 08/11/2012 05:20 AM     ASSESSMENT :      She is stable and asymptomatic, well compensated on a good medical regimen and needs no cardiac testing at this time. current treatment plan is effective, no change in therapy  lab results and schedule of future lab studies reviewed with patient  reviewed diet, exercise and weight control    Encounter Diagnoses   Name Primary? Essential hypertension Yes    SHAGUFTA on CPAP     Obesity, morbid (HCC)     Other emphysema (HCC)     Leg swelling      Orders Placed This Encounter    Dexcom G6  misc    Dexcom G6 Sensor elisabeth    Dexcom G6 Transmitter elisabeth    FreeStyle Mega 2 Sensor kit    ketoconazole (NIZORAL) 2 % topical cream    OneTouch Delica Plus Lancet 30 gauge misc    Connorujeo SoloStar U-300 Insulin 300 unit/mL (1.5 mL) inpn pen       Follow-up and Dispositions    Return in about 6 months (around 6/29/2023). Haze Castleman, MD  12/29/2022  Please note that this dictation was completed with [employer] the [employer] voice recognition software. Quite often unanticipated grammatical, syntax, homophones, and other interpretive errors are inadvertently transcribed by the computer software. Please disregard these errors.   Please excuse any errors that have escaped final proofreading. Thank you.

## 2023-05-16 ENCOUNTER — OFFICE VISIT (OUTPATIENT)
Age: 58
End: 2023-05-16

## 2023-05-16 VITALS
RESPIRATION RATE: 15 BRPM | HEIGHT: 60 IN | HEART RATE: 90 BPM | BODY MASS INDEX: 45.35 KG/M2 | DIASTOLIC BLOOD PRESSURE: 78 MMHG | WEIGHT: 231 LBS | SYSTOLIC BLOOD PRESSURE: 119 MMHG | OXYGEN SATURATION: 99 %

## 2023-05-16 DIAGNOSIS — G47.33 OBSTRUCTIVE SLEEP APNEA (ADULT) (PEDIATRIC): ICD-10-CM

## 2023-05-16 DIAGNOSIS — I10 ESSENTIAL HYPERTENSION: Primary | ICD-10-CM

## 2023-05-16 DIAGNOSIS — R06.02 SHORTNESS OF BREATH: ICD-10-CM

## 2023-05-16 DIAGNOSIS — E66.01 OBESITY, MORBID (HCC): ICD-10-CM

## 2023-05-16 PROCEDURE — 3074F SYST BP LT 130 MM HG: CPT | Performed by: SPECIALIST

## 2023-05-16 PROCEDURE — 3078F DIAST BP <80 MM HG: CPT | Performed by: SPECIALIST

## 2023-05-16 PROCEDURE — 99213 OFFICE O/P EST LOW 20 MIN: CPT | Performed by: SPECIALIST

## 2023-05-16 RX ORDER — LOSARTAN POTASSIUM 100 MG/1
100 TABLET ORAL DAILY
Qty: 90 TABLET | Refills: 3 | Status: SHIPPED | OUTPATIENT
Start: 2023-05-16

## 2023-05-16 ASSESSMENT — PATIENT HEALTH QUESTIONNAIRE - PHQ9
SUM OF ALL RESPONSES TO PHQ QUESTIONS 1-9: 0
1. LITTLE INTEREST OR PLEASURE IN DOING THINGS: 0
SUM OF ALL RESPONSES TO PHQ QUESTIONS 1-9: 0
2. FEELING DOWN, DEPRESSED OR HOPELESS: 0
SUM OF ALL RESPONSES TO PHQ9 QUESTIONS 1 & 2: 0

## 2023-05-16 NOTE — TELEPHONE ENCOUNTER
Per VO of MD    LOV: 5/16/2023     Future Appointments   Date Time Provider Cristi Barr   11/16/2023 10:00 AM Cathryn Lux MD [facility]  AMB       Requested Prescriptions     Signed Prescriptions Disp Refills    losartan (COZAAR) 100 MG tablet 90 tablet 3     Sig: Take 1 tablet by mouth daily     Authorizing Provider: Benji Bradley     Ordering User: Jessica Sherman

## 2023-05-16 NOTE — PROGRESS NOTES
HISTORY OF PRESENT ILLNESS  Jayjay Rod is a 62 y.o. female     SUMMARY:   Patient Active Problem List   Diagnosis    VAL on CPAP    Obesity (BMI 35.0-39.9 without comorbidity)    Smoker    Obesity, morbid (HCC)    Tachypnea    Granulation tissue of vaginal cuff    H/O:     S/P ventral herniorrhaphy    Class 3 severe obesity with body mass index (BMI) of 40.0 to 44.9 in adult [facility])    Pneumonia due to COVID-19 virus    Recurrent major depression (HCC)    H/O tubal ligation    Thyroid nodule    Hypokalemia    Difficulty swallowing    Posttraumatic stress disorder    Essential hypertension       Current Outpatient Medications   Medication Sig Dispense Refill    albuterol sulfate HFA (PROVENTIL;VENTOLIN;PROAIR) 108 (90 Base) MCG/ACT inhaler Inhale 2 puffs into the lungs every 4 hours as needed      amphetamine-dextroamphetamine (ADDERALL) 20 MG tablet take 1 tablet by mouth every morning      ARIPiprazole (ABILIFY) 10 MG tablet Take by mouth daily      atorvastatin (LIPITOR) 20 MG tablet Take by mouth daily      buPROPion (WELLBUTRIN XL) 300 MG extended release tablet Take by mouth daily      diclofenac sodium (VOLTAREN) 1 % GEL as needed      Ferrous Sulfate 324 MG TBEC take 1 tablet by mouth once daily      insulin aspart (NOVOLOG FLEXPEN) 100 UNIT/ML injection pen INSTILL 10 UNITS 3X A DAY BEFORE MEALS      insulin glargine, 1 unit dial, (TOUJEO SOLOSTAR) 300 UNIT/ML concentrated injection pen Inject 50 Units into the skin every morning      ketoconazole (NIZORAL) 2 % cream apply to affected area as directed      losartan (COZAAR) 100 MG tablet Take by mouth daily      metFORMIN (GLUCOPHAGE) 1000 MG tablet take 1 tablet by mouth twice a day      methylphenidate (RITALIN) 10 MG tablet take 1 tablet by mouth once daily if needed AROUND NOON      metoprolol succinate (TOPROL XL) 25 MG extended release tablet Take by mouth daily      omeprazole (PRILOSEC) 40 MG delayed release capsule Take by

## 2023-10-30 ENCOUNTER — HOSPITAL ENCOUNTER (OUTPATIENT)
Facility: HOSPITAL | Age: 58
Discharge: HOME | End: 2023-11-02
Attending: INTERNAL MEDICINE
Payer: MEDICARE

## 2023-10-30 DIAGNOSIS — Z87.891 PERSONAL HISTORY OF NICOTINE DEPENDENCE: ICD-10-CM

## 2023-10-30 PROCEDURE — 71271 CT THORAX LUNG CANCER SCR C-: CPT

## 2023-11-30 ENCOUNTER — OFFICE VISIT (OUTPATIENT)
Age: 58
End: 2023-11-30
Payer: MEDICARE

## 2023-11-30 VITALS
BODY MASS INDEX: 45.16 KG/M2 | OXYGEN SATURATION: 99 % | DIASTOLIC BLOOD PRESSURE: 78 MMHG | SYSTOLIC BLOOD PRESSURE: 118 MMHG | HEART RATE: 98 BPM | WEIGHT: 230 LBS | HEIGHT: 60 IN

## 2023-11-30 DIAGNOSIS — E66.01 OBESITY, MORBID (HCC): ICD-10-CM

## 2023-11-30 DIAGNOSIS — G47.33 OBSTRUCTIVE SLEEP APNEA (ADULT) (PEDIATRIC): ICD-10-CM

## 2023-11-30 DIAGNOSIS — I10 ESSENTIAL HYPERTENSION: Primary | ICD-10-CM

## 2023-11-30 DIAGNOSIS — E78.2 MIXED HYPERLIPIDEMIA: ICD-10-CM

## 2023-11-30 PROCEDURE — 99214 OFFICE O/P EST MOD 30 MIN: CPT | Performed by: SPECIALIST

## 2023-11-30 PROCEDURE — 3078F DIAST BP <80 MM HG: CPT | Performed by: SPECIALIST

## 2023-11-30 PROCEDURE — 3074F SYST BP LT 130 MM HG: CPT | Performed by: SPECIALIST

## 2023-11-30 RX ORDER — CLOBETASOL PROPIONATE 0.46 MG/ML
SOLUTION TOPICAL 2 TIMES DAILY
COMMUNITY
Start: 2023-11-17

## 2023-11-30 RX ORDER — TIRZEPATIDE 5 MG/.5ML
INJECTION, SOLUTION SUBCUTANEOUS
COMMUNITY
Start: 2023-11-27

## 2023-11-30 RX ORDER — BUDESONIDE AND FORMOTEROL FUMARATE DIHYDRATE 160; 4.5 UG/1; UG/1
2 AEROSOL RESPIRATORY (INHALATION) 2 TIMES DAILY
COMMUNITY
Start: 2023-11-23

## 2023-11-30 RX ORDER — INSULIN LISPRO 100 [IU]/ML
INJECTION, SOLUTION INTRAVENOUS; SUBCUTANEOUS
COMMUNITY
Start: 2023-10-11

## 2023-11-30 RX ORDER — PROCHLORPERAZINE 25 MG/1
SUPPOSITORY RECTAL
COMMUNITY
Start: 2023-11-20

## 2023-11-30 RX ORDER — LOSARTAN POTASSIUM 100 MG/1
100 TABLET ORAL DAILY
Qty: 30 TABLET | Refills: 3 | Status: SHIPPED | OUTPATIENT
Start: 2023-11-30

## 2023-11-30 NOTE — PROGRESS NOTES
HISTORY OF PRESENT ILLNESS  Gaye Johnson is a 62 y.o. female     SUMMARY:   Patient Active Problem List   Diagnosis    VAL on CPAP    Obesity (BMI 35.0-39.9 without comorbidity)    Smoker    Obesity, morbid (HCC)    Tachypnea    Granulation tissue of vaginal cuff    H/O:     S/P ventral herniorrhaphy    Class 3 severe obesity with body mass index (BMI) of 40.0 to 44.9 in adult [facility])    Pneumonia due to COVID-19 virus    Recurrent major depression ([address])    H/O tubal ligation    Thyroid nodule    Hypokalemia    Difficulty swallowing    Posttraumatic stress disorder    Essential hypertension            CARDIOLOGY STUDIES TO DATE:   mild tr, otherwise normal echo   negative lexiscan    Chief Complaint   Patient presents with    Hypertension       HPI :  She is doing well with no cardiac complaints or problems with her medications. She has developed some psoriasis and her dermatologist thinks it might be exacerbated by her metoprolol so that is been stopped and yet her blood pressure is doing great. She is walking some but has not lost any more weight. Reviewed her labs from [facility]. Most recent lipid profile looked outstanding and most recent A1c was 6.6. CARDIAC ROS:   negative for chest pain, dyspnea, irregular heart beat, lower extremity edema, orthopnea, paroxysmal nocturnal dyspnea, and syncope    Family History   Problem Relation Age of Onset    Asthma Sister     Heart Disease Father 50        M. I.     Breast Cancer Mother 48    Cancer Mother 61        Breast cancer    Lung Disease Brother         COPD    Diabetes Brother     Hypertension Brother     Heart Disease Brother     Hypertension Brother        Past Medical History:   Diagnosis Date    Chronic pelvic pain in female 2014    Depression     Diabetes ([address])     Hypertension     Obesity (BMI 35.0-39.9 without comorbidity)     VAL on CPAP 3/9/2017    Posttraumatic stress disorder     Psychiatric disorder     depression

## 2023-12-07 ENCOUNTER — HOSPITAL ENCOUNTER (EMERGENCY)
Facility: HOSPITAL | Age: 58
Discharge: HOME | End: 2023-12-07
Attending: EMERGENCY MEDICINE
Payer: MEDICARE

## 2023-12-07 VITALS
DIASTOLIC BLOOD PRESSURE: 86 MMHG | OXYGEN SATURATION: 95 % | BODY MASS INDEX: 44.75 KG/M2 | WEIGHT: 227.96 LBS | HEIGHT: 60 IN | TEMPERATURE: 97.9 F | HEART RATE: 95 BPM | RESPIRATION RATE: 16 BRPM | SYSTOLIC BLOOD PRESSURE: 134 MMHG

## 2023-12-07 DIAGNOSIS — L42 PITYRIASIS ROSEA: ICD-10-CM

## 2023-12-07 DIAGNOSIS — B37.89 CANDIDIASIS OF BREAST: Primary | ICD-10-CM

## 2023-12-07 LAB
ALBUMIN SERPL-MCNC: 3 G/DL (ref 3.5–5)
ALBUMIN/GLOB SERPL: 0.7 (ref 1.1–2.2)
ALP SERPL-CCNC: 82 U/L (ref 45–117)
ALT SERPL-CCNC: 36 U/L (ref 12–78)
ANION GAP SERPL CALC-SCNC: 7 MMOL/L (ref 5–15)
AST SERPL-CCNC: 17 U/L (ref 15–37)
BASOPHILS # BLD: 0 K/UL (ref 0–0.1)
BASOPHILS NFR BLD: 0 % (ref 0–1)
BILIRUB SERPL-MCNC: 0.6 MG/DL (ref 0.2–1)
BUN SERPL-MCNC: 13 MG/DL (ref 6–20)
BUN/CREAT SERPL: 14 (ref 12–20)
CALCIUM SERPL-MCNC: 8.8 MG/DL (ref 8.5–10.1)
CHLORIDE SERPL-SCNC: 111 MMOL/L (ref 97–108)
CO2 SERPL-SCNC: 23 MMOL/L (ref 21–32)
CREAT SERPL-MCNC: 0.91 MG/DL (ref 0.55–1.02)
DIFFERENTIAL METHOD BLD: ABNORMAL
EOSINOPHIL # BLD: 0.1 K/UL (ref 0–0.4)
EOSINOPHIL NFR BLD: 1 % (ref 0–7)
ERYTHROCYTE [DISTWIDTH] IN BLOOD BY AUTOMATED COUNT: 14.4 % (ref 11.5–14.5)
GLOBULIN SER CALC-MCNC: 4.3 G/DL (ref 2–4)
GLUCOSE SERPL-MCNC: 99 MG/DL (ref 65–100)
HCT VFR BLD AUTO: 33.7 % (ref 35–47)
HGB BLD-MCNC: 11.4 G/DL (ref 11.5–16)
IMM GRANULOCYTES # BLD AUTO: 0 K/UL (ref 0–0.04)
IMM GRANULOCYTES NFR BLD AUTO: 0 % (ref 0–0.5)
LYMPHOCYTES # BLD: 1.4 K/UL (ref 0.8–3.5)
LYMPHOCYTES NFR BLD: 22 % (ref 12–49)
MCH RBC QN AUTO: 27.5 PG (ref 26–34)
MCHC RBC AUTO-ENTMCNC: 33.8 G/DL (ref 30–36.5)
MCV RBC AUTO: 81.2 FL (ref 80–99)
MONOCYTES # BLD: 0.5 K/UL (ref 0–1)
MONOCYTES NFR BLD: 8 % (ref 5–13)
NEUTS SEG # BLD: 4.4 K/UL (ref 1.8–8)
NEUTS SEG NFR BLD: 69 % (ref 32–75)
NRBC # BLD: 0 K/UL (ref 0–0.01)
NRBC BLD-RTO: 0 PER 100 WBC
NT PRO BNP: 84 PG/ML
PLATELET # BLD AUTO: 192 K/UL (ref 150–400)
PMV BLD AUTO: 9.6 FL (ref 8.9–12.9)
POTASSIUM SERPL-SCNC: 3.8 MMOL/L (ref 3.5–5.1)
PROT SERPL-MCNC: 7.3 G/DL (ref 6.4–8.2)
RBC # BLD AUTO: 4.15 M/UL (ref 3.8–5.2)
SODIUM SERPL-SCNC: 141 MMOL/L (ref 136–145)
WBC # BLD AUTO: 6.4 K/UL (ref 3.6–11)

## 2023-12-07 PROCEDURE — 99283 EMERGENCY DEPT VISIT LOW MDM: CPT

## 2023-12-07 PROCEDURE — 80053 COMPREHEN METABOLIC PANEL: CPT

## 2023-12-07 PROCEDURE — 85025 COMPLETE CBC W/AUTO DIFF WBC: CPT

## 2023-12-07 PROCEDURE — 36415 COLL VENOUS BLD VENIPUNCTURE: CPT

## 2023-12-07 PROCEDURE — 2500000003 HC RX 250 WO HCPCS: Performed by: EMERGENCY MEDICINE

## 2023-12-07 PROCEDURE — 6370000000 HC RX 637 (ALT 250 FOR IP): Performed by: EMERGENCY MEDICINE

## 2023-12-07 PROCEDURE — 83880 ASSAY OF NATRIURETIC PEPTIDE: CPT

## 2023-12-07 RX ORDER — NYSTATIN 100000 U/G
CREAM TOPICAL
Qty: 15 G | Refills: 0 | Status: SHIPPED | OUTPATIENT
Start: 2023-12-07

## 2023-12-07 RX ORDER — FLUCONAZOLE 100 MG/1
200 TABLET ORAL
Status: COMPLETED | OUTPATIENT
Start: 2023-12-07 | End: 2023-12-07

## 2023-12-07 RX ADMIN — MICONAZOLE NITRATE: 2 POWDER TOPICAL at 11:12

## 2023-12-07 RX ADMIN — FLUCONAZOLE 200 MG: 100 TABLET ORAL at 09:53

## 2023-12-07 ASSESSMENT — ENCOUNTER SYMPTOMS
VOMITING: 0
COUGH: 0
COLOR CHANGE: 0
DIARRHEA: 0
ABDOMINAL PAIN: 0

## 2023-12-07 ASSESSMENT — PAIN SCALES - GENERAL: PAINLEVEL_OUTOF10: 8

## 2023-12-07 ASSESSMENT — PAIN - FUNCTIONAL ASSESSMENT: PAIN_FUNCTIONAL_ASSESSMENT: 0-10

## 2023-12-07 NOTE — ED NOTES
I have reviewed the provider's instructions with the patient, answering all questions to her satisfaction.  Pt ambulatory to waiting room      Jin Rubin RN  12/07/23 [id number]

## 2023-12-07 NOTE — ED PROVIDER NOTES
[facility] EMERGENCY DEPT  EMERGENCY DEPARTMENT ENCOUNTER       Pt Name: Lauren Hui  MRN: [id number]  [address] [facility] [place] 1965  Date of evaluation: 12/7/2023  Provider: Narayan Garcia MD   PCP: Vandana Plata MD  Note Started: 9:54m     [address] [facility]       Chief Complaint   Patient presents with    Leg Pain     Patient ambulatory into triage complaining of bilateral leg pain and heaviness that began 3 days ago. Patient reports the pain has been getting worse over the last 3 days. Rash     Patient complaining of bumps that have appeared on back, bilateral legs, and under the breasts. Patient reports that they are itchy sometimes, other times not. Patient denies changes to products or this ever happening before. Bumps appear dry, no redness noted. HISTORY OF PRESENT ILLNESS: 1 or more elements      History From: Patient  HPI Limitations: None     Lauren Hui is a 62 y.o. female who presents with rash, itching and leg swelling. Rash x1 week without new meds or foods or products  Swelling in her legs x3 days without CP or SOB     Nursing Notes were all reviewed and agreed with or any disagreements were addressed in the HPI. REVIEW OF SYSTEMS      Review of Systems   Constitutional:  Negative for activity change, appetite change, chills, fatigue and fever. HENT:  Negative for congestion. Eyes:  Negative for visual disturbance. Respiratory:  Negative for cough. Cardiovascular:  Positive for leg swelling. Negative for chest pain. Gastrointestinal:  Negative for abdominal pain, diarrhea and vomiting. Genitourinary:  Negative for dysuria. Musculoskeletal:  Negative for gait problem. Skin:  Positive for rash. Negative for color change. Neurological:  Negative for dizziness and weakness. Positives and Pertinent negatives as per HPI.     PAST HISTORY     Past Medical History:  Past Medical History:   Diagnosis Date    Chronic pelvic pain in female 7/9/2014    Depression P [phone] Band[place] Tamie [phone]  [place]      Hours: 24-hours Phone: [phone]   nystatin [id number] UNIT/GM cream           DISCONTINUED MEDICATIONS:  Discharge Medication List as of 12/7/2023 10:44 AM          I am the Primary Clinician of Record. Gabriele Enriquez MD (electronically signed)      (Please note that parts of this dictation were completed with voice recognition software. Quite often unanticipated grammatical, syntax, homophones, and other interpretive errors are inadvertently transcribed by the computer software. Please disregards these errors.  Please excuse any errors that have escaped final proofreading.)         Megan Molina MD  12/07/23 [id number]

## 2024-02-20 ENCOUNTER — TELEPHONE (OUTPATIENT)
Age: 59
End: 2024-02-20

## 2024-02-20 DIAGNOSIS — R00.0 TACHYCARDIA: Primary | ICD-10-CM

## 2024-02-20 DIAGNOSIS — R00.2 PALPITATIONS: ICD-10-CM

## 2024-02-20 NOTE — TELEPHONE ENCOUNTER
Spoke with patient after 2 identifiers regarding Dr. Cunningham would like for her to have a 48 hour Holter Monitor placed. Order placed and patient aware that she will be receiving a call to set this up. Verbalized understanding with no further questions.

## 2024-02-26 ENCOUNTER — HOSPITAL ENCOUNTER (OUTPATIENT)
Facility: HOSPITAL | Age: 59
Discharge: HOME | End: 2024-02-28
Attending: SPECIALIST
Payer: MEDICARE

## 2024-02-26 DIAGNOSIS — R00.2 PALPITATIONS: ICD-10-CM

## 2024-02-26 DIAGNOSIS — R00.0 TACHYCARDIA: ICD-10-CM

## 2024-02-26 PROCEDURE — 93225 XTRNL ECG REC<48 HRS REC: CPT

## 2024-03-06 ENCOUNTER — TELEPHONE (OUTPATIENT)
Age: 59
End: 2024-03-06

## 2024-03-06 DIAGNOSIS — R00.0 TACHYCARDIA: Primary | ICD-10-CM

## 2024-03-06 DIAGNOSIS — I10 ESSENTIAL HYPERTENSION: ICD-10-CM

## 2024-03-06 RX ORDER — METOPROLOL SUCCINATE 25 MG/1
25 TABLET, EXTENDED RELEASE ORAL DAILY
Qty: 30 TABLET | Refills: 3 | Status: SHIPPED | OUTPATIENT
Start: 2024-03-06

## 2024-04-19 ENCOUNTER — TRANSCRIBE ORDERS (OUTPATIENT)
Facility: HOSPITAL | Age: 59
End: 2024-04-19

## 2024-04-19 ENCOUNTER — HOSPITAL ENCOUNTER (OUTPATIENT)
Facility: HOSPITAL | Age: 59
End: 2024-04-19
Attending: INTERNAL MEDICINE
Payer: MEDICARE

## 2024-04-19 DIAGNOSIS — R06.02 SHORTNESS OF BREATH: Primary | ICD-10-CM

## 2024-04-19 DIAGNOSIS — R06.02 SHORTNESS OF BREATH: ICD-10-CM

## 2024-04-19 PROCEDURE — 71046 X-RAY EXAM CHEST 2 VIEWS: CPT

## 2024-05-30 ENCOUNTER — OFFICE VISIT (OUTPATIENT)
Age: 59
End: 2024-05-30

## 2024-05-30 VITALS
HEART RATE: 80 BPM | OXYGEN SATURATION: 97 % | SYSTOLIC BLOOD PRESSURE: 130 MMHG | BODY MASS INDEX: 46.53 KG/M2 | DIASTOLIC BLOOD PRESSURE: 68 MMHG | WEIGHT: 237 LBS | HEIGHT: 60 IN

## 2024-05-30 DIAGNOSIS — R00.0 TACHYCARDIA: ICD-10-CM

## 2024-05-30 DIAGNOSIS — G47.33 OBSTRUCTIVE SLEEP APNEA (ADULT) (PEDIATRIC): ICD-10-CM

## 2024-05-30 DIAGNOSIS — I10 ESSENTIAL HYPERTENSION: Primary | ICD-10-CM

## 2024-05-30 DIAGNOSIS — E66.01 OBESITY, MORBID (HCC): ICD-10-CM

## 2024-05-30 DIAGNOSIS — E78.2 MIXED HYPERLIPIDEMIA: ICD-10-CM

## 2024-05-30 DIAGNOSIS — R00.2 PALPITATIONS: ICD-10-CM

## 2024-05-30 RX ORDER — METHOTREXATE 2.5 MG/1
TABLET ORAL
COMMUNITY

## 2024-05-30 RX ORDER — PREDNISONE 5 MG/1
10 TABLET ORAL DAILY
COMMUNITY

## 2024-05-30 RX ORDER — ATORVASTATIN CALCIUM 20 MG/1
20 TABLET, FILM COATED ORAL DAILY
Qty: 90 TABLET | Refills: 3 | Status: SHIPPED | OUTPATIENT
Start: 2024-05-30

## 2024-05-30 RX ORDER — CALCIPOTRIENE 50 UG/G
CREAM TOPICAL
COMMUNITY
Start: 2024-03-25

## 2024-05-30 RX ORDER — SEMAGLUTIDE 2.68 MG/ML
INJECTION, SOLUTION SUBCUTANEOUS
COMMUNITY
Start: 2024-05-23

## 2024-05-30 RX ORDER — METOPROLOL SUCCINATE 25 MG/1
25 TABLET, EXTENDED RELEASE ORAL DAILY
Qty: 90 TABLET | Refills: 3 | Status: SHIPPED | OUTPATIENT
Start: 2024-05-30

## 2024-05-30 RX ORDER — FOLIC ACID 1 MG/1
1000 TABLET ORAL DAILY
COMMUNITY

## 2024-05-30 RX ORDER — INSULIN LISPRO 100 [IU]/ML
INJECTION, SOLUTION INTRAVENOUS; SUBCUTANEOUS
COMMUNITY
Start: 2024-05-24

## 2024-05-30 NOTE — PROGRESS NOTES
HISTORY OF PRESENT ILLNESS  Rosalio Bolivar is a 58 y.o. female     SUMMARY:   Patient Active Problem List   Diagnosis    VAL on CPAP    Obesity (BMI 35.0-39.9 without comorbidity)    Smoker    Obesity, morbid (HCC)    Tachypnea    Granulation tissue of vaginal cuff    H/O:     S/P ventral herniorrhaphy    Class 3 severe obesity with body mass index (BMI) of 40.0 to 44.9 in adult (HCC)    Pneumonia due to COVID-19 virus    Recurrent major depression (HCC)    H/O tubal ligation    Thyroid nodule    Hypokalemia    Difficulty swallowing    Posttraumatic stress disorder    Essential hypertension            CARDIOLOGY STUDIES TO DATE:   mild tr, otherwise normal echo   negative lexiscan    Chief Complaint   Patient presents with    Hypertension       HPI :  When we last met we had stopped her metoprolol because her rheumatologist externa might be making her psoriatic arthritis worse.  Shortly thereafter she started feeling palpitations and did not like it so we put her back on 25 mg and that is straighten everything out.  Still having trouble with her sugars and her arthritis.  She is faithfully wearing her CPAP and her blood pressure is well-controlled.  Most recent lipid profile looked excellent.  She has gained a little weight since we last met.  She has mild dependent edema.      CARDIAC ROS:   negative for chest pain, claudication, dyspnea, orthopnea, paroxysmal nocturnal dyspnea, and syncope    Family History   Problem Relation Age of Onset    Asthma Sister     Heart Disease Father 48        M.I.    Breast Cancer Mother 53    Cancer Mother 63        Breast cancer    Lung Disease Brother         COPD    Diabetes Brother     Hypertension Brother     Heart Disease Brother     Hypertension Brother        Past Medical History:   Diagnosis Date    Chronic pelvic pain in female 2014    Depression     Diabetes (HCC)     Hypertension     Obesity (BMI 35.0-39.9 without comorbidity)     VAL on

## 2024-07-25 RX ORDER — LOSARTAN POTASSIUM 100 MG/1
100 TABLET ORAL DAILY
Qty: 90 TABLET | Refills: 1 | Status: SHIPPED | OUTPATIENT
Start: 2024-07-25

## 2024-07-25 NOTE — TELEPHONE ENCOUNTER
Requested Prescriptions     Signed Prescriptions Disp Refills    losartan (COZAAR) 100 MG tablet 90 tablet 1     Sig: TAKE 1 TABLET BY MOUTH EVERY DAY     Authorizing Provider: LONG CUNNINGHAM III     Ordering User: ERIN SILVA      Future Appointments   Date Time Provider Department Center   10/30/2024  1:00 PM [facility] CT 1 [facility]   11/12/2024  9:00 AM [facility]      Per Verbal Order by MD/NP

## 2024-08-19 ENCOUNTER — TELEPHONE (OUTPATIENT)
Age: 59
End: 2024-08-19

## 2024-08-19 DIAGNOSIS — I10 ESSENTIAL HYPERTENSION: ICD-10-CM

## 2024-08-19 DIAGNOSIS — R00.0 TACHYCARDIA: ICD-10-CM

## 2024-08-19 RX ORDER — METOPROLOL SUCCINATE 25 MG/1
25 TABLET, EXTENDED RELEASE ORAL DAILY
Qty: 90 TABLET | Refills: 3 | Status: SHIPPED | OUTPATIENT
Start: 2024-08-19

## 2024-08-19 NOTE — TELEPHONE ENCOUNTER
Requested Prescriptions     Signed Prescriptions Disp Refills    metoprolol succinate (TOPROL XL) 25 MG extended release tablet 90 tablet 3     Sig: Take 1 tablet by mouth daily     Authorizing Provider: LONG CUNNINGHAM III     Ordering User: ERIN SILVA      Future Appointments   Date Time Provider Department Center   10/30/2024  1:00 PM [facility] CT 1 [facility]   11/12/2024  9:00 AM oLng Cunningham III, MD [facility] BS AMB      Per Verbal Order by MD/NP

## 2024-10-30 ENCOUNTER — HOSPITAL ENCOUNTER (OUTPATIENT)
Facility: HOSPITAL | Age: 59
Discharge: HOME | End: 2024-11-02
Attending: INTERNAL MEDICINE
Payer: MEDICARE

## 2024-10-30 DIAGNOSIS — Z87.891 PERSONAL HISTORY OF TOBACCO USE: ICD-10-CM

## 2024-10-30 DIAGNOSIS — F17.211 CIGARETTE NICOTINE DEPENDENCE IN REMISSION: ICD-10-CM

## 2024-10-30 PROCEDURE — 71271 CT THORAX LUNG CANCER SCR C-: CPT

## 2024-11-12 ENCOUNTER — OFFICE VISIT (OUTPATIENT)
Age: 59
End: 2024-11-12
Payer: MEDICARE

## 2024-11-12 VITALS
HEIGHT: 60 IN | HEART RATE: 91 BPM | DIASTOLIC BLOOD PRESSURE: 68 MMHG | WEIGHT: 240.2 LBS | OXYGEN SATURATION: 97 % | SYSTOLIC BLOOD PRESSURE: 106 MMHG | BODY MASS INDEX: 47.16 KG/M2

## 2024-11-12 DIAGNOSIS — E66.01 OBESITY, MORBID: ICD-10-CM

## 2024-11-12 DIAGNOSIS — R06.02 SHORTNESS OF BREATH: ICD-10-CM

## 2024-11-12 DIAGNOSIS — G47.33 OBSTRUCTIVE SLEEP APNEA (ADULT) (PEDIATRIC): ICD-10-CM

## 2024-11-12 DIAGNOSIS — E78.2 MIXED HYPERLIPIDEMIA: ICD-10-CM

## 2024-11-12 DIAGNOSIS — I10 ESSENTIAL HYPERTENSION: Primary | ICD-10-CM

## 2024-11-12 DIAGNOSIS — R00.0 TACHYCARDIA: ICD-10-CM

## 2024-11-12 DIAGNOSIS — R00.2 PALPITATIONS: ICD-10-CM

## 2024-11-12 PROCEDURE — 3078F DIAST BP <80 MM HG: CPT | Performed by: SPECIALIST

## 2024-11-12 PROCEDURE — 99214 OFFICE O/P EST MOD 30 MIN: CPT | Performed by: SPECIALIST

## 2024-11-12 PROCEDURE — 3074F SYST BP LT 130 MM HG: CPT | Performed by: SPECIALIST

## 2024-11-12 RX ORDER — LOSARTAN POTASSIUM 100 MG/1
100 TABLET ORAL DAILY
Qty: 90 TABLET | Refills: 3 | Status: SHIPPED | OUTPATIENT
Start: 2024-11-12

## 2024-11-12 RX ORDER — ATORVASTATIN CALCIUM 20 MG/1
20 TABLET, FILM COATED ORAL DAILY
Qty: 90 TABLET | Refills: 3 | Status: SHIPPED | OUTPATIENT
Start: 2024-11-12

## 2024-11-12 RX ORDER — METOPROLOL SUCCINATE 25 MG/1
25 TABLET, EXTENDED RELEASE ORAL DAILY
Qty: 90 TABLET | Refills: 3 | Status: SHIPPED | OUTPATIENT
Start: 2024-11-12

## 2024-11-12 ASSESSMENT — PATIENT HEALTH QUESTIONNAIRE - PHQ9
SUM OF ALL RESPONSES TO PHQ9 QUESTIONS 1 & 2: 0
SUM OF ALL RESPONSES TO PHQ QUESTIONS 1-9: 0
SUM OF ALL RESPONSES TO PHQ QUESTIONS 1-9: 0
1. LITTLE INTEREST OR PLEASURE IN DOING THINGS: NOT AT ALL
SUM OF ALL RESPONSES TO PHQ QUESTIONS 1-9: 0
2. FEELING DOWN, DEPRESSED OR HOPELESS: NOT AT ALL
SUM OF ALL RESPONSES TO PHQ QUESTIONS 1-9: 0

## 2024-11-12 NOTE — PROGRESS NOTES
HISTORY OF PRESENT ILLNESS  Rosalio Bolivar is a 58 y.o. female     SUMMARY:   Patient Active Problem List   Diagnosis    VAL on CPAP    Obesity (BMI 35.0-39.9 without comorbidity)    Smoker    Obesity, morbid    Tachypnea    Granulation tissue of vaginal cuff    H/O:     S/P ventral herniorrhaphy    Class 3 severe obesity with body mass index (BMI) of 40.0 to 44.9 in adult    Pneumonia due to COVID-19 virus    Recurrent major depression (HCC)    H/O tubal ligation    Thyroid nodule    Hypokalemia    Difficulty swallowing    Posttraumatic stress disorder    Essential hypertension            CARDIOLOGY STUDIES TO DATE:   mild tr, otherwise normal echo   negative lexiscan   event monitor, sinus tachycardia, rare pac, pvc    Chief Complaint   Patient presents with    6 Month Follow-Up     Essential hypertension    Hypertension       HPI :  She is doing really well from a cardiac standpoint with no complaints or problems with her medications.  She is walking some though not a lot and she has gained a little weight.  Blood pressure is well-controlled.  Her last lipid profile was just about a year ago and [facility] and it looked outstanding.  She says her last A1c was about 10.6.  She faithfully wears her CPAP.  She is on new medications for her rheumatoid with seem to be helping.  She still gets short of breath with minimal activity and still has problems with dependent edema.  No recent palpitations or tachycardia, and reviewed her event monitor from previously which did not show anything serious.    CARDIAC ROS:   negative for chest pain, claudication, orthopnea, paroxysmal nocturnal dyspnea, and syncope    Family History   Problem Relation Age of Onset    Asthma Sister     Heart Disease Father 48        M.I.    Breast Cancer Mother 53    Cancer Mother 63        Breast cancer    Lung Disease Brother         COPD    Diabetes Brother     Hypertension Brother     Heart Disease Brother

## 2025-03-20 ENCOUNTER — TELEPHONE (OUTPATIENT)
Age: 60
End: 2025-03-20

## 2025-03-20 NOTE — TELEPHONE ENCOUNTER
Julisa Rios NP with [facility] is calling in regards to she would like Dr. Cunningham to return her call to discuss patients care.     Cell Phone #  [phone]

## 2025-04-04 ENCOUNTER — TELEPHONE (OUTPATIENT)
Age: 60
End: 2025-04-04

## 2025-04-04 NOTE — TELEPHONE ENCOUNTER
Hakan from Julisa Rios's (NP) office is calling in regards to patient had a stress test completed and it came back ABNORMAL. Patient is scheduled for 4/15/2025.     [facility]   Phone # [phone]

## 2025-04-04 NOTE — TELEPHONE ENCOUNTER
Spoke with Bryce with [facility] as I was returning a call from Hakan at Julisa Rios's office.  Bryce stated she will leave a message for Hakan to call this office.        Hakan from Julisa Rios's (NP) office is calling in regards to patient had a stress test completed and it came back ABNORMAL. Patient is scheduled for 4/15/2025.      [facility]   Phone # [phone]

## 2025-04-15 ENCOUNTER — OFFICE VISIT (OUTPATIENT)
Age: 60
End: 2025-04-15
Payer: MEDICARE

## 2025-04-15 ENCOUNTER — TELEPHONE (OUTPATIENT)
Age: 60
End: 2025-04-15

## 2025-04-15 VITALS
SYSTOLIC BLOOD PRESSURE: 136 MMHG | BODY MASS INDEX: 46.72 KG/M2 | HEIGHT: 60 IN | DIASTOLIC BLOOD PRESSURE: 80 MMHG | WEIGHT: 238 LBS | OXYGEN SATURATION: 99 % | HEART RATE: 89 BPM

## 2025-04-15 DIAGNOSIS — I10 ESSENTIAL HYPERTENSION: Primary | ICD-10-CM

## 2025-04-15 DIAGNOSIS — R94.39 ABNORMAL NUCLEAR STRESS TEST: ICD-10-CM

## 2025-04-15 DIAGNOSIS — E11.9 TYPE 2 DIABETES MELLITUS TREATED WITH INSULIN (HCC): ICD-10-CM

## 2025-04-15 DIAGNOSIS — R06.02 SHORTNESS OF BREATH: ICD-10-CM

## 2025-04-15 DIAGNOSIS — R00.0 TACHYCARDIA: ICD-10-CM

## 2025-04-15 DIAGNOSIS — R06.02 SOB (SHORTNESS OF BREATH): ICD-10-CM

## 2025-04-15 DIAGNOSIS — E11.65 TYPE 2 DIABETES MELLITUS WITH HYPERGLYCEMIA, WITH LONG-TERM CURRENT USE OF INSULIN (HCC): ICD-10-CM

## 2025-04-15 DIAGNOSIS — E78.2 MIXED HYPERLIPIDEMIA: ICD-10-CM

## 2025-04-15 DIAGNOSIS — Z79.4 TYPE 2 DIABETES MELLITUS TREATED WITH INSULIN (HCC): ICD-10-CM

## 2025-04-15 DIAGNOSIS — Z79.4 TYPE 2 DIABETES MELLITUS WITH HYPERGLYCEMIA, WITH LONG-TERM CURRENT USE OF INSULIN (HCC): ICD-10-CM

## 2025-04-15 DIAGNOSIS — G47.33 OBSTRUCTIVE SLEEP APNEA (ADULT) (PEDIATRIC): ICD-10-CM

## 2025-04-15 DIAGNOSIS — Z01.812 PRE-PROCEDURE LAB EXAM: ICD-10-CM

## 2025-04-15 DIAGNOSIS — E66.01 OBESITY, MORBID: ICD-10-CM

## 2025-04-15 DIAGNOSIS — R94.39 ABNORMAL STRESS TEST: ICD-10-CM

## 2025-04-15 DIAGNOSIS — I10 ESSENTIAL HYPERTENSION: ICD-10-CM

## 2025-04-15 DIAGNOSIS — R00.2 PALPITATIONS: ICD-10-CM

## 2025-04-15 PROCEDURE — 99214 OFFICE O/P EST MOD 30 MIN: CPT | Performed by: SPECIALIST

## 2025-04-15 PROCEDURE — 3075F SYST BP GE 130 - 139MM HG: CPT | Performed by: SPECIALIST

## 2025-04-15 PROCEDURE — 3079F DIAST BP 80-89 MM HG: CPT | Performed by: SPECIALIST

## 2025-04-15 RX ORDER — SODIUM CHLORIDE 0.9 % (FLUSH) 0.9 %
5-40 SYRINGE (ML) INJECTION PRN
OUTPATIENT
Start: 2025-04-15

## 2025-04-15 RX ORDER — CITALOPRAM HYDROBROMIDE 40 MG/1
40 TABLET ORAL DAILY
COMMUNITY

## 2025-04-15 RX ORDER — ATORVASTATIN CALCIUM 20 MG/1
20 TABLET, FILM COATED ORAL EVERY EVENING
Qty: 90 TABLET | Refills: 3 | Status: SHIPPED | OUTPATIENT
Start: 2025-04-15

## 2025-04-15 RX ORDER — LOSARTAN POTASSIUM 100 MG/1
100 TABLET ORAL DAILY
Qty: 90 TABLET | Refills: 3 | Status: SHIPPED | OUTPATIENT
Start: 2025-04-15

## 2025-04-15 RX ORDER — ASPIRIN 81 MG/1
81 TABLET ORAL DAILY
Qty: 90 TABLET | Refills: 3 | Status: SHIPPED | OUTPATIENT
Start: 2025-04-15

## 2025-04-15 RX ORDER — LANCETS 33 GAUGE
EACH MISCELLANEOUS
COMMUNITY
Start: 2025-04-14

## 2025-04-15 RX ORDER — FAMOTIDINE 20 MG/1
20 TABLET, FILM COATED ORAL 2 TIMES DAILY
COMMUNITY
Start: 2025-03-26 | End: 2026-03-26

## 2025-04-15 RX ORDER — SODIUM CHLORIDE 9 MG/ML
INJECTION, SOLUTION INTRAVENOUS PRN
OUTPATIENT
Start: 2025-04-15

## 2025-04-15 RX ORDER — METOPROLOL SUCCINATE 25 MG/1
25 TABLET, EXTENDED RELEASE ORAL DAILY
Qty: 90 TABLET | Refills: 3 | Status: SHIPPED | OUTPATIENT
Start: 2025-04-15

## 2025-04-15 RX ORDER — INSULIN HUMAN 100 [IU]/ML
INJECTION, SUSPENSION SUBCUTANEOUS EVERY MORNING
COMMUNITY
Start: 2025-04-14

## 2025-04-15 ASSESSMENT — PATIENT HEALTH QUESTIONNAIRE - PHQ9
1. LITTLE INTEREST OR PLEASURE IN DOING THINGS: NOT AT ALL
9. THOUGHTS THAT YOU WOULD BE BETTER OFF DEAD, OR OF HURTING YOURSELF: NOT AT ALL
10. IF YOU CHECKED OFF ANY PROBLEMS, HOW DIFFICULT HAVE THESE PROBLEMS MADE IT FOR YOU TO DO YOUR WORK, TAKE CARE OF THINGS AT HOME, OR GET ALONG WITH OTHER PEOPLE: NOT DIFFICULT AT ALL
3. TROUBLE FALLING OR STAYING ASLEEP: NOT AT ALL
SUM OF ALL RESPONSES TO PHQ QUESTIONS 1-9: 0
SUM OF ALL RESPONSES TO PHQ QUESTIONS 1-9: 0
4. FEELING TIRED OR HAVING LITTLE ENERGY: NOT AT ALL
SUM OF ALL RESPONSES TO PHQ QUESTIONS 1-9: 0
8. MOVING OR SPEAKING SO SLOWLY THAT OTHER PEOPLE COULD HAVE NOTICED. OR THE OPPOSITE, BEING SO FIGETY OR RESTLESS THAT YOU HAVE BEEN MOVING AROUND A LOT MORE THAN USUAL: NOT AT ALL
SUM OF ALL RESPONSES TO PHQ QUESTIONS 1-9: 0
6. FEELING BAD ABOUT YOURSELF - OR THAT YOU ARE A FAILURE OR HAVE LET YOURSELF OR YOUR FAMILY DOWN: NOT AT ALL
7. TROUBLE CONCENTRATING ON THINGS, SUCH AS READING THE NEWSPAPER OR WATCHING TELEVISION: NOT AT ALL
5. POOR APPETITE OR OVEREATING: NOT AT ALL
2. FEELING DOWN, DEPRESSED OR HOPELESS: NOT AT ALL

## 2025-04-15 NOTE — PROGRESS NOTES
HISTORY OF PRESENT ILLNESS  Rosalio Bolivar is a 59 y.o. female     SUMMARY:   Patient Active Problem List   Diagnosis    VAL on CPAP    Obesity (BMI 35.0-39.9 without comorbidity)    Smoker    Obesity, morbid    Tachypnea    Granulation tissue of vaginal cuff    H/O:     S/P ventral herniorrhaphy    Class 3 severe obesity with body mass index (BMI) of 40.0 to 44.9 in adult    Pneumonia due to COVID-19 virus    Recurrent major depression    H/O tubal ligation    Thyroid nodule    Hypokalemia    Difficulty swallowing    Posttraumatic stress disorder    Essential hypertension            CARDIOLOGY STUDIES TO DATE:   mild tr, otherwise normal echo   negative lexiscan   event monitor, sinus tachycardia, rare pac, pvc     [facility] abnormal lexiscan  Small size, mild to moderate grade anteroseptal defect this defect is partially fixed and partially reversible and may represent an area of myocardium at ischemic risk. Left ventricular ejection fraction is normal at 58%.  Normal wall motion and thickening    Chief Complaint   Patient presents with    Hypertension       HPI :  Since we last met she has been keeping regular follow-up with her primary care at [facility], and she recently complained of worsening dizzy spells which sound like vertigo.  She has also noticed increased shortness of breath with activity and because of all that a stress test was ordered and I have reviewed and summarized above.  It was abnormal and suggested ischemia in the LAD territory though it may be a false positive given her body shape.  She faithfully wears her CPAP.  She is lost about 2 pounds since we last met.  She says her last A1c was 9.3 which is an improvement for her.  No recent palpitations.    CARDIAC ROS:   negative for chest pain, claudication, orthopnea, paroxysmal nocturnal dyspnea, and syncope    Family History   Problem Relation Age of Onset    Asthma Sister     Heart Disease Father 48        M.I.

## 2025-04-15 NOTE — PROGRESS NOTES
1. Have you been to the ER, urgent care clinic since your last visit?  Hospitalized since your last visit?No    2. Have you seen or consulted any other health care providers outside of the [facility] since your last visit?  Include any pap smears or colon screening. No

## 2025-04-24 ENCOUNTER — HOSPITAL ENCOUNTER (OUTPATIENT)
Facility: HOSPITAL | Age: 60
Discharge: HOME | DRG: 287 | End: 2025-04-24
Attending: INTERNAL MEDICINE | Admitting: INTERNAL MEDICINE
Payer: MEDICARE

## 2025-04-24 VITALS
BODY MASS INDEX: 47.91 KG/M2 | OXYGEN SATURATION: 98 % | HEIGHT: 60 IN | RESPIRATION RATE: 21 BRPM | DIASTOLIC BLOOD PRESSURE: 80 MMHG | TEMPERATURE: 97.7 F | WEIGHT: 244 LBS | HEART RATE: 90 BPM | SYSTOLIC BLOOD PRESSURE: 168 MMHG

## 2025-04-24 DIAGNOSIS — Z79.4 TYPE 2 DIABETES MELLITUS TREATED WITH INSULIN (HCC): ICD-10-CM

## 2025-04-24 DIAGNOSIS — E11.9 TYPE 2 DIABETES MELLITUS TREATED WITH INSULIN (HCC): ICD-10-CM

## 2025-04-24 DIAGNOSIS — R06.02 SHORTNESS OF BREATH: ICD-10-CM

## 2025-04-24 DIAGNOSIS — E78.2 MIXED HYPERLIPIDEMIA: ICD-10-CM

## 2025-04-24 DIAGNOSIS — R94.39 ABNORMAL STRESS TEST: ICD-10-CM

## 2025-04-24 DIAGNOSIS — I10 ESSENTIAL HYPERTENSION: ICD-10-CM

## 2025-04-24 PROBLEM — I25.9 MYOCARDIAL ISCHEMIA: Status: ACTIVE | Noted: 2025-04-24

## 2025-04-24 PROBLEM — I20.0 UNSTABLE ANGINA PECTORIS (HCC): Status: ACTIVE | Noted: 2025-04-24

## 2025-04-24 LAB
ANION GAP SERPL CALC-SCNC: 8 MMOL/L (ref 2–12)
BUN SERPL-MCNC: 13 MG/DL (ref 6–20)
BUN/CREAT SERPL: 12 (ref 12–20)
CALCIUM SERPL-MCNC: 9.6 MG/DL (ref 8.5–10.1)
CHLORIDE SERPL-SCNC: 107 MMOL/L (ref 97–108)
CO2 SERPL-SCNC: 25 MMOL/L (ref 21–32)
CREAT SERPL-MCNC: 1.06 MG/DL (ref 0.55–1.02)
ECHO BSA: 2.16 M2
ERYTHROCYTE [DISTWIDTH] IN BLOOD BY AUTOMATED COUNT: 14.8 % (ref 11.5–14.5)
GLUCOSE BLD STRIP.AUTO-MCNC: 210 MG/DL (ref 65–117)
GLUCOSE SERPL-MCNC: 198 MG/DL (ref 65–100)
HCT VFR BLD AUTO: 37.4 % (ref 35–47)
HGB BLD-MCNC: 11.9 G/DL (ref 11.5–16)
MCH RBC QN AUTO: 27 PG (ref 26–34)
MCHC RBC AUTO-ENTMCNC: 31.8 G/DL (ref 30–36.5)
MCV RBC AUTO: 84.8 FL (ref 80–99)
NRBC # BLD: 0 K/UL (ref 0–0.01)
NRBC BLD-RTO: 0 PER 100 WBC
PLATELET # BLD AUTO: 162 K/UL (ref 150–400)
PMV BLD AUTO: 10.9 FL (ref 8.9–12.9)
POTASSIUM SERPL-SCNC: 3.6 MMOL/L (ref 3.5–5.1)
RBC # BLD AUTO: 4.41 M/UL (ref 3.8–5.2)
SERVICE CMNT-IMP: ABNORMAL
SODIUM SERPL-SCNC: 140 MMOL/L (ref 136–145)
WBC # BLD AUTO: 4.5 K/UL (ref 3.6–11)

## 2025-04-24 PROCEDURE — B241ZZ3 ULTRASONOGRAPHY OF MULTIPLE CORONARY ARTERIES, INTRAVASCULAR: ICD-10-PCS | Performed by: INTERNAL MEDICINE

## 2025-04-24 PROCEDURE — 80048 BASIC METABOLIC PNL TOTAL CA: CPT

## 2025-04-24 PROCEDURE — 93458 L HRT ARTERY/VENTRICLE ANGIO: CPT | Performed by: INTERNAL MEDICINE

## 2025-04-24 PROCEDURE — 2709999900 HC NON-CHARGEABLE SUPPLY: Performed by: INTERNAL MEDICINE

## 2025-04-24 PROCEDURE — C1894 INTRO/SHEATH, NON-LASER: HCPCS | Performed by: INTERNAL MEDICINE

## 2025-04-24 PROCEDURE — 82962 GLUCOSE BLOOD TEST: CPT

## 2025-04-24 PROCEDURE — B2111ZZ FLUOROSCOPY OF MULTIPLE CORONARY ARTERIES USING LOW OSMOLAR CONTRAST: ICD-10-PCS | Performed by: INTERNAL MEDICINE

## 2025-04-24 PROCEDURE — C1713 ANCHOR/SCREW BN/BN,TIS/BN: HCPCS | Performed by: INTERNAL MEDICINE

## 2025-04-24 PROCEDURE — 4A023N7 MEASUREMENT OF CARDIAC SAMPLING AND PRESSURE, LEFT HEART, PERCUTANEOUS APPROACH: ICD-10-PCS | Performed by: INTERNAL MEDICINE

## 2025-04-24 PROCEDURE — 99152 MOD SED SAME PHYS/QHP 5/>YRS: CPT | Performed by: INTERNAL MEDICINE

## 2025-04-24 PROCEDURE — 99153 MOD SED SAME PHYS/QHP EA: CPT | Performed by: INTERNAL MEDICINE

## 2025-04-24 PROCEDURE — 85027 COMPLETE CBC AUTOMATED: CPT

## 2025-04-24 PROCEDURE — 6360000002 HC RX W HCPCS: Performed by: INTERNAL MEDICINE

## 2025-04-24 PROCEDURE — 2500000003 HC RX 250 WO HCPCS: Performed by: INTERNAL MEDICINE

## 2025-04-24 PROCEDURE — 76937 US GUIDE VASCULAR ACCESS: CPT | Performed by: INTERNAL MEDICINE

## 2025-04-24 PROCEDURE — 6360000004 HC RX CONTRAST MEDICATION: Performed by: INTERNAL MEDICINE

## 2025-04-24 RX ORDER — IOPAMIDOL 755 MG/ML
INJECTION, SOLUTION INTRAVASCULAR PRN
Status: DISCONTINUED | OUTPATIENT
Start: 2025-04-24 | End: 2025-04-24 | Stop reason: HOSPADM

## 2025-04-24 RX ORDER — SODIUM CHLORIDE 0.9 % (FLUSH) 0.9 %
5-40 SYRINGE (ML) INJECTION PRN
Status: DISCONTINUED | OUTPATIENT
Start: 2025-04-24 | End: 2025-04-24 | Stop reason: HOSPADM

## 2025-04-24 RX ORDER — VERAPAMIL HYDROCHLORIDE 2.5 MG/ML
INJECTION, SOLUTION INTRAVENOUS PRN
Status: DISCONTINUED | OUTPATIENT
Start: 2025-04-24 | End: 2025-04-24 | Stop reason: HOSPADM

## 2025-04-24 RX ORDER — SODIUM CHLORIDE 9 MG/ML
INJECTION, SOLUTION INTRAVENOUS PRN
Status: DISCONTINUED | OUTPATIENT
Start: 2025-04-24 | End: 2025-04-24 | Stop reason: HOSPADM

## 2025-04-24 RX ORDER — SULFASALAZINE 500 MG/1
1000 TABLET ORAL 2 TIMES DAILY
COMMUNITY

## 2025-04-24 RX ORDER — HEPARIN SODIUM 1000 [USP'U]/ML
INJECTION, SOLUTION INTRAVENOUS; SUBCUTANEOUS PRN
Status: DISCONTINUED | OUTPATIENT
Start: 2025-04-24 | End: 2025-04-24 | Stop reason: HOSPADM

## 2025-04-24 RX ORDER — LIDOCAINE HYDROCHLORIDE 10 MG/ML
INJECTION, SOLUTION INFILTRATION; PERINEURAL PRN
Status: DISCONTINUED | OUTPATIENT
Start: 2025-04-24 | End: 2025-04-24 | Stop reason: HOSPADM

## 2025-04-24 RX ORDER — SODIUM CHLORIDE 0.9 % (FLUSH) 0.9 %
5-40 SYRINGE (ML) INJECTION EVERY 12 HOURS SCHEDULED
Status: DISCONTINUED | OUTPATIENT
Start: 2025-04-24 | End: 2025-04-24 | Stop reason: HOSPADM

## 2025-04-24 RX ORDER — FENTANYL CITRATE 50 UG/ML
INJECTION, SOLUTION INTRAMUSCULAR; INTRAVENOUS PRN
Status: DISCONTINUED | OUTPATIENT
Start: 2025-04-24 | End: 2025-04-24 | Stop reason: HOSPADM

## 2025-04-24 RX ORDER — ACETAMINOPHEN 325 MG/1
650 TABLET ORAL EVERY 4 HOURS PRN
Status: DISCONTINUED | OUTPATIENT
Start: 2025-04-24 | End: 2025-04-24 | Stop reason: HOSPADM

## 2025-04-24 RX ORDER — MIDAZOLAM HYDROCHLORIDE 1 MG/ML
INJECTION, SOLUTION INTRAMUSCULAR; INTRAVENOUS PRN
Status: DISCONTINUED | OUTPATIENT
Start: 2025-04-24 | End: 2025-04-24 | Stop reason: HOSPADM

## 2025-04-24 NOTE — PROGRESS NOTES
Ambulated patient to the bathroom with a steady gait, voided without difficulty. Patient denies chest pain, shortness of breath, or dizziness. [facility] Vital signs stable.     Procedural site is clean, dry, and intact. No bleeding, no hematoma.     Assisted patient in dressing/Patient dressed self.     Educated patient about their sedation precautions such as not driving, operating any machinery, or signing legal documents 24 hours post procedure.     Reviewed discharge instructions, including medications and site care using the teach back method.  Answered all questions. Verbalized understanding.     Removed peripheral IV.    Escorted to discharge area in a wheelchair with all of their belongings including Cell phone and all belongings.    Patient's daughter / Dennise present to take patient home. Reviewed discharge instructions with patients' Daughter, they verbalized understanding.

## 2025-04-24 NOTE — PROGRESS NOTES
CATH LAB to RECOVERY ROOM REPORT    Procedure: [facility]    MD: LIA Nolasco MD    The procedure was diagnostic only.    Verbal Report given to Recovery Nurse on patient being transferred to Cardiac Cath Lab  for routine post-op. Patient stable upon transfer to .  Vitals, mental status, MAR, procedural summary discussed with recovery RN.    Medication given during procedure:    Contrast:23 mL                          Heparin:2500 units     Versed:0.5 mg                                                               Fentanyl:25 mcg                                                                   Sheaths:    Right radial sheath pulled at 0915 am, band at 11 mL of air

## 2025-04-24 NOTE — PROGRESS NOTES
Cardiac Cath Lab Recovery Arrival Note:      Rosalio Bolivar arrived to Cardiac Cath Lab, Recovery Area. Staff introduced to patient. Patient identifiers verified with NAME and DATE OF BIRTH. Procedure verified with patient. Consent forms reviewed and signed by patient or authorized representative and verified. Allergies verified.     Patient and family oriented to department. Patient and family informed of procedure and plan of care.     Questions answered with review. Patient prepped for procedure, per orders from physician, prior to arrival.    Patient on cardiac monitor, non-invasive blood pressure, SPO2 monitor. On Room Air. Patient is A&Ox 4. Patient reports No Pain.     Patient in stretcher, in low position, with side rails up, call bell within reach, patient instructed to call if assistance as needed.    Patient prep in: [facility] Recovery Area, [place] FT 4.   Patient family : Nroa/ daughter [phone]  Family in: Waiting Room .   Prep by: Emily Marcial RN

## 2025-04-24 NOTE — PROGRESS NOTES
Cardiac Cath Lab Procedure Area Arrival Note:    Rosalio Bolivar arrived to Cardiac Cath Lab, Procedure Area. Patient identifiers verified with NAME and DATE OF BIRTH. Procedure verified with patient. Consent forms verified. Allergies verified. Patient informed of procedure and plan of care. Questions answered with review. Patient voiced understanding of procedure and plan of care.    Patient on cardiac monitor, non-invasive blood pressure, SPO2 monitor. On  O2 @ 2 lpm via NC.  IV of NS on pump at 25 ml/hr. Patient status doing well without problems. Patient is A&Ox 4. Patient reports no complaints of pain or concerns.     Patient medicated during procedure with orders obtained and verified by Dr. Gould.    Refer to patients Cardiac Cath Lab PROCEDURE REPORT for vital signs, assessment, status, and response during procedure, printed at end of case. Printed report on chart or scanned into chart.

## 2025-04-24 NOTE — PROGRESS NOTES
TRANSFER - IN REPORT:    Verbal report received from [employer] on Rosalio Bolivar  being received from left cardiac catheterization for routine progression of patient care. Report consisted of patient’s Situation, Background, Assessment and Recommendations(SBAR). Information from the following report(s) Procedure Summary was reviewed with the receiving clinician. Opportunity for questions and clarification was provided. Assessment completed upon patient’s arrival to Cardiac Cath Lab RECOVERY AREA and care assumed.       Cardiac Cath Lab Recovery Arrival Note:    Rosalio Bolivar arrived to [facility] recovery area.  Patient procedure= left cardiac catheterization. Patient on cardiac monitor, non-invasive blood pressure, SPO2 monitor. On room air.  IV  of Normal saline on pump at 125 ml/hr. Patient status doing well without problems. Patient is A&Ox 4. Patient reports no pain.    PROCEDURE SITE CHECK:    Procedure site:without any bleeding and no hematoma, no pain/discomfort reported at procedure site.     No change in patient status. Continue to monitor patient and status.    1135 Patient walked and is steady on her feet with  no complaints of dizziness. Patient back to Recovery bay 4 and dressed with minimal assistance. Patient ready for discharge home.

## 2025-05-07 ENCOUNTER — TRANSCRIBE ORDERS (OUTPATIENT)
Facility: HOSPITAL | Age: 60
End: 2025-05-07

## 2025-05-07 DIAGNOSIS — Z87.891 PERSONAL HISTORY OF TOBACCO USE: Primary | ICD-10-CM

## 2025-05-07 DIAGNOSIS — F17.211 CIGARETTE NICOTINE DEPENDENCE IN REMISSION: ICD-10-CM

## 2025-07-26 ENCOUNTER — HOSPITAL ENCOUNTER (EMERGENCY)
Facility: HOSPITAL | Age: 60
Discharge: HOME | End: 2025-07-26
Attending: EMERGENCY MEDICINE
Payer: MEDICARE

## 2025-07-26 VITALS
DIASTOLIC BLOOD PRESSURE: 82 MMHG | BODY MASS INDEX: 48.84 KG/M2 | OXYGEN SATURATION: 100 % | RESPIRATION RATE: 20 BRPM | TEMPERATURE: 98.2 F | HEIGHT: 60 IN | SYSTOLIC BLOOD PRESSURE: 170 MMHG | HEART RATE: 100 BPM | WEIGHT: 248.8 LBS

## 2025-07-26 DIAGNOSIS — L81.9 BLEEDING PIGMENTED SKIN LESION: Primary | ICD-10-CM

## 2025-07-26 PROCEDURE — 99283 EMERGENCY DEPT VISIT LOW MDM: CPT

## 2025-07-26 ASSESSMENT — LIFESTYLE VARIABLES
HOW OFTEN DO YOU HAVE A DRINK CONTAINING ALCOHOL: NEVER
HOW MANY STANDARD DRINKS CONTAINING ALCOHOL DO YOU HAVE ON A TYPICAL DAY: PATIENT DOES NOT DRINK

## 2025-07-26 NOTE — ED TRIAGE NOTES
Pt arrives from home via EMS with cc of bleeding from cyst or mole on left arm. She scratched it around 1900 last night and it just kept bleeding. Bleeding is controlled with coban applied by EMS. Denies blood thinner use or other trauma.

## 2025-07-26 NOTE — ED NOTES
Discharge instructions were given to the patient by Daniela PISANO. The patient left the Emergency Department ambulatory, alert and oriented and in no acute distress with 0 prescriptions. The patient was encouraged to call or return to the ED for worsening issues or problems and was encouraged to schedule a follow up appointment for continuing care. The patient verbalized understanding of discharge instructions and prescriptions, all questions were answered. The patient has no further concerns at this time.

## 2025-07-26 NOTE — ED NOTES
Verbal shift change report given to Daniela PISANO (oncoming nurse) by ALIYA Plascencia (offgoing nurse). Report included the following information Nurse Handoff Report, ED SBAR, MAR, and Recent Results.

## 2025-07-26 NOTE — ED PROVIDER NOTES
[facility] EMERGENCY DEPARTMENT  EMERGENCY DEPARTMENT ENCOUNTER       Pt Name: oRsalio Bolivar  MRN: [id number]  Birthdate 1965  Date of evaluation: 7/26/2025  Provider: Coretta Wheeler MD   PCP: Tara Cherry MD  Note Started: 7:02 PM 7/26/25     (Please note that parts of this dictation were completed with voice recognition software. Quite often unanticipated grammatical, syntax, homophones, and other interpretive errors are inadvertently transcribed by the computer software. Please disregards these errors. Please excuse any errors that have escaped final proofreading.)    CHIEF COMPLAINT       Chief Complaint   Patient presents with    Bleeding/Bruising        HISTORY OF PRESENT ILLNESS: 1 or more elements      History From: patient, History limited by:  none     Rosalio Bolivar is a 59 y.o. female who presents via ems with bleeding from a soft tissue skin lesion on her left upper arm.   She scratched it around 7 PM and has been bleeding since.  She called EMS who applied a dressing which is now only minimally wet with blood.  Denies blood thinner use, dizziness, weakness, or other systemic symptoms     Nursing Notes were all reviewed and agreed with or any disagreements were addressed in the HPI.     REVIEW OF SYSTEMS        Positives and Pertinent negatives as per HPI.    PAST HISTORY     Past Medical History:  Past Medical History:   Diagnosis Date    Chronic pelvic pain in female 7/9/2014    Depression     Diabetes (HCC)     Hypertension     Obesity (BMI 35.0-39.9 without comorbidity)     VAL on CPAP 3/9/2017    Posttraumatic stress disorder     Psychiatric disorder     depression    Recurrent major depression 7/27/2010    S/P ventral herniorrhaphy 2/1/2018    Suicidal thoughts     Thyroid nodule 10/1/2015    Unspecified sleep apnea     uses cpap       Past Surgical History:  Past Surgical History:   Procedure Laterality Date    CARDIAC PROCEDURE N/A 4/24/2025    Left heart cath /

## 2025-07-26 NOTE — ED NOTES
This nurse went to re-assess patient. Pt is alert and oriented x 4, RR even and unlabored, skin is warm and dry. Assessment completed and pt updated on plan of care.  Call bell in reach.        Emergency Department Nursing Plan of Care       The Nursing Plan of Care is developed from the Nursing assessment and Emergency Department Attending provider initial evaluation.  The plan of care may be reviewed in the “ED Provider note”.    The Plan of Care was developed with the following considerations:   Patient / Family readiness to learn indicated by:verbalized understanding  Persons(s) to be included in education: patient  Barriers to Learning/Limitations:None    Signed

## (undated) DEVICE — SURGICAL PROCEDURE PACK BASIN MAJ SET CUST NO CAUT

## (undated) DEVICE — Z INACTIVE NO USAGE TURNOVER KIT RM CLEANOP

## (undated) DEVICE — TR BAND RADIAL ARTERY COMPRESSION DEVICE: Brand: TR BAND

## (undated) DEVICE — 1200 GUARD II KIT W/5MM TUBE W/O VAC TUBE: Brand: GUARDIAN

## (undated) DEVICE — Device

## (undated) DEVICE — DBD-PACK,LAPAROTOMY,2 REINFORCED GOWNS: Brand: MEDLINE

## (undated) DEVICE — PROVE COVER: Brand: UNBRANDED

## (undated) DEVICE — CATHETER DIAG 5FR L110CM PIG CRV SZ 6 SIDE H DBL BRAID WIRE

## (undated) DEVICE — SOLUTION IV 1000ML 0.9% SOD CHL

## (undated) DEVICE — GAUZE SPONGES,12 PLY: Brand: CURITY

## (undated) DEVICE — (D)PREP SKN CHLRAPRP APPL 26ML -- CONVERT TO ITEM 371833

## (undated) DEVICE — WASTE KIT - ST MARY: Brand: MEDLINE INDUSTRIES, INC.

## (undated) DEVICE — SUTURE MCRYL SZ 4-0 L27IN ABSRB UD L19MM PS-2 1/2 CIR PRIM Y426H

## (undated) DEVICE — SPECIAL PROCEDURE DRAPE 32" X 34": Brand: SPECIAL PROCEDURE DRAPE

## (undated) DEVICE — LIGHT HANDLE: Brand: DEVON

## (undated) DEVICE — FCPS RAD JAW 4LC 240CM W/NDL -- BX/40

## (undated) DEVICE — 3M™ TEGADERM™ TRANSPARENT FILM DRESSING FRAME STYLE, 1626W, 4 IN X 4-3/4 IN (10 CM X 12 CM), 50/CT 4CT/CASE: Brand: 3M™ TEGADERM™

## (undated) DEVICE — KIT MFLD ISOLATN NACL CNTRST PRT TBNG SPIK W/ PRSS TRNSDUC

## (undated) DEVICE — GLIDESHEATH SLENDER ACCESS KIT: Brand: GLIDESHEATH SLENDER

## (undated) DEVICE — KENDALL SCD EXPRESS SLEEVES, KNEE LENGTH, MEDIUM: Brand: KENDALL SCD

## (undated) DEVICE — ANGIOGRAPHY KIT

## (undated) DEVICE — CATHETER DIAG 5FR L100CM LUMN ID0.047IN JR4 CRV 0 SIDE H

## (undated) DEVICE — SUTURE VCRL SZ 3-0 L27IN ABSRB UD L26MM SH 1/2 CIR J416H

## (undated) DEVICE — CATHETER DIAG 5FR L100CM LUMN ID0.047IN JL3.5 CRV 0 SIDE H

## (undated) DEVICE — ASTOUND STANDARD SURGICAL GOWN, XL: Brand: CONVERTORS

## (undated) DEVICE — SPLINT WR VELC FOAM NEUT POS DISP FOR RAD ART ACC SFT STRP

## (undated) DEVICE — MEDI-VAC YANK SUCT HNDL W/TPRD BULBOUS TIP: Brand: CARDINAL HEALTH

## (undated) DEVICE — SUTURE VCRL SZ 1 L36IN ABSRB UD L36MM CT-1 1/2 CIR J947H

## (undated) DEVICE — SUTURE VCRL SZ 2-0 L27IN ABSRB UD L26MM SH 1/2 CIR J417H

## (undated) DEVICE — REM POLYHESIVE ADULT PATIENT RETURN ELECTRODE: Brand: VALLEYLAB

## (undated) DEVICE — KIT AT-X65 ANGIOTOUCH HAND CONTROLLER

## (undated) DEVICE — SYR 10ML LUER LOK 1/5ML GRAD --

## (undated) DEVICE — TOWEL SURG W17XL27IN STD BLU COT NONFENESTRATED PREWASHED

## (undated) DEVICE — FORCEPS BX L240CM JAW DIA2.8MM L CAP W/ NDL MIC MESH TOOTH

## (undated) DEVICE — KIT MED IMAG CNTRST AGNT W/ IOPAMIDOL REUSE

## (undated) DEVICE — ROCKER SWITCH PENCIL BLADE ELECTRODE, HOLSTER: Brand: EDGE

## (undated) DEVICE — TUBING HYDR IRR --

## (undated) DEVICE — NEEDLE HYPO 22GA L1.5IN BLK S STL HUB POLYPR SHLD REG BVL

## (undated) DEVICE — STERILE POLYISOPRENE POWDER-FREE SURGICAL GLOVES: Brand: PROTEXIS

## (undated) DEVICE — SUTURE VCRL SZ 3-0 L54IN ABSRB VLT LIGAPAK REEL NDL J205G

## (undated) DEVICE — DERMABOND SKIN ADH 0.7ML -- DERMABOND ADVANCED 12/BX